# Patient Record
Sex: FEMALE | Race: WHITE | NOT HISPANIC OR LATINO | Employment: OTHER | URBAN - METROPOLITAN AREA
[De-identification: names, ages, dates, MRNs, and addresses within clinical notes are randomized per-mention and may not be internally consistent; named-entity substitution may affect disease eponyms.]

---

## 2022-11-12 ENCOUNTER — PATIENT MESSAGE (OUTPATIENT)
Dept: ORTHOPEDICS | Facility: CLINIC | Age: 62
End: 2022-11-12
Payer: OTHER GOVERNMENT

## 2022-11-17 ENCOUNTER — HOSPITAL ENCOUNTER (OUTPATIENT)
Dept: RADIOLOGY | Facility: HOSPITAL | Age: 62
Discharge: HOME | End: 2022-11-17
Attending: ORTHOPAEDIC SURGERY
Payer: OTHER GOVERNMENT

## 2022-11-17 ENCOUNTER — OFFICE VISIT (OUTPATIENT)
Dept: ORTHOPEDICS | Facility: CLINIC | Age: 62
End: 2022-11-17
Payer: OTHER GOVERNMENT

## 2022-11-17 VITALS — WEIGHT: 136.44 LBS

## 2022-11-17 DIAGNOSIS — M47.812 CERVICAL SPONDYLOSIS: ICD-10-CM

## 2022-11-17 DIAGNOSIS — Z98.1 S/P CERVICAL SPINAL FUSION: Primary | ICD-10-CM

## 2022-11-17 DIAGNOSIS — Z98.1 S/P CERVICAL SPINAL FUSION: ICD-10-CM

## 2022-11-17 DIAGNOSIS — M50.30 DDD (DEGENERATIVE DISC DISEASE), CERVICAL: ICD-10-CM

## 2022-11-17 PROCEDURE — 72125 CT NECK SPINE W/O DYE: CPT | Mod: 26,,, | Performed by: RADIOLOGY

## 2022-11-17 PROCEDURE — 72125 CT CERVICAL SPINE WITHOUT CONTRAST: ICD-10-PCS | Mod: 26,,, | Performed by: RADIOLOGY

## 2022-11-17 PROCEDURE — 99204 OFFICE O/P NEW MOD 45 MIN: CPT | Mod: S$PBB,,, | Performed by: ORTHOPAEDIC SURGERY

## 2022-11-17 PROCEDURE — 72125 CT NECK SPINE W/O DYE: CPT | Mod: TC

## 2022-11-17 PROCEDURE — 99999 PR PBB SHADOW E&M-EST. PATIENT-LVL III: CPT | Mod: PBBFAC,,, | Performed by: ORTHOPAEDIC SURGERY

## 2022-11-17 PROCEDURE — 72050 X-RAY EXAM NECK SPINE 4/5VWS: CPT | Mod: 26,,, | Performed by: RADIOLOGY

## 2022-11-17 PROCEDURE — 72050 XR CERVICAL SPINE AP LAT WITH FLEX EXTEN: ICD-10-PCS | Mod: 26,,, | Performed by: RADIOLOGY

## 2022-11-17 PROCEDURE — 99213 OFFICE O/P EST LOW 20 MIN: CPT | Mod: PBBFAC,25 | Performed by: ORTHOPAEDIC SURGERY

## 2022-11-17 PROCEDURE — 72050 X-RAY EXAM NECK SPINE 4/5VWS: CPT | Mod: TC

## 2022-11-17 PROCEDURE — 99204 PR OFFICE/OUTPT VISIT, NEW, LEVL IV, 45-59 MIN: ICD-10-PCS | Mod: S$PBB,,, | Performed by: ORTHOPAEDIC SURGERY

## 2022-11-17 PROCEDURE — 99999 PR PBB SHADOW E&M-EST. PATIENT-LVL III: ICD-10-PCS | Mod: PBBFAC,,, | Performed by: ORTHOPAEDIC SURGERY

## 2022-11-17 RX ORDER — ATORVASTATIN CALCIUM 20 MG/1
20 TABLET, FILM COATED ORAL DAILY
COMMUNITY

## 2022-11-17 RX ORDER — LORAZEPAM 1 MG/1
1 TABLET ORAL 3 TIMES DAILY
COMMUNITY

## 2022-11-17 RX ORDER — FLUTICASONE PROPIONATE 50 MCG
2 SPRAY, SUSPENSION (ML) NASAL DAILY
COMMUNITY

## 2022-11-17 RX ORDER — DULOXETIN HYDROCHLORIDE 20 MG/1
60 CAPSULE, DELAYED RELEASE ORAL 2 TIMES DAILY
COMMUNITY

## 2022-11-17 RX ORDER — HYDROGEN PEROXIDE 3 %
20 SOLUTION, NON-ORAL MISCELLANEOUS DAILY
COMMUNITY

## 2022-11-17 RX ORDER — METFORMIN HYDROCHLORIDE 1000 MG/1
1000 TABLET ORAL 2 TIMES DAILY WITH MEALS
COMMUNITY

## 2022-11-17 RX ORDER — OXCARBAZEPINE 150 MG/1
150 TABLET, FILM COATED ORAL 2 TIMES DAILY
COMMUNITY

## 2022-11-17 RX ORDER — TRAZODONE HYDROCHLORIDE 100 MG/1
100 TABLET ORAL NIGHTLY
COMMUNITY

## 2022-11-17 RX ORDER — TIZANIDINE 4 MG/1
4 TABLET ORAL 2 TIMES DAILY
Status: ON HOLD | COMMUNITY
End: 2023-02-10 | Stop reason: HOSPADM

## 2022-11-17 RX ORDER — FEXOFENADINE HCL AND PSEUDOEPHEDRINE HCI 60; 120 MG/1; MG/1
1 TABLET, EXTENDED RELEASE ORAL NIGHTLY
COMMUNITY
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
## 2022-11-23 ENCOUNTER — TELEPHONE (OUTPATIENT)
Dept: ORTHOPEDICS | Facility: CLINIC | Age: 62
End: 2022-11-23
Payer: OTHER GOVERNMENT

## 2022-11-23 NOTE — TELEPHONE ENCOUNTER
----- Message from Sharifa Delacruz PA-C sent at 11/21/2022  2:45 PM CST -----  She is booked for surgery with dr horn on 1/10/2023, can you ander her and schedule a pre op appt when you get a second

## 2022-11-29 ENCOUNTER — PATIENT MESSAGE (OUTPATIENT)
Dept: ORTHOPEDICS | Facility: CLINIC | Age: 62
End: 2022-11-29
Payer: OTHER GOVERNMENT

## 2022-11-30 ENCOUNTER — TELEPHONE (OUTPATIENT)
Dept: ORTHOPEDICS | Facility: CLINIC | Age: 62
End: 2022-11-30
Payer: OTHER GOVERNMENT

## 2022-11-30 NOTE — TELEPHONE ENCOUNTER
----- Message from Shanice Farnsworth sent at 11/30/2022 12:13 PM CST -----  Contact: pt  Pt returning call to office     Confirmed patient's contact info below:  Contact Name: Ana Lilia Matos  Phone Number: [phone]

## 2022-11-30 NOTE — TELEPHONE ENCOUNTER
Rescheduled appointment per HAILEY Delacruz for pre-op due to surgery date. Left message for patient informing her of this matter.

## 2023-01-03 ENCOUNTER — PATIENT MESSAGE (OUTPATIENT)
Dept: ADMINISTRATIVE | Facility: OTHER | Age: 63
End: 2023-01-03
Payer: OTHER GOVERNMENT

## 2023-01-09 ENCOUNTER — PATIENT MESSAGE (OUTPATIENT)
Dept: ORTHOPEDICS | Facility: CLINIC | Age: 63
End: 2023-01-09
Payer: OTHER GOVERNMENT

## 2023-01-18 ENCOUNTER — TELEPHONE (OUTPATIENT)
Dept: PREADMISSION TESTING | Facility: HOSPITAL | Age: 63
End: 2023-01-18
Payer: OTHER GOVERNMENT

## 2023-01-18 DIAGNOSIS — Z01.818 PREOPERATIVE TESTING: Primary | ICD-10-CM
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
## 2023-01-18 NOTE — PRE-PROCEDURE INSTRUCTIONS
Patient stated gets LALITO with every surgery. Will need medical clearance from your PCP, Dr. Sunday Lovett in [place], [place]. She will make an appt. Will need poc appt, labs, ua, and ekg.  Our  will call to set up these appts.    Preop instructions given. Hold aspirin, aspirin containing products, nsaids(aleve, advil, motrin, ibuprofen, naprosyn, naproxen, voltaren, diclofenac), vitamins and supplements one week prior to surgery.       ( Sent to [facility] portal)  Verbalizes understanding.

## 2023-01-26 ENCOUNTER — OFFICE VISIT (OUTPATIENT)
Dept: ORTHOPEDICS | Facility: CLINIC | Age: 63
End: 2023-01-26
Payer: OTHER GOVERNMENT

## 2023-01-26 VITALS — WEIGHT: 135.38 LBS | BODY MASS INDEX: 22.56 KG/M2 | HEIGHT: 65 IN

## 2023-01-26 DIAGNOSIS — M50.30 DDD (DEGENERATIVE DISC DISEASE), CERVICAL: ICD-10-CM

## 2023-01-26 DIAGNOSIS — M54.12 CERVICAL RADICULOPATHY: ICD-10-CM

## 2023-01-26 DIAGNOSIS — M47.812 CERVICAL SPONDYLOSIS: Primary | ICD-10-CM

## 2023-01-26 DIAGNOSIS — S12.9XXA PSEUDOARTHROSIS OF CERVICAL SPINE, INITIAL ENCOUNTER: ICD-10-CM

## 2023-01-26 PROCEDURE — 99214 PR OFFICE/OUTPT VISIT, EST, LEVL IV, 30-39 MIN: ICD-10-PCS | Mod: S$PBB,,, | Performed by: ORTHOPAEDIC SURGERY

## 2023-01-26 PROCEDURE — 99999 PR PBB SHADOW E&M-EST. PATIENT-LVL III: CPT | Mod: PBBFAC,,, | Performed by: ORTHOPAEDIC SURGERY

## 2023-01-26 PROCEDURE — 99214 OFFICE O/P EST MOD 30 MIN: CPT | Mod: S$PBB,,, | Performed by: ORTHOPAEDIC SURGERY

## 2023-01-26 PROCEDURE — 99213 OFFICE O/P EST LOW 20 MIN: CPT | Mod: PBBFAC | Performed by: ORTHOPAEDIC SURGERY

## 2023-01-26 PROCEDURE — 99999 PR PBB SHADOW E&M-EST. PATIENT-LVL III: ICD-10-PCS | Mod: PBBFAC,,, | Performed by: ORTHOPAEDIC SURGERY

## 2023-01-26 NOTE — PROGRESS NOTES
DATE: 1/26/2023  PATIENT: Ana Lilia Matos    Attending Physician: Marlon Cruz M.D.    CHIEF COMPLAINT: neck and BUE pain    HISTORY:  Ana Lilia Matos is a 62 y.o. female w/ a hx of C4-7 ACDF 13 years ago presents for initial evaluation of neck and bilateral arm pain (Neck - 7, Arm - 7). The pain has been present for 5 years. The patient describes the pain as dull and it radiates down BUE to fingers. The pain is worse with activity and improved by rest. There is BUE associated numbness and tingling. There is BUE subjective weakness. Prior treatments have included meds (zanaflex, ibuprofen), PT (did not help), injections (facet blocks, etc.), but no recent surgery.     The Patient endorses myelopathic symptoms such as handwriting changes or difficulty with buttons/coins/keys. Denies perineal paresthesias, bowel/bladder dysfunction.    The patient does not smoke or endorse IVDU. She has DM (HA1C of 5.8). The patient is not on any blood thinners and does not take chronic narcotics. She is disabled due to her neck. She was accompanied by her 2 daughters, and they drove 5 hours from [place].    PAST MEDICAL/SURGICAL HISTORY:  Past Medical History:   Diagnosis Date    Diabetes mellitus, type 2      Past Surgical History:   Procedure Laterality Date    APPENDECTOMY      CHOLECYSTECTOMY      HYSTERECTOMY      SPINAL FUSION         Current Medications:   Current Outpatient Medications:     atorvastatin (LIPITOR) 20 MG tablet, Take 20 mg by mouth once daily., Disp: , Rfl:     DULoxetine (CYMBALTA) 20 MG capsule, Take 60 mg by mouth 2 (two) times daily. SAID SHE TAKES 120 MG  IN THE AM, Disp: , Rfl:     esomeprazole (NEXIUM) 20 MG capsule, Take 20 mg by mouth once daily. TAKES 2  (20 MG) FOR A TOTAL OF 40 MG IN THE AM., Disp: , Rfl:     fexofenadine-pseudoephedrine  mg (ALLEGRA-D)  mg per tablet, Take 1 tablet by mouth nightly., Disp: , Rfl:     fluticasone propionate (FLONASE) 50 mcg/actuation nasal spray,  "2 sprays by Each Nostril route once daily., Disp: , Rfl:     LORazepam (ATIVAN) 1 MG tablet, Take 1 mg by mouth 3 (three) times daily., Disp: , Rfl:     metFORMIN (GLUCOPHAGE) 1000 MG tablet, Take 1,000 mg by mouth 2 (two) times daily with meals., Disp: , Rfl:     OXcarbazepine (TRILEPTAL) 150 MG Tab, Take 150 mg by mouth 2 (two) times daily., Disp: , Rfl:     tiZANidine (ZANAFLEX) 4 MG tablet, Take 4 mg by mouth 2 (two) times daily., Disp: , Rfl:     traZODone (DESYREL) 100 MG tablet, Take 100 mg by mouth nightly., Disp: , Rfl:     Social History:   Social History     Socioeconomic History    Marital status:    Tobacco Use    Smoking status: Every Day     Types: Vaping with nicotine    Smokeless tobacco: Never       REVIEW OF SYSTEMS:  Constitution: Negative. Negative for chills, fever and night sweats.   Cardiovascular: Negative for chest pain and syncope.   Respiratory: Negative for cough and shortness of breath.   Gastrointestinal: See HPI. Negative for nausea/vomiting. Negative for abdominal pain.  Genitourinary: See HPI. Negative for discoloration or dysuria.  Skin: Negative for dry skin, itching and rash.   Hematologic/Lymphatic: negative for bleeding/clotting disorders.   Musculoskeletal: Negative for falls and muscle weakness.   Neurological: See HPI. no history of seizures. no history of cranial surgery or shunts.  Endocrine: Negative for polydipsia, polyphagia and polyuria.   Allergic/Immunologic: Negative for hives and persistent infections.  Psychiatric/Behavioral: Negative for depression and insomnia.         EXAM:  Ht 5' 4.5" (1.638 m)   Wt 61.4 kg (135 lb 5.8 oz)   BMI 22.88 kg/m²     General: The patient is a 62 y.o. female in no apparent distress, the patient is orientatied to person, place and time.  Psych: Normal mood and affect  HEENT: Vision grossly intact, hearing intact to the spoken word.  Lungs: Respirations unlabored.  Gait: Normal station and gait, no difficulty with toe or heel " walk.   Skin: Cervical skin negative for rashes, lesions, hairy patches and surgical scars.  Range of motion: Cervical range of motion is acceptable. There is posterior cervical tenderness to palpation.  Spinal Balance: Global saggital and coronal spinal balance acceptable, no significant for scoliosis and kyphosis.  Musculoskeletal: No pain with the range of motion of the bilateral shoulders and elbows. Normal bulk and contour of the bilateral hands.  Vascular: Bilateral hands warm and well perfused, Radial pulses 2+ bilaterally.  Neurological: Normal strength and tone in all major motor groups in the bilateral upper and lower extremities. Normal sensation to light touch in the C5-T1 and L2-S1 dermatomes bilaterally.  Deep tendon reflexes symmetric 2+ in the bilateral upper and lower extremities.  + L Neal's    IMAGING:   Today I independently reviewed the following images and my interpretations are as follows:    AP, Lat and Flex/Ex  upright C-spine films demonstrate C4-7 ACDF with loose screws at C7. Cervical spondylosis and DDD.    Cervical CT showed C4-5 fusion but nonunion C5-7.      MRI cervical ([facility] on a disc) showed left C3-4 and C5-6 moderate foraminal stenosis. No significant central stenosis.    Body mass index is 22.88 kg/m².  No results found for: HGBA1C    ASSESSMENT/PLAN:  Cervical spondylosis    DDD (degenerative disc disease), cervical    Cervical radiculopathy    Pseudoarthrosis of cervical spine, initial encounter      No follow-ups on file.    Patient has cervical stenosis and pseudoarthrosis in the setting of previous C4-7 ACDF. I discussed the natural history of their diagnoses as well as surgical and nonsurgical treatment options. I educated the patient on the importance of core/back strengthening, correct posture, bending/lifting ergonomics, and low-impact aerobic exercises (walking, elliptical, and aquatherapy). Continue medications and exercises. Patient has failed conservative  management and is a candidate for C3-T1 PCDF (C4-6 laminectomy).     I had a sit down discussion with the patient and daughters regarding the above surgery. We specifically discussed the risks, benefits, and alternatives to surgery. We discussed the surgical procedure including the skin incision, nerve decompression, bone fusion, allograft and/or iliac crest bone graft, and surgical implants including lateral mass screws and pedicle screws as indicated: they understand the risks include but are not limited to death, paralysis, blindness, bleeding, infection, damage to arteries, veins and nerves, vertebral artery injury, dysphagia, spinal fluid leak, continued or worsening pain, no improvement in symptoms, non-union, and the possible need for more surgery in the future, as well as the possibility other unforseen and unknown complications. We talked about expected hospital stay and recovery period. All questions were answered; they understand and wish to proceed.    Marlon Cruz MD  Orthopaedic Spine Surgeon  Department of Orthopaedic Surgery  [phone]

## 2023-01-26 NOTE — H&P (VIEW-ONLY)
DATE: 1/26/2023  PATIENT: Ana Lilia Matos    Attending Physician: Marlon Cruz M.D.    CHIEF COMPLAINT: neck and BUE pain    HISTORY:  Ana Lilia Matos is a 62 y.o. female w/ a hx of C4-7 ACDF 13 years ago presents for initial evaluation of neck and bilateral arm pain (Neck - 7, Arm - 7). The pain has been present for 5 years. The patient describes the pain as dull and it radiates down BUE to fingers. The pain is worse with activity and improved by rest. There is BUE associated numbness and tingling. There is BUE subjective weakness. Prior treatments have included meds (zanaflex, ibuprofen), PT (did not help), injections (facet blocks, etc.), but no recent surgery.     The Patient endorses myelopathic symptoms such as handwriting changes or difficulty with buttons/coins/keys. Denies perineal paresthesias, bowel/bladder dysfunction.    The patient does not smoke or endorse IVDU. She has DM (HA1C of 5.8). The patient is not on any blood thinners and does not take chronic narcotics. She is disabled due to her neck. She was accompanied by her 2 daughters, and they drove 5 hours from [place].    PAST MEDICAL/SURGICAL HISTORY:  Past Medical History:   Diagnosis Date    Diabetes mellitus, type 2      Past Surgical History:   Procedure Laterality Date    APPENDECTOMY      CHOLECYSTECTOMY      HYSTERECTOMY      SPINAL FUSION         Current Medications:   Current Outpatient Medications:     atorvastatin (LIPITOR) 20 MG tablet, Take 20 mg by mouth once daily., Disp: , Rfl:     DULoxetine (CYMBALTA) 20 MG capsule, Take 60 mg by mouth 2 (two) times daily. SAID SHE TAKES 120 MG  IN THE AM, Disp: , Rfl:     esomeprazole (NEXIUM) 20 MG capsule, Take 20 mg by mouth once daily. TAKES 2  (20 MG) FOR A TOTAL OF 40 MG IN THE AM., Disp: , Rfl:     fexofenadine-pseudoephedrine  mg (ALLEGRA-D)  mg per tablet, Take 1 tablet by mouth nightly., Disp: , Rfl:     fluticasone propionate (FLONASE) 50 mcg/actuation nasal spray,  "2 sprays by Each Nostril route once daily., Disp: , Rfl:     LORazepam (ATIVAN) 1 MG tablet, Take 1 mg by mouth 3 (three) times daily., Disp: , Rfl:     metFORMIN (GLUCOPHAGE) 1000 MG tablet, Take 1,000 mg by mouth 2 (two) times daily with meals., Disp: , Rfl:     OXcarbazepine (TRILEPTAL) 150 MG Tab, Take 150 mg by mouth 2 (two) times daily., Disp: , Rfl:     tiZANidine (ZANAFLEX) 4 MG tablet, Take 4 mg by mouth 2 (two) times daily., Disp: , Rfl:     traZODone (DESYREL) 100 MG tablet, Take 100 mg by mouth nightly., Disp: , Rfl:     Social History:   Social History     Socioeconomic History    Marital status:    Tobacco Use    Smoking status: Every Day     Types: Vaping with nicotine    Smokeless tobacco: Never       REVIEW OF SYSTEMS:  Constitution: Negative. Negative for chills, fever and night sweats.   Cardiovascular: Negative for chest pain and syncope.   Respiratory: Negative for cough and shortness of breath.   Gastrointestinal: See HPI. Negative for nausea/vomiting. Negative for abdominal pain.  Genitourinary: See HPI. Negative for discoloration or dysuria.  Skin: Negative for dry skin, itching and rash.   Hematologic/Lymphatic: negative for bleeding/clotting disorders.   Musculoskeletal: Negative for falls and muscle weakness.   Neurological: See HPI. no history of seizures. no history of cranial surgery or shunts.  Endocrine: Negative for polydipsia, polyphagia and polyuria.   Allergic/Immunologic: Negative for hives and persistent infections.  Psychiatric/Behavioral: Negative for depression and insomnia.         EXAM:  Ht 5' 4.5" (1.638 m)   Wt 61.4 kg (135 lb 5.8 oz)   BMI 22.88 kg/m²     General: The patient is a 62 y.o. female in no apparent distress, the patient is orientatied to person, place and time.  Psych: Normal mood and affect  HEENT: Vision grossly intact, hearing intact to the spoken word.  Lungs: Respirations unlabored.  Gait: Normal station and gait, no difficulty with toe or heel " walk.   Skin: Cervical skin negative for rashes, lesions, hairy patches and surgical scars.  Range of motion: Cervical range of motion is acceptable. There is posterior cervical tenderness to palpation.  Spinal Balance: Global saggital and coronal spinal balance acceptable, no significant for scoliosis and kyphosis.  Musculoskeletal: No pain with the range of motion of the bilateral shoulders and elbows. Normal bulk and contour of the bilateral hands.  Vascular: Bilateral hands warm and well perfused, Radial pulses 2+ bilaterally.  Neurological: Normal strength and tone in all major motor groups in the bilateral upper and lower extremities. Normal sensation to light touch in the C5-T1 and L2-S1 dermatomes bilaterally.  Deep tendon reflexes symmetric 2+ in the bilateral upper and lower extremities.  + L Neal's    IMAGING:   Today I independently reviewed the following images and my interpretations are as follows:    AP, Lat and Flex/Ex  upright C-spine films demonstrate C4-7 ACDF with loose screws at C7. Cervical spondylosis and DDD.    Cervical CT showed C4-5 fusion but nonunion C5-7.      MRI cervical (OSH on a disc) showed left C3-4 and C5-6 moderate foraminal stenosis. No significant central stenosis.    Body mass index is 22.88 kg/m².  No results found for: HGBA1C    ASSESSMENT/PLAN:  Cervical spondylosis    DDD (degenerative disc disease), cervical    Cervical radiculopathy    Pseudoarthrosis of cervical spine, initial encounter      No follow-ups on file.    Patient has cervical stenosis and pseudoarthrosis in the setting of previous C4-7 ACDF. I discussed the natural history of their diagnoses as well as surgical and nonsurgical treatment options. I educated the patient on the importance of core/back strengthening, correct posture, bending/lifting ergonomics, and low-impact aerobic exercises (walking, elliptical, and aquatherapy). Continue medications and exercises. Patient has failed conservative  management and is a candidate for C3-T1 PCDF (C4-6 laminectomy).     I had a sit down discussion with the patient and daughters regarding the above surgery. We specifically discussed the risks, benefits, and alternatives to surgery. We discussed the surgical procedure including the skin incision, nerve decompression, bone fusion, allograft and/or iliac crest bone graft, and surgical implants including lateral mass screws and pedicle screws as indicated: they understand the risks include but are not limited to death, paralysis, blindness, bleeding, infection, damage to arteries, veins and nerves, vertebral artery injury, dysphagia, spinal fluid leak, continued or worsening pain, no improvement in symptoms, non-union, and the possible need for more surgery in the future, as well as the possibility other unforseen and unknown complications. We talked about expected hospital stay and recovery period. All questions were answered; they understand and wish to proceed.    Marlon Cruz MD  Orthopaedic Spine Surgeon  Department of Orthopaedic Surgery  [phone]

## 2023-01-27 ENCOUNTER — HOSPITAL ENCOUNTER (OUTPATIENT)
Dept: PREADMISSION TESTING | Facility: HOSPITAL | Age: 63
Discharge: HOME | End: 2023-01-27
Attending: ORTHOPAEDIC SURGERY | Admitting: ORTHOPAEDIC SURGERY
Payer: OTHER GOVERNMENT

## 2023-01-27 ENCOUNTER — HOSPITAL ENCOUNTER (OUTPATIENT)
Dept: CARDIOLOGY | Facility: CLINIC | Age: 63
Discharge: HOME | End: 2023-01-27
Payer: OTHER GOVERNMENT

## 2023-01-27 VITALS
BODY MASS INDEX: 22.71 KG/M2 | DIASTOLIC BLOOD PRESSURE: 80 MMHG | TEMPERATURE: 98 F | WEIGHT: 133 LBS | HEART RATE: 91 BPM | HEIGHT: 64 IN | OXYGEN SATURATION: 99 % | SYSTOLIC BLOOD PRESSURE: 123 MMHG

## 2023-01-27 DIAGNOSIS — Z01.818 PREOPERATIVE TESTING: ICD-10-CM

## 2023-01-27 PROCEDURE — 93010 EKG 12-LEAD: ICD-10-PCS | Mod: S$PBB,,, | Performed by: INTERNAL MEDICINE

## 2023-01-27 PROCEDURE — 93010 ELECTROCARDIOGRAM REPORT: CPT | Mod: S$PBB,,, | Performed by: INTERNAL MEDICINE

## 2023-01-27 PROCEDURE — 93005 ELECTROCARDIOGRAM TRACING: CPT | Mod: PBBFAC | Performed by: INTERNAL MEDICINE

## 2023-01-27 NOTE — DISCHARGE INSTRUCTIONS
Your surgery has been scheduled for:______2/10/23____________________________________    You should report to:  ____[facility], located on the [place] side of the first floor of the           [facility] ([phone])  __X__[facility], located on the [facility] side of the            Second floor of the [facility] ([phone])  ____3rd Floor [facility] located on the [facility] side of the [facility] [phone]  ____[facility]: located at [address] [facility] [place] [address]. [address]     Please Note   Tell your doctor if you take Aspirin, products containing Aspirin, herbal medications  or blood thinners, such as Coumadin, Ticlid, or Plavix.  (Consult your provider regarding holding or stopping before surgery).  Arrange for someone to drive you home following surgery.  You will not be allowed to leave the surgical facility alone or drive yourself home following sedation and anesthesia.    Before Surgery  Stop taking all herbal medications, vitamins, and supplements 7 days prior to surgery  No Motrin/Advil (Ibuprofen) 7 days before surgery  No Aleve (Naproxen) 7 days before surgery  Stop Taking Asprin, products containing Asprin __7___days before surgery  Stop taking blood thinners__NA_____days before surgery  No Goody's/BC  Powder 7 days before surgery  Refrain from drinking alcoholic beverages for 24hours before and after surgery  Stop or limit smoking ____7_____days before surgery  You may take Tylenol for pain    Night before Surgery  Do not eat or drink after midnight  Take a shower or bath (shower is recommended).  Bathe with Hibiclens soap or an antibacterial soap from the neck down.  If not supplied by your surgeon, hibiclens soap will need to be purchased over the counter in pharmacy.  Rinse soap off thoroughly.  Shampoo your hair with your regular shampoo    The Day of  Surgery  Take another bath or shower with hibiclens or any antibacterial soap, to reduce the chance of infection.  Take heart and blood pressure medications with a small sip of water, as advised by the perioperative team.  Do not take fluid pills  You may brush your teeth and rinse your mouth, but do not swall any additional water.   Do not apply perfumes, powder, body lotions or deodorant on the day of surgery.  Nail polish should be removed.  Do not wear makeup or moisturizer  Wear comfortable clothes, such as a button front shirt and loose fitting pants.  Leave all jewelry, including body piercings, and valuables at home.    Bring any devices you will neeed after surgery such as crutches or canes.  If you have sleep apnea, please bring your CPAP machine  In the event that your physical condition changes including the onset of a cold or respiratory illness, or if you have to delay or cancel your surgery, please notify your surgeon.       Anesthesia: General Anesthesia     You are watched continuously during your procedure by your anesthesia provider.     Youre due to have surgery. During surgery, youll be given medicine called anesthesia or anesthetic. This will keep you comfortable and pain-free. Your anesthesia provider will use general anesthesia.  What is general anesthesia?  General anesthesia puts you into a state like deep sleep. It goes into the bloodstream (IV anesthetics), into the lungs (gas anesthetics), or both. You feel nothing during the procedure. You will not remember it. During the procedure, the anesthesia provider monitors you continuously. He or she checks your heart rate and rhythm, blood pressure, breathing, and blood oxygen.  IV anesthetics. IV anesthetics are given through an IV line in your arm. Theyre often given first. This is so you are asleep before a gas anesthetic is started. Some kinds of IV anesthetics relieve pain. Others relax you. Your doctor will decide which kind is best  in your case.  Gas anesthetics. Gas anesthetics are breathed into the lungs. They are often used to keep you asleep. They can be given through a facemask or a tube placed in your larynx or trachea (breathing tube).  If you have a facemask, your anesthesia provider will most likely place it over your nose and mouth while youre still awake. Youll breathe oxygen through the mask as your IV anesthetic is started. Gas anesthetic may be added through the mask.  If you have a tube in the larynx or trachea, it will be inserted into your throat after youre asleep.  Anesthesia tools and medicines  You will likely have:  IV anesthetics. These are put into an IV line into your bloodstream.  Gas anesthetics. You breathe these anesthetics into your lungs, where they pass into your bloodstream.  Pulse oximeter. This is a small clip that is attached to the end of your finger. This measures your blood oxygen level.  Electrocardiography leads (electrodes). These are small sticky pads that are placed on your chest. They record your heart rate and rhythm.  Blood pressure cuff. This reads your blood pressure.  Risks and possible complications  General anesthesia has some risks. These include:  Breathing problems  Nausea and vomiting  Sore throat or hoarseness (usually temporary)  Allergic reaction to the anesthetic  Irregular heartbeat (rare)  Cardiac arrest (rare)   Anesthesia safety  Follow all instructions you are given for how long not to eat or drink before your procedure.  Be sure your doctor knows what medicines and drugs you take. This includes over-the-counter medicines, herbs, supplements, alcohol or other drugs. You will be asked when those were last taken.  Have an adult family member or friend drive you home after the procedure.  For the first 24 hours after your surgery:  Do not drive or use heavy equipment.  Do not make important decisions or sign legal documents. If important decisions or signing legal documents is  necessary during the first 24 hours after surgery, have a trusted family member or spouse act on your behalf.  Avoid alcohol.  Have a responsible adult stay with you. He or she can watch for problems and help keep you safe.  Date Last Reviewed: 12/1/2016 © 2000-2017 [employer]. [address] [place] [address], [place] [address]. All rights reserved. This information is not intended as a substitute for professional medical care. Always follow your healthcare professional's instructions.

## 2023-01-29 ENCOUNTER — PATIENT MESSAGE (OUTPATIENT)
Dept: SURGERY | Facility: HOSPITAL | Age: 63
End: 2023-01-29
Payer: OTHER GOVERNMENT

## 2023-01-30 RX ORDER — NITROFURANTOIN 25; 75 MG/1; MG/1
100 CAPSULE ORAL 2 TIMES DAILY
Qty: 10 CAPSULE | Refills: 0 | Status: SHIPPED | OUTPATIENT
Start: 2023-01-30 | End: 2023-02-04

## 2023-02-08 ENCOUNTER — ANESTHESIA EVENT (OUTPATIENT)
Dept: SURGERY | Facility: HOSPITAL | Age: 63
DRG: 454 | End: 2023-02-08
Payer: OTHER GOVERNMENT

## 2023-02-09 ENCOUNTER — TELEPHONE (OUTPATIENT)
Dept: ORTHOPEDICS | Facility: CLINIC | Age: 63
End: 2023-02-09
Payer: OTHER GOVERNMENT

## 2023-02-09 NOTE — TELEPHONE ENCOUNTER
Spoke with patient and informed her of surgery date and time and she agreed verbally. Informed patient of area to park and where to go that morning to sign in (Day of Surgery). No eating or drinking after 12 am. And wash with [employer]. Also wash hair night before surgery. Patient agreed verbally.

## 2023-02-10 ENCOUNTER — ANESTHESIA (OUTPATIENT)
Dept: SURGERY | Facility: HOSPITAL | Age: 63
DRG: 454 | End: 2023-02-10
Payer: OTHER GOVERNMENT

## 2023-02-10 ENCOUNTER — HOSPITAL ENCOUNTER (INPATIENT)
Facility: HOSPITAL | Age: 63
LOS: 2 days | Discharge: HOME HEALTH | DRG: 454 | End: 2023-02-12
Attending: ORTHOPAEDIC SURGERY | Admitting: ORTHOPAEDIC SURGERY
Payer: OTHER GOVERNMENT

## 2023-02-10 DIAGNOSIS — G95.9 CERVICAL MYELOPATHY: ICD-10-CM

## 2023-02-10 LAB
ABO + RH BLD: NORMAL
BLD GP AB SCN CELLS X3 SERPL QL: NORMAL
GLUCOSE SERPL-MCNC: 105 MG/DL (ref 70–110)
HCO3 UR-SCNC: 23.5 MMOL/L (ref 24–28)
HCT VFR BLD CALC: 30 %PCV (ref 36–54)
PCO2 BLDA: 37.7 MMHG (ref 35–45)
PH SMN: 7.4 [PH] (ref 7.35–7.45)
PO2 BLDA: 250 MMHG (ref 80–100)
POC BE: -1 MMOL/L
POC IONIZED CALCIUM: 1.12 MMOL/L (ref 1.06–1.42)
POC SATURATED O2: 100 % (ref 95–100)
POC TCO2: 25 MMOL/L (ref 23–27)
POCT GLUCOSE: 113 MG/DL (ref 70–110)
POCT GLUCOSE: 203 MG/DL (ref 70–110)
POCT GLUCOSE: 93 MG/DL (ref 70–110)
POTASSIUM BLD-SCNC: 3.6 MMOL/L (ref 3.5–5.1)
SAMPLE: ABNORMAL
SODIUM BLD-SCNC: 139 MMOL/L (ref 136–145)

## 2023-02-10 PROCEDURE — 63600175 PHARM REV CODE 636 W HCPCS: Performed by: NURSE ANESTHETIST, CERTIFIED REGISTERED

## 2023-02-10 PROCEDURE — 22614 PR ARTHRODESIS, POST/POSTLAT, SNGL INTERSPACE, EA ADDTL: ICD-10-PCS | Mod: ,,, | Performed by: ORTHOPAEDIC SURGERY

## 2023-02-10 PROCEDURE — 20936 PR AUTOGRAFT SPINE SURGERY LOCAL FROM SAME INCISION: ICD-10-PCS | Mod: ,,, | Performed by: ORTHOPAEDIC SURGERY

## 2023-02-10 PROCEDURE — 11000001 HC ACUTE MED/SURG PRIVATE ROOM

## 2023-02-10 PROCEDURE — 20930 PR ALLOGRAFT FOR SPINE SURGERY ONLY MORSELIZED: ICD-10-PCS | Mod: ,,, | Performed by: ORTHOPAEDIC SURGERY

## 2023-02-10 PROCEDURE — 25000003 PHARM REV CODE 250: Performed by: STUDENT IN AN ORGANIZED HEALTH CARE EDUCATION/TRAINING PROGRAM

## 2023-02-10 PROCEDURE — 27800903 OPTIME MED/SURG SUP & DEVICES OTHER IMPLANTS: Performed by: ORTHOPAEDIC SURGERY

## 2023-02-10 PROCEDURE — 22600 ARTHRD PST TQ 1NTRSPC CRV: CPT | Mod: ,,, | Performed by: ORTHOPAEDIC SURGERY

## 2023-02-10 PROCEDURE — C1776 JOINT DEVICE (IMPLANTABLE): HCPCS | Performed by: ORTHOPAEDIC SURGERY

## 2023-02-10 PROCEDURE — 27000221 HC OXYGEN, UP TO 24 HOURS

## 2023-02-10 PROCEDURE — 94799 UNLISTED PULMONARY SVC/PX: CPT

## 2023-02-10 PROCEDURE — 22600 PR ARTHRODESIS, POST/POSTLAT, SNGL INTERSPACE, CERVICAL BELOW C2: ICD-10-PCS | Mod: ,,, | Performed by: ORTHOPAEDIC SURGERY

## 2023-02-10 PROCEDURE — D9220A PRA ANESTHESIA: Mod: ANES,,, | Performed by: ANESTHESIOLOGY

## 2023-02-10 PROCEDURE — 94761 N-INVAS EAR/PLS OXIMETRY MLT: CPT

## 2023-02-10 PROCEDURE — C1713 ANCHOR/SCREW BN/BN,TIS/BN: HCPCS | Performed by: ORTHOPAEDIC SURGERY

## 2023-02-10 PROCEDURE — 36620 INSERTION CATHETER ARTERY: CPT | Mod: 59,,, | Performed by: ANESTHESIOLOGY

## 2023-02-10 PROCEDURE — 20930 SP BONE ALGRFT MORSEL ADD-ON: CPT | Mod: ,,, | Performed by: ORTHOPAEDIC SURGERY

## 2023-02-10 PROCEDURE — D9220A PRA ANESTHESIA: ICD-10-PCS | Mod: ANES,,, | Performed by: ANESTHESIOLOGY

## 2023-02-10 PROCEDURE — A6011 COLLAGEN GEL/PASTE WOUND FIL: HCPCS | Performed by: ORTHOPAEDIC SURGERY

## 2023-02-10 PROCEDURE — D9220A PRA ANESTHESIA: ICD-10-PCS | Mod: CRNA,,, | Performed by: REGISTERED NURSE

## 2023-02-10 PROCEDURE — 27201037 HC PRESSURE MONITORING SET UP

## 2023-02-10 PROCEDURE — 82962 GLUCOSE BLOOD TEST: CPT | Performed by: ORTHOPAEDIC SURGERY

## 2023-02-10 PROCEDURE — 36000710: Performed by: ORTHOPAEDIC SURGERY

## 2023-02-10 PROCEDURE — 25000003 PHARM REV CODE 250: Performed by: ANESTHESIOLOGY

## 2023-02-10 PROCEDURE — 22842 INSERT SPINE FIXATION DEVICE: CPT | Mod: ,,, | Performed by: ORTHOPAEDIC SURGERY

## 2023-02-10 PROCEDURE — 37000009 HC ANESTHESIA EA ADD 15 MINS: Performed by: ORTHOPAEDIC SURGERY

## 2023-02-10 PROCEDURE — D9220A PRA ANESTHESIA: Mod: CRNA,,, | Performed by: REGISTERED NURSE

## 2023-02-10 PROCEDURE — C1729 CATH, DRAINAGE: HCPCS | Performed by: ORTHOPAEDIC SURGERY

## 2023-02-10 PROCEDURE — 63600175 PHARM REV CODE 636 W HCPCS: Performed by: STUDENT IN AN ORGANIZED HEALTH CARE EDUCATION/TRAINING PROGRAM

## 2023-02-10 PROCEDURE — 71000015 HC POSTOP RECOV 1ST HR: Performed by: ORTHOPAEDIC SURGERY

## 2023-02-10 PROCEDURE — 71000016 HC POSTOP RECOV ADDL HR: Performed by: ORTHOPAEDIC SURGERY

## 2023-02-10 PROCEDURE — 63001 REMOVE SPINE LAMINA 1/2 CRVL: CPT | Mod: 51,,, | Performed by: ORTHOPAEDIC SURGERY

## 2023-02-10 PROCEDURE — 71000033 HC RECOVERY, INTIAL HOUR: Performed by: ORTHOPAEDIC SURGERY

## 2023-02-10 PROCEDURE — 20936 SP BONE AGRFT LOCAL ADD-ON: CPT | Mod: ,,, | Performed by: ORTHOPAEDIC SURGERY

## 2023-02-10 PROCEDURE — 36000711: Performed by: ORTHOPAEDIC SURGERY

## 2023-02-10 PROCEDURE — 99900035 HC TECH TIME PER 15 MIN (STAT)

## 2023-02-10 PROCEDURE — 27201423 OPTIME MED/SURG SUP & DEVICES STERILE SUPPLY: Performed by: ORTHOPAEDIC SURGERY

## 2023-02-10 PROCEDURE — 63600175 PHARM REV CODE 636 W HCPCS: Performed by: REGISTERED NURSE

## 2023-02-10 PROCEDURE — 22614 ARTHRD PST TQ 1NTRSPC EA ADD: CPT | Mod: ,,, | Performed by: ORTHOPAEDIC SURGERY

## 2023-02-10 PROCEDURE — 36620 PR INSERT CATH,ART,PERCUT,SHORTTERM: ICD-10-PCS | Mod: 59,,, | Performed by: ANESTHESIOLOGY

## 2023-02-10 PROCEDURE — 86900 BLOOD TYPING SEROLOGIC ABO: CPT | Performed by: ANESTHESIOLOGY

## 2023-02-10 PROCEDURE — 25000003 PHARM REV CODE 250: Performed by: REGISTERED NURSE

## 2023-02-10 PROCEDURE — 63001: ICD-10-PCS | Mod: 51,,, | Performed by: ORTHOPAEDIC SURGERY

## 2023-02-10 PROCEDURE — 22842 PR POSTERIOR SEGMENTAL INSTRUMENTATION 3-6 VRT SEG: ICD-10-PCS | Mod: ,,, | Performed by: ORTHOPAEDIC SURGERY

## 2023-02-10 PROCEDURE — 63600175 PHARM REV CODE 636 W HCPCS: Performed by: ORTHOPAEDIC SURGERY

## 2023-02-10 PROCEDURE — 25000003 PHARM REV CODE 250: Performed by: ORTHOPAEDIC SURGERY

## 2023-02-10 PROCEDURE — 63600175 PHARM REV CODE 636 W HCPCS: Performed by: ANESTHESIOLOGY

## 2023-02-10 PROCEDURE — 37000008 HC ANESTHESIA 1ST 15 MINUTES: Performed by: ORTHOPAEDIC SURGERY

## 2023-02-10 DEVICE — SET SYMPHONY SCREW NS: Type: IMPLANTABLE DEVICE | Site: NECK | Status: FUNCTIONAL

## 2023-02-10 DEVICE — GRAFT ALTAPORE LARGE CYL 8ML: Type: IMPLANTABLE DEVICE | Site: NECK | Status: FUNCTIONAL

## 2023-02-10 DEVICE — SCREW SYMPHONY PA 3.5X12MM: Type: IMPLANTABLE DEVICE | Site: NECK | Status: FUNCTIONAL

## 2023-02-10 RX ORDER — ONDANSETRON 2 MG/ML
4 INJECTION INTRAMUSCULAR; INTRAVENOUS EVERY 8 HOURS PRN
Status: DISCONTINUED | OUTPATIENT
Start: 2023-02-10 | End: 2023-02-12 | Stop reason: HOSPADM

## 2023-02-10 RX ORDER — GLUCAGON 1 MG
1 KIT INJECTION
Status: DISCONTINUED | OUTPATIENT
Start: 2023-02-10 | End: 2023-02-12 | Stop reason: HOSPADM

## 2023-02-10 RX ORDER — METHOCARBAMOL 750 MG/1
750 TABLET, FILM COATED ORAL 3 TIMES DAILY
Qty: 42 TABLET | Refills: 0 | Status: SHIPPED | OUTPATIENT
Start: 2023-02-10 | End: 2023-02-26

## 2023-02-10 RX ORDER — LIDOCAINE HYDROCHLORIDE 20 MG/ML
INJECTION INTRAVENOUS
Status: DISCONTINUED | OUTPATIENT
Start: 2023-02-10 | End: 2023-02-10

## 2023-02-10 RX ORDER — HALOPERIDOL 5 MG/ML
0.5 INJECTION INTRAMUSCULAR EVERY 10 MIN PRN
Status: DISCONTINUED | OUTPATIENT
Start: 2023-02-10 | End: 2023-02-12 | Stop reason: HOSPADM

## 2023-02-10 RX ORDER — CELECOXIB 200 MG/1
200 CAPSULE ORAL DAILY
Qty: 14 CAPSULE | Refills: 0 | Status: SHIPPED | OUTPATIENT
Start: 2023-02-10 | End: 2023-02-20 | Stop reason: SDUPTHER

## 2023-02-10 RX ORDER — TRAZODONE HYDROCHLORIDE 50 MG/1
100 TABLET ORAL NIGHTLY
Status: DISCONTINUED | OUTPATIENT
Start: 2023-02-10 | End: 2023-02-12 | Stop reason: HOSPADM

## 2023-02-10 RX ORDER — CEFAZOLIN SODIUM 1 G/3ML
INJECTION, POWDER, FOR SOLUTION INTRAMUSCULAR; INTRAVENOUS
Status: DISCONTINUED | OUTPATIENT
Start: 2023-02-10 | End: 2023-02-10

## 2023-02-10 RX ORDER — INSULIN ASPART 100 [IU]/ML
0-5 INJECTION, SOLUTION INTRAVENOUS; SUBCUTANEOUS
Status: DISCONTINUED | OUTPATIENT
Start: 2023-02-10 | End: 2023-02-12 | Stop reason: HOSPADM

## 2023-02-10 RX ORDER — METHOCARBAMOL 750 MG/1
750 TABLET, FILM COATED ORAL 3 TIMES DAILY
Status: DISCONTINUED | OUTPATIENT
Start: 2023-02-10 | End: 2023-02-11

## 2023-02-10 RX ORDER — ACETAMINOPHEN 500 MG
1000 TABLET ORAL EVERY 8 HOURS
Qty: 84 TABLET | Refills: 0 | Status: SHIPPED | OUTPATIENT
Start: 2023-02-10 | End: 2023-02-26

## 2023-02-10 RX ORDER — MIDAZOLAM HYDROCHLORIDE 1 MG/ML
INJECTION, SOLUTION INTRAMUSCULAR; INTRAVENOUS
Status: DISCONTINUED | OUTPATIENT
Start: 2023-02-10 | End: 2023-02-10

## 2023-02-10 RX ORDER — POLYETHYLENE GLYCOL 3350 17 G/17G
17 POWDER, FOR SOLUTION ORAL DAILY
Status: DISCONTINUED | OUTPATIENT
Start: 2023-02-10 | End: 2023-02-12 | Stop reason: HOSPADM

## 2023-02-10 RX ORDER — ATORVASTATIN CALCIUM 20 MG/1
20 TABLET, FILM COATED ORAL DAILY
Status: DISCONTINUED | OUTPATIENT
Start: 2023-02-10 | End: 2023-02-12 | Stop reason: HOSPADM

## 2023-02-10 RX ORDER — FENTANYL CITRATE 50 UG/ML
25 INJECTION, SOLUTION INTRAMUSCULAR; INTRAVENOUS EVERY 5 MIN PRN
Status: DISCONTINUED | OUTPATIENT
Start: 2023-02-10 | End: 2023-02-12 | Stop reason: HOSPADM

## 2023-02-10 RX ORDER — LIDOCAINE HYDROCHLORIDE AND EPINEPHRINE 10; 10 MG/ML; UG/ML
INJECTION, SOLUTION INFILTRATION; PERINEURAL
Status: DISCONTINUED | OUTPATIENT
Start: 2023-02-10 | End: 2023-02-10 | Stop reason: HOSPADM

## 2023-02-10 RX ORDER — ROCURONIUM BROMIDE 10 MG/ML
INJECTION, SOLUTION INTRAVENOUS
Status: DISCONTINUED | OUTPATIENT
Start: 2023-02-10 | End: 2023-02-10

## 2023-02-10 RX ORDER — PHENYLEPHRINE HYDROCHLORIDE 10 MG/ML
INJECTION INTRAVENOUS
Status: DISCONTINUED | OUTPATIENT
Start: 2023-02-10 | End: 2023-02-10

## 2023-02-10 RX ORDER — FENTANYL CITRATE 50 UG/ML
INJECTION, SOLUTION INTRAMUSCULAR; INTRAVENOUS
Status: DISCONTINUED | OUTPATIENT
Start: 2023-02-10 | End: 2023-02-10

## 2023-02-10 RX ORDER — HYDROMORPHONE HYDROCHLORIDE 1 MG/ML
0.2 INJECTION, SOLUTION INTRAMUSCULAR; INTRAVENOUS; SUBCUTANEOUS EVERY 5 MIN PRN
Status: DISCONTINUED | OUTPATIENT
Start: 2023-02-10 | End: 2023-02-12 | Stop reason: HOSPADM

## 2023-02-10 RX ORDER — DEXAMETHASONE SODIUM PHOSPHATE 4 MG/ML
INJECTION, SOLUTION INTRA-ARTICULAR; INTRALESIONAL; INTRAMUSCULAR; INTRAVENOUS; SOFT TISSUE
Status: DISCONTINUED | OUTPATIENT
Start: 2023-02-10 | End: 2023-02-10

## 2023-02-10 RX ORDER — LIDOCAINE HYDROCHLORIDE 10 MG/ML
1 INJECTION, SOLUTION EPIDURAL; INFILTRATION; INTRACAUDAL; PERINEURAL ONCE
Status: DISCONTINUED | OUTPATIENT
Start: 2023-02-10 | End: 2023-02-10

## 2023-02-10 RX ORDER — PROPOFOL 10 MG/ML
VIAL (ML) INTRAVENOUS
Status: DISCONTINUED | OUTPATIENT
Start: 2023-02-10 | End: 2023-02-10

## 2023-02-10 RX ORDER — PHENYLEPHRINE HYDROCHLORIDE 10 MG/ML
INJECTION INTRAVENOUS CONTINUOUS PRN
Status: DISCONTINUED | OUTPATIENT
Start: 2023-02-10 | End: 2023-02-10

## 2023-02-10 RX ORDER — KETAMINE HCL IN 0.9 % NACL 50 MG/5 ML
SYRINGE (ML) INTRAVENOUS
Status: DISCONTINUED | OUTPATIENT
Start: 2023-02-10 | End: 2023-02-10

## 2023-02-10 RX ORDER — HYDROMORPHONE HYDROCHLORIDE 1 MG/ML
0.5 INJECTION, SOLUTION INTRAMUSCULAR; INTRAVENOUS; SUBCUTANEOUS
Status: DISCONTINUED | OUTPATIENT
Start: 2023-02-10 | End: 2023-02-12 | Stop reason: HOSPADM

## 2023-02-10 RX ORDER — ACETAMINOPHEN 325 MG/1
650 TABLET ORAL EVERY 8 HOURS
Status: DISCONTINUED | OUTPATIENT
Start: 2023-02-10 | End: 2023-02-12 | Stop reason: HOSPADM

## 2023-02-10 RX ORDER — ONDANSETRON 2 MG/ML
4 INJECTION INTRAMUSCULAR; INTRAVENOUS DAILY PRN
Status: DISCONTINUED | OUTPATIENT
Start: 2023-02-10 | End: 2023-02-12 | Stop reason: HOSPADM

## 2023-02-10 RX ORDER — DULOXETIN HYDROCHLORIDE 60 MG/1
120 CAPSULE, DELAYED RELEASE ORAL DAILY
Status: DISCONTINUED | OUTPATIENT
Start: 2023-02-11 | End: 2023-02-12 | Stop reason: HOSPADM

## 2023-02-10 RX ORDER — OXCARBAZEPINE 150 MG/1
150 TABLET, FILM COATED ORAL 2 TIMES DAILY
Status: DISCONTINUED | OUTPATIENT
Start: 2023-02-10 | End: 2023-02-12 | Stop reason: HOSPADM

## 2023-02-10 RX ORDER — OXYCODONE HYDROCHLORIDE 10 MG/1
10 TABLET ORAL EVERY 4 HOURS PRN
Status: DISCONTINUED | OUTPATIENT
Start: 2023-02-10 | End: 2023-02-12 | Stop reason: HOSPADM

## 2023-02-10 RX ORDER — ACETAMINOPHEN 500 MG
1000 TABLET ORAL
Status: COMPLETED | OUTPATIENT
Start: 2023-02-10 | End: 2023-02-10

## 2023-02-10 RX ORDER — LORAZEPAM 1 MG/1
1 TABLET ORAL 3 TIMES DAILY
Status: DISCONTINUED | OUTPATIENT
Start: 2023-02-10 | End: 2023-02-12 | Stop reason: HOSPADM

## 2023-02-10 RX ORDER — ONDANSETRON 2 MG/ML
4 INJECTION INTRAMUSCULAR; INTRAVENOUS ONCE AS NEEDED
Status: COMPLETED | OUTPATIENT
Start: 2023-02-10 | End: 2023-02-10

## 2023-02-10 RX ORDER — PROPOFOL 10 MG/ML
VIAL (ML) INTRAVENOUS CONTINUOUS PRN
Status: DISCONTINUED | OUTPATIENT
Start: 2023-02-10 | End: 2023-02-10

## 2023-02-10 RX ORDER — IBUPROFEN 200 MG
16 TABLET ORAL
Status: DISCONTINUED | OUTPATIENT
Start: 2023-02-10 | End: 2023-02-12 | Stop reason: HOSPADM

## 2023-02-10 RX ORDER — MUPIROCIN 20 MG/G
OINTMENT TOPICAL
Status: DISCONTINUED | OUTPATIENT
Start: 2023-02-10 | End: 2023-02-10

## 2023-02-10 RX ORDER — ONDANSETRON 2 MG/ML
INJECTION INTRAMUSCULAR; INTRAVENOUS
Status: DISCONTINUED | OUTPATIENT
Start: 2023-02-10 | End: 2023-02-10

## 2023-02-10 RX ORDER — OXYCODONE HYDROCHLORIDE 5 MG/1
5 TABLET ORAL EVERY 4 HOURS PRN
Status: DISCONTINUED | OUTPATIENT
Start: 2023-02-10 | End: 2023-02-12 | Stop reason: HOSPADM

## 2023-02-10 RX ORDER — SUCCINYLCHOLINE CHLORIDE 20 MG/ML
INJECTION INTRAMUSCULAR; INTRAVENOUS
Status: DISCONTINUED | OUTPATIENT
Start: 2023-02-10 | End: 2023-02-10

## 2023-02-10 RX ORDER — CELECOXIB 200 MG/1
200 CAPSULE ORAL DAILY
Status: DISCONTINUED | OUTPATIENT
Start: 2023-02-10 | End: 2023-02-12 | Stop reason: HOSPADM

## 2023-02-10 RX ORDER — TIZANIDINE 4 MG/1
4 TABLET ORAL ONCE AS NEEDED
Status: DISCONTINUED | OUTPATIENT
Start: 2023-02-10 | End: 2023-02-12 | Stop reason: HOSPADM

## 2023-02-10 RX ORDER — IBUPROFEN 200 MG
24 TABLET ORAL
Status: DISCONTINUED | OUTPATIENT
Start: 2023-02-10 | End: 2023-02-12 | Stop reason: HOSPADM

## 2023-02-10 RX ORDER — SCOLOPAMINE TRANSDERMAL SYSTEM 1 MG/1
1 PATCH, EXTENDED RELEASE TRANSDERMAL
Status: DISCONTINUED | OUTPATIENT
Start: 2023-02-10 | End: 2023-02-10

## 2023-02-10 RX ORDER — OXYCODONE HYDROCHLORIDE 5 MG/1
5 TABLET ORAL EVERY 4 HOURS PRN
Qty: 30 TABLET | Refills: 0 | Status: SHIPPED | OUTPATIENT
Start: 2023-02-10

## 2023-02-10 RX ADMIN — ACETAMINOPHEN 1000 MG: 500 TABLET ORAL at 09:02

## 2023-02-10 RX ADMIN — PHENYLEPHRINE HYDROCHLORIDE 150 MCG: 10 INJECTION INTRAVENOUS at 12:02

## 2023-02-10 RX ADMIN — MIDAZOLAM HYDROCHLORIDE 2 MG: 1 INJECTION, SOLUTION INTRAMUSCULAR; INTRAVENOUS at 11:02

## 2023-02-10 RX ADMIN — INSULIN ASPART 1 UNITS: 100 INJECTION, SOLUTION INTRAVENOUS; SUBCUTANEOUS at 10:02

## 2023-02-10 RX ADMIN — HYDROMORPHONE HYDROCHLORIDE 0.2 MG: 1 INJECTION, SOLUTION INTRAMUSCULAR; INTRAVENOUS; SUBCUTANEOUS at 03:02

## 2023-02-10 RX ADMIN — SUCCINYLCHOLINE CHLORIDE 120 MG: 20 INJECTION, SOLUTION INTRAMUSCULAR; INTRAVENOUS at 11:02

## 2023-02-10 RX ADMIN — LIDOCAINE HYDROCHLORIDE 80 MG: 20 INJECTION INTRAVENOUS at 11:02

## 2023-02-10 RX ADMIN — ACETAMINOPHEN 650 MG: 325 TABLET ORAL at 08:02

## 2023-02-10 RX ADMIN — GLYCOPYRROLATE 0.2 MG: 0.2 INJECTION, SOLUTION INTRAMUSCULAR; INTRAVENOUS at 12:02

## 2023-02-10 RX ADMIN — PROPOFOL 40 MG: 10 INJECTION, EMULSION INTRAVENOUS at 01:02

## 2023-02-10 RX ADMIN — Medication 20 MG: at 11:02

## 2023-02-10 RX ADMIN — PROPOFOL 100 MG: 10 INJECTION, EMULSION INTRAVENOUS at 11:02

## 2023-02-10 RX ADMIN — MUPIROCIN: 20 OINTMENT TOPICAL at 09:02

## 2023-02-10 RX ADMIN — TRAZODONE HYDROCHLORIDE 100 MG: 50 TABLET ORAL at 08:02

## 2023-02-10 RX ADMIN — OXYCODONE HYDROCHLORIDE 10 MG: 10 TABLET ORAL at 03:02

## 2023-02-10 RX ADMIN — ONDANSETRON 4 MG: 2 INJECTION INTRAMUSCULAR; INTRAVENOUS at 02:02

## 2023-02-10 RX ADMIN — Medication 10 MG: at 01:02

## 2023-02-10 RX ADMIN — OXCARBAZEPINE 150 MG: 150 TABLET, FILM COATED ORAL at 08:02

## 2023-02-10 RX ADMIN — ACETAMINOPHEN 650 MG: 325 TABLET ORAL at 03:02

## 2023-02-10 RX ADMIN — SUGAMMADEX 200 MG: 100 INJECTION, SOLUTION INTRAVENOUS at 01:02

## 2023-02-10 RX ADMIN — POLYETHYLENE GLYCOL 3350 17 G: 17 POWDER, FOR SOLUTION ORAL at 03:02

## 2023-02-10 RX ADMIN — ROCURONIUM BROMIDE 50 MG: 10 INJECTION INTRAVENOUS at 12:02

## 2023-02-10 RX ADMIN — PHENYLEPHRINE HYDROCHLORIDE 100 MCG: 10 INJECTION INTRAVENOUS at 01:02

## 2023-02-10 RX ADMIN — FENTANYL CITRATE 100 MCG: 50 INJECTION, SOLUTION INTRAMUSCULAR; INTRAVENOUS at 11:02

## 2023-02-10 RX ADMIN — ATORVASTATIN CALCIUM 20 MG: 20 TABLET, FILM COATED ORAL at 03:02

## 2023-02-10 RX ADMIN — LORAZEPAM 1 MG: 1 TABLET ORAL at 08:02

## 2023-02-10 RX ADMIN — PROPOFOL 40 MG: 10 INJECTION, EMULSION INTRAVENOUS at 12:02

## 2023-02-10 RX ADMIN — CEFAZOLIN 2 G: 2 INJECTION, POWDER, FOR SOLUTION INTRAMUSCULAR; INTRAVENOUS at 08:02

## 2023-02-10 RX ADMIN — ONDANSETRON 4 MG: 2 INJECTION INTRAMUSCULAR; INTRAVENOUS at 09:02

## 2023-02-10 RX ADMIN — PHENYLEPHRINE HYDROCHLORIDE 200 MCG: 10 INJECTION INTRAVENOUS at 01:02

## 2023-02-10 RX ADMIN — CELECOXIB 200 MG: 200 CAPSULE ORAL at 03:02

## 2023-02-10 RX ADMIN — CEFAZOLIN 2 G: 330 INJECTION, POWDER, FOR SOLUTION INTRAMUSCULAR; INTRAVENOUS at 12:02

## 2023-02-10 RX ADMIN — PHENYLEPHRINE HYDROCHLORIDE 100 MCG: 10 INJECTION INTRAVENOUS at 02:02

## 2023-02-10 RX ADMIN — PHENYLEPHRINE HYDROCHLORIDE 50 MCG: 10 INJECTION INTRAVENOUS at 01:02

## 2023-02-10 RX ADMIN — PHENYLEPHRINE HYDROCHLORIDE 0.25 MCG/KG/MIN: 10 INJECTION INTRAVENOUS at 11:02

## 2023-02-10 RX ADMIN — SCOPALAMINE 1 PATCH: 1 PATCH, EXTENDED RELEASE TRANSDERMAL at 09:02

## 2023-02-10 RX ADMIN — Medication 200 MCG/KG/MIN: at 11:02

## 2023-02-10 RX ADMIN — METHOCARBAMOL 750 MG: 750 TABLET, FILM COATED ORAL at 03:02

## 2023-02-10 RX ADMIN — SODIUM CHLORIDE: 0.9 INJECTION, SOLUTION INTRAVENOUS at 11:02

## 2023-02-10 RX ADMIN — SUGAMMADEX 100 MG: 100 INJECTION, SOLUTION INTRAVENOUS at 01:02

## 2023-02-10 RX ADMIN — METHOCARBAMOL 750 MG: 750 TABLET, FILM COATED ORAL at 08:02

## 2023-02-10 RX ADMIN — REMIFENTANIL HYDROCHLORIDE 0.2 MCG/KG/MIN: 1 INJECTION, POWDER, LYOPHILIZED, FOR SOLUTION INTRAVENOUS at 11:02

## 2023-02-10 RX ADMIN — DEXAMETHASONE SODIUM PHOSPHATE 8 MG: 4 INJECTION, SOLUTION INTRAMUSCULAR; INTRAVENOUS at 12:02

## 2023-02-10 RX ADMIN — OXYCODONE HYDROCHLORIDE 10 MG: 10 TABLET ORAL at 08:02

## 2023-02-10 NOTE — ANESTHESIA PREPROCEDURE EVALUATION
02/10/2023  Ana Lilia Matos is a 62 y.o., female.  Pre-operative evaluation for Procedure(s) (LRB):  LAMINECTOMY, SPINE, CERVICAL, WITH POSTERIOR FUSION C3-T1 PLDF BARBARA SINHA SNS: MOTORS/SSEP/EMG (N/A)    Ana Lilia Matos is a 62 y.o. female       There is no problem list on file for this patient.      Past Surgical History:   Procedure Laterality Date    APPENDECTOMY      CHOLECYSTECTOMY      HYSTERECTOMY      SPINAL FUSION         Social History     Socioeconomic History    Marital status:    Tobacco Use    Smoking status: Every Day     Types: Vaping with nicotine    Smokeless tobacco: Never       No current facility-administered medications on file prior to encounter.     Current Outpatient Medications on File Prior to Encounter   Medication Sig Dispense Refill    atorvastatin (LIPITOR) 20 MG tablet Take 20 mg by mouth once daily.      DULoxetine (CYMBALTA) 20 MG capsule Take 60 mg by mouth 2 (two) times daily. SAID SHE TAKES 120 MG  IN THE AM      esomeprazole (NEXIUM) 20 MG capsule Take 20 mg by mouth once daily. TAKES 2  (20 MG) FOR A TOTAL OF 40 MG IN THE AM.      fexofenadine-pseudoephedrine  mg (ALLEGRA-D)  mg per tablet Take 1 tablet by mouth nightly.      fluticasone propionate (FLONASE) 50 mcg/actuation nasal spray 2 sprays by Each Nostril route once daily.      LORazepam (ATIVAN) 1 MG tablet Take 1 mg by mouth 3 (three) times daily.      metFORMIN (GLUCOPHAGE) 1000 MG tablet Take 1,000 mg by mouth 2 (two) times daily with meals.      OXcarbazepine (TRILEPTAL) 150 MG Tab Take 150 mg by mouth 2 (two) times daily.      tiZANidine (ZANAFLEX) 4 MG tablet Take 4 mg by mouth 2 (two) times daily.      traZODone (DESYREL) 100 MG tablet Take 100 mg by mouth nightly.         Review of patient's allergies indicates:   Allergen Reactions    Aspirin-acetaminophen  Palpitations         CBC: No results for input(s): WBC, RBC, HGB, HCT, PLT, MCV, MCH, MCHC in the last 72 hours.    CMP: No results for input(s): NA, K, CL, CO2, BUN, CREATININE, GLU, MG, PHOS, CALCIUM, ALBUMIN, PROT, ALKPHOS, ALT, AST, BILITOT in the last 72 hours.    INR  No results for input(s): PT, INR, PROTIME, APTT in the last 72 hours.      Diagnostic Studies:    EKG:   Results for orders placed or performed during the hospital encounter of 01/27/23   EKG 12-lead    Collection Time: 01/27/23 10:16 AM    Narrative    Test Reason : Z01.818,    Vent. Rate : 091 BPM     Atrial Rate : 091 BPM     P-R Int : 152 ms          QRS Dur : 068 ms      QT Int : 376 ms       P-R-T Axes : 074 026 071 degrees     QTc Int : 462 ms    Normal sinus rhythm  Normal ECG  No previous ECGs available  Confirmed by Michael Mueller MD ([id number]) on 1/27/2023 10:53:46 AM    Referred By: IKER MESSINA           Confirmed By:Michael Mueller MD       TTE:  No results found for this or any previous visit.  No results found for: EF   No results found for this or any previous visit.    ALYSSIA:  No results found for this or any previous visit.    Stress Test:  No results found for this or any previous visit.       LHC:  No results found for this or any previous visit.       PFT:  No results found for: FEV1, FVC, LPM9MPP, TLC, DLCO     ALLERGIES:     Review of patient's allergies indicates:   Allergen Reactions    Aspirin-acetaminophen Palpitations     LDA:      Lines/Drains/Airways     None                  Anesthesia Evaluation      Airway   Mallampati: II  TM distance: > 6 cm  Neck ROM: Extension Decreased  Dental    (+) Edentulous and Dentures    Pulmonary    Cardiovascular     Rate: Normal    Neuro/Psych      GI/Hepatic/Renal      Endo/Other    (+) diabetes mellitus,   Abdominal                     Pre-op Assessment    I have reviewed the Patient Summary Reports.    I have reviewed the Nursing Notes. I have reviewed the NPO Status.   I have reviewed  the Medications.     Review of Systems  Anesthesia Hx:  PONV Denies Family Hx of Anesthesia complications.  Personal Hx of Anesthesia complications, Post-Operative Nausea/Vomiting, with every anesthetic, treatment not known   Cardiovascular:  Cardiovascular Normal     Pulmonary:  Pulmonary Normal    Renal/:  Renal/ Normal     Endocrine:   Diabetes        Physical Exam  General: Well nourished    Airway:  Mallampati: II   Mouth Opening: Normal  TM Distance: > 6 cm  Tongue: Normal  Neck ROM: Extension Decreased  Prior ACDF C4-7; minimal neck extension  Dental:  Edentulous, Dentures    Chest/Lungs:  Normal Respiratory Rate    Heart:  Rate: Normal        Anesthesia Plan  Type of Anesthesia, risks & benefits discussed:    Anesthesia Type: Gen ETT  Intra-op Monitoring Plan: Standard ASA Monitors and Art Line  Post Op Pain Control Plan: multimodal analgesia and IV/PO Opioids PRN  Induction:  IV  Airway Plan: Direct, Post-Induction  Informed Consent: Informed consent signed with the Patient and all parties understand the risks and agree with anesthesia plan.  All questions answered. Patient consented to blood products? Yes  ASA Score: 3  Day of Surgery Review of History & Physical: H&P Update referred to the surgeon/provider.    Ready For Surgery From Anesthesia Perspective.     .

## 2023-02-10 NOTE — ANESTHESIA PROCEDURE NOTES
Intubation    Date/Time: 2/10/2023 11:40 AM  Performed by: Christopher Escudero CRNA  Authorized by: Melida Perez MD     Intubation:     Induction:  Rapid sequence induction    Intubated:  Postinduction    Mask Ventilation:  N/a    Attempts:  1    Attempted By:  CRNA    Method of Intubation:  Video laryngoscopy    Blade:  Paz 3    Laryngeal View Grade: Grade I - full view of cords      Difficult Airway Encountered?: No      Complications:  None    Airway Device:  Oral endotracheal tube    Airway Device Size:  7.0    Style/Cuff Inflation:  Cuffed (inflated to minimal occlusive pressure)    Inflation Amount (mL):  6    Secured at:  The lips    Placement Verified By:  Capnometry    Complicating Factors:  Poor neck/head extension    Findings Post-Intubation:  BS equal bilateral and atraumatic/condition of teeth unchanged

## 2023-02-10 NOTE — ANESTHESIA PROCEDURE NOTES
Arterial    Diagnosis: Cervical myleopathy    Patient location during procedure: done in OR    Staffing  Authorizing Provider: Melida Perez MD  Performing Provider: Melida Perez MD    Anesthesiologist was present at the time of the procedure.    Preanesthetic Checklist  Completed: patient identified, IV checked, site marked, risks and benefits discussed, surgical consent, monitors and equipment checked, pre-op evaluation, timeout performed and anesthesia consent givenArterial  Skin Prep: chlorhexidine gluconate  Local Infiltration: none  Orientation: left  Location: radial    Catheter Size: 20 G  Catheter placement by Anatomical landmarks. Heme positive aspiration all ports. Insertion Attempts: 1  Assessment  Dressing: secured with tape and tegaderm  Patient: Tolerated well

## 2023-02-10 NOTE — HPI
Ana Lilia Matos is a 62 y.o. female w/ a hx of C4-7 ACDF 13 years ago presents for initial evaluation of neck and bilateral arm pain (Neck - 7, Arm - 7). The pain has been present for 5 years. The patient describes the pain as dull and it radiates down BUE to fingers. The pain is worse with activity and improved by rest. There is BUE associated numbness and tingling. There is BUE subjective weakness. Prior treatments have included meds (zanaflex, ibuprofen), PT (did not help), injections (facet blocks, etc.), but no recent surgery.      The Patient endorses myelopathic symptoms such as handwriting changes or difficulty with buttons/coins/keys. Denies perineal paresthesias, bowel/bladder dysfunction.     The patient does not smoke or endorse IVDU. She has DM (HA1C of 5.8). The patient is not on any blood thinners and does not take chronic narcotics. She is disabled due to her neck. She was accompanied by her 2 daughters, and they drove 5 hours from [place].

## 2023-02-10 NOTE — HOSPITAL COURSE
the patient arrived to pre-op area for proper pre-operative management.  Upon completion of pre-operative preparation, the patient was taken back to the operative theatre.  A C3-T1 PCDF was performed without complication and the patient was transported to the post anesthesia care unit in stable condition. After appropriate recovery from the anaesthetic agents used during the surgery the patient was transferred to the floor where they received pain medication and physical therapy. When the patient was deemed medically safe for DC, they were discharged to home on a multimodal pain regimen and a 4 week post-op clinic appointment.

## 2023-02-10 NOTE — NURSING TRANSFER
Nursing Transfer Note      2/10/2023     Reason patient is being transferred: to room post recovery    Transfer to 545    Transfer via stretcher    Transfer with O2 @ 1LPM    Transported by [facility]    Medicines sent: None    Any special needs or follow-up needed: Cervical Collar on     Chart send with patient: Yes    Notified: Daughter, Spouse    Patient reassessed at: 02/10/23 @ 1630    Upon arrival to floor: Transfer of care to floor nurseEugenia RN

## 2023-02-10 NOTE — TRANSFER OF CARE
Anesthesia Transfer of Care Note    Patient: Ana Lilia Matos    Procedure(s) Performed: Procedure(s) (LRB):  LAMINECTOMY, SPINE, CERVICAL, WITH POSTERIOR FUSION C3-T1 PLDF BARBARA SINHA SNS: MOTORS/SSEP/EMG (N/A)    Patient location: PACU    Anesthesia Type: general    Transport from OR: Transported from OR on 6-10 L/min O2 by face mask with adequate spontaneous ventilation    Post pain: adequate analgesia    Post assessment: no apparent anesthetic complications    Post vital signs: stable    Level of consciousness: sedated    Nausea/Vomiting: no nausea/vomiting    Complications: none    Transfer of care protocol was followed      Last vitals:   Visit Vitals  BP (!) 154/95 (BP Location: Right arm, Patient Position: Lying)   Pulse 106   Temp 36.1 °C (97 °F) (Temporal)   Resp 13   Wt 59 kg (130 lb)   SpO2 100%   Breastfeeding No   BMI 22.31 kg/m²

## 2023-02-10 NOTE — OP NOTE
DATE OF PROCEDURE: 2/10/23     SURGEON: Marlon Cruz M.D.     ASSISTANT-SURGEON: Leslye Mcdowell, PGY-3 Orthopedics     PREOPERATIVE DIAGNOSIS:     1. 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 Cervical stenosis with radiculopathy     POSTOPERATIVE DIAGNOSIS:    Same      PROCEDURES PERFORMED:  1.  Posterior cervical spinal fusion C3-T1  2.  C4-6 laminectomy for decompression of central and foraminal stenosis  3.  Posterior segmental instrumentation C3-T1  4.  Cadaveric and local bone grafting.     ANESTHESIA: General endotracheal anesthesia.     ESTIMATED BLOOD LOSS:  100 mL.     IMPLANTS: Depuy  3.5x12mm lateral mass screws bilaterally C3-6 except for L C4 (4.0x12mm)  4.5x24 pedicle screws bilaterally T1  3.5mm Ti rods bilaterally  [place] shape     SPECIMENS: None.     FINDINGS:  None.     DRAINS: One deep and one superficial     COMPLICATIONS: None.     SPONGE AND NEEDLE COUNT: Correct x2.     NEUROLOGICAL MONITORING: Motor evoked potentials, somatosensory evoked potential, and free-running EMG.  There were no changes to stable and reliable bilateral upper and lower extremity motor and somatosensory potentials throughout the entire procedure.     DESCRIPTION INFORMED CONSENT: I had a sit down discussion with the patient regarding the planned procedure. We specifically discussed the risks, benefits, and alternatives to surgery. We discussed the surgical procedure including the skin incision, nerve decompression, bone fusion, allograft and/or iliac crest bone graft, and surgical implants including lateral mass screws and pedicle screws as indicated: they understand the risks include but are not limited to death, paralysis, blindness, bleeding, infection, damage to arteries, veins and nerves, vertebral artery injury, dysphagia, spinal fluid leak, continued or worsening pain, no improvement in symptoms, non-union, and the possible need for more surgery in the future, as well as the possibility other unforseen and unknown  complications. We talked about expected hospital stay and recovery period. All questions were answered; they understand and wish to proceed.     REASON FOR OPERATION AND BRIEF HISTORY AND PHYSICAL: The patient is a 62 y.o. female with a history of a C4-7 anterior cervical diskectomy and fusion. She presented to clinic recently with symptoms of consistent with cervical radiculopathy. Radiographic evaluation demonstrates C4-6 stenosis with nonunion.  She is here for surgery today.     DESCRIPTION OF PROCEDURE: The patient was met in the preoperative area where his posterior cervical spine was marked as the operative site. Subsequently, they were brought to the Operating Room where they was placed under general endotracheal anesthesia. Sequential compression devices were placed in the patient's bilateral calves and run throughout the case. A Silva catheter was then sterilely placed. Matthews-Genomed tongs were then placed in the standard sterile fashion and the patient was positioned prone on a Jean table with 4 post pads.  The head was secured to 15 lb of in-line traction. The arms were padded talked. The shoulders were gently taped down. The posterior cervical spine was then prepped and draped in a normal sterile fashion.     A full timeout was then called, identifying the patient, the procedural site and levels, the availability of all instruments and implants, and no specific nursing, surgical, anesthetic, or neurological monitoring concerns. Finally, it was safe to proceed with surgery and the patient was given weight-appropriate dose of Ancef by the Anesthesia Service.     We then made a midline posterior cervical incision and performed a preliminary subperiosteal exposure to the C5-C6 lamina transverse processes and relevant intervening facet joints. At the conclusion of my preliminary exposure we placed an elevator to the left C5-6 facet joint took a lateral radiograph as well as a kocher clamp on C5 spinous  process and I confirmed the levels radiographically. Next we prepared lateral mass screw tracts in the standard fashion from C3-C6 bilaterally but did not place screws to facilitate laminectomy. We skipped C7. Next we inserted T1 pedicle screws in a standard fashion.     We then began the neurological decompression portion of the procedure. A high-speed drill was used to perform a trough style laminectomy from C4-6 bilaterally.  These laminar fragments were then removed on block. The spinal cord and bilateral nerves were adequately decompressed from C3-7. Epidural hemostasis was achieved with a combination of bipolar electrocautery as well as FloSeal and patties. Wound was thoroughly irrigated. Screws were placed in the previously prepared tracts from C3-T1. AP and lateral radiographs then taken demonstrating correct level as well as adequate position of all implants. All bony surfaces were then decorticated from C3-T1. At this point, the weights were switched to the rope with more superior vector force in order to increase cervical lordosis. Rods were then measured, cut, contoured and locked into place with provided set plugs and torque wrench. The patient's local bone graft was milled in a bone mill. It was combined with 10cc of allograft and was laid dorsally along the decorticated surfaces from C3-T1 bilaterally. 1g of vancomycin powder was spread in the wound. One 10-German hemovac drain was placed deep and was left to gravity.     At this point the wound was closed in layers using 0 Ethibond for the fascia. One superficial 10-German HV drain was placed to suction. Would was closed in layers with 2-0 Vicryl for the dermis and 3-0 Monocryl for the skin along with Cellerate.     Cervical collar was placed. The patient was then flipped supine. The Matthews-Wells tongs were removed. She was extubated and transferred to the recovery room in stable condition.    Marlon Cruz MD  Orthopaedic Spine Surgeon  Department of  Orthopaedic Surgery  [phone]

## 2023-02-10 NOTE — PLAN OF CARE
Chart reviewed. Preop nursing care complete per orders at 0932. Safe surgery checklist complete.  at bedside and to take belongings. Waiting for anesthesia consent prior to surgery.

## 2023-02-10 NOTE — DISCHARGE INSTRUCTIONS
DR. DOREEN HORNER'S POSTOPERATIVE INSTRUCTIONS -   POSTERIOR CERVICAL LAMINECTOMY AND FUSION       Antibiotics: You do not need additional antibiotics at home.    NSAIDs: Please refrain from taking ibuprofen (Advil), naproxen (Aleve), and other non steroidal anti-inflammatory medications other than the Celebrex that will be prescribed to you after surgery.    Wound Care: You may remove your dressing and shower 7 days after surgery. Until then please keep your wound clean and dry. Sponge baths are acceptable. Do not go in a pool or hot tub until seen in clinic. Please leave the small steri-strips covering your wound in place until they fall off naturally (2 weeks). You may notice clear suture ends hanging from the sides of your incision after the steri-strips are removed, it is ok to clip these with scissors.    Cervical Collar: If given a collar after surgery you should wear it at all times. The only times it may be removed are for sleeping, eating and showering.    Pain: We will use a multimodal approach for pain management after your surgery.  You will be given a prescription for pain medicine when you are discharged from the hospital.  You will also be given prescriptions for Robaxin (a muscle relaxer), Gabapentin, Celebrex and Tylenol.  Please note: you will only be given ONE prescription for narcotics when you are discharged from the hospital.  This medication is for breakthrough pain only. This medication will not be refilled.  The other medications given to you may be refilled if needed.      Difficulty Breathing: This is uncommon after surgery and may represent dangerous swelling in your neck. If you experience difficulty breathing please call 911 immediately.    Infection: Signs of infection include increasing wound drainage and redness around the wound, as well as a temperature over 101.5 degrees. It is unnecessary to take your temperature on a routine basis. Please call the above number if you are concerned  about an infection.    Driving and Work: It is ok to return to driving and work as long as you are not taking narcotic pain medications or wearing your cervical collar. Please do not lift over 5 pounds or participate in exercise or sports until cleared by Dr. Cruz.    Deep Venous Thrombosis (Blood Clots): Symptoms include swelling in the legs and shortness of breath. Please call the office or proceed to the nearest emergency room if you have any of these symptoms.    Physical Therapy: The best physical therapy immediately after surgery is walking. Please try to walk as much as possible.    Follow-up: You will be scheduled for a follow-up appointment in 4 weeks with either Dr. Cruz or his physician assistant, Sharifa Delacruz PA-C.    Questions: During business hours please call [phone] for routine questions. For after hours questions please call [phone] and ask to speak with the Orthopaedic resident on call.    Disability: If you submitted short term disability paperwork for us to complete and would like to check the status, please call the Disability Department at [phone].  You may fax any necessary paperwork to [phone].

## 2023-02-11 PROBLEM — G95.9 CERVICAL MYELOPATHY: Status: ACTIVE | Noted: 2023-02-11

## 2023-02-11 LAB
ANION GAP SERPL CALC-SCNC: 11 MMOL/L (ref 8–16)
BASOPHILS # BLD AUTO: 0.03 K/UL (ref 0–0.2)
BASOPHILS NFR BLD: 0.3 % (ref 0–1.9)
BUN SERPL-MCNC: 5 MG/DL (ref 8–23)
CALCIUM SERPL-MCNC: 9.6 MG/DL (ref 8.7–10.5)
CHLORIDE SERPL-SCNC: 105 MMOL/L (ref 95–110)
CO2 SERPL-SCNC: 24 MMOL/L (ref 23–29)
CREAT SERPL-MCNC: 0.7 MG/DL (ref 0.5–1.4)
DIFFERENTIAL METHOD: ABNORMAL
EOSINOPHIL # BLD AUTO: 0 K/UL (ref 0–0.5)
EOSINOPHIL NFR BLD: 0 % (ref 0–8)
ERYTHROCYTE [DISTWIDTH] IN BLOOD BY AUTOMATED COUNT: 12.9 % (ref 11.5–14.5)
EST. GFR  (NO RACE VARIABLE): >60 ML/MIN/1.73 M^2
GLUCOSE SERPL-MCNC: 124 MG/DL (ref 70–110)
HCT VFR BLD AUTO: 38.4 % (ref 37–48.5)
HGB BLD-MCNC: 12.7 G/DL (ref 12–16)
IMM GRANULOCYTES # BLD AUTO: 0.03 K/UL (ref 0–0.04)
IMM GRANULOCYTES NFR BLD AUTO: 0.3 % (ref 0–0.5)
LYMPHOCYTES # BLD AUTO: 1.2 K/UL (ref 1–4.8)
LYMPHOCYTES NFR BLD: 12.8 % (ref 18–48)
MCH RBC QN AUTO: 29.7 PG (ref 27–31)
MCHC RBC AUTO-ENTMCNC: 33.1 G/DL (ref 32–36)
MCV RBC AUTO: 90 FL (ref 82–98)
MONOCYTES # BLD AUTO: 0.6 K/UL (ref 0.3–1)
MONOCYTES NFR BLD: 5.8 % (ref 4–15)
NEUTROPHILS # BLD AUTO: 7.7 K/UL (ref 1.8–7.7)
NEUTROPHILS NFR BLD: 80.8 % (ref 38–73)
NRBC BLD-RTO: 0 /100 WBC
PLATELET # BLD AUTO: 222 K/UL (ref 150–450)
PMV BLD AUTO: 9.3 FL (ref 9.2–12.9)
POCT GLUCOSE: 118 MG/DL (ref 70–110)
POCT GLUCOSE: 130 MG/DL (ref 70–110)
POTASSIUM SERPL-SCNC: 4.7 MMOL/L (ref 3.5–5.1)
RBC # BLD AUTO: 4.27 M/UL (ref 4–5.4)
SODIUM SERPL-SCNC: 140 MMOL/L (ref 136–145)
WBC # BLD AUTO: 9.54 K/UL (ref 3.9–12.7)

## 2023-02-11 PROCEDURE — 97116 GAIT TRAINING THERAPY: CPT

## 2023-02-11 PROCEDURE — 85025 COMPLETE CBC W/AUTO DIFF WBC: CPT | Performed by: STUDENT IN AN ORGANIZED HEALTH CARE EDUCATION/TRAINING PROGRAM

## 2023-02-11 PROCEDURE — 97161 PT EVAL LOW COMPLEX 20 MIN: CPT

## 2023-02-11 PROCEDURE — 99900035 HC TECH TIME PER 15 MIN (STAT)

## 2023-02-11 PROCEDURE — 63600175 PHARM REV CODE 636 W HCPCS: Performed by: STUDENT IN AN ORGANIZED HEALTH CARE EDUCATION/TRAINING PROGRAM

## 2023-02-11 PROCEDURE — 94761 N-INVAS EAR/PLS OXIMETRY MLT: CPT

## 2023-02-11 PROCEDURE — 25000003 PHARM REV CODE 250: Performed by: STUDENT IN AN ORGANIZED HEALTH CARE EDUCATION/TRAINING PROGRAM

## 2023-02-11 PROCEDURE — 11000001 HC ACUTE MED/SURG PRIVATE ROOM

## 2023-02-11 PROCEDURE — 36415 COLL VENOUS BLD VENIPUNCTURE: CPT | Performed by: STUDENT IN AN ORGANIZED HEALTH CARE EDUCATION/TRAINING PROGRAM

## 2023-02-11 PROCEDURE — 27000221 HC OXYGEN, UP TO 24 HOURS

## 2023-02-11 PROCEDURE — 97165 OT EVAL LOW COMPLEX 30 MIN: CPT

## 2023-02-11 PROCEDURE — 80048 BASIC METABOLIC PNL TOTAL CA: CPT | Performed by: STUDENT IN AN ORGANIZED HEALTH CARE EDUCATION/TRAINING PROGRAM

## 2023-02-11 PROCEDURE — 94799 UNLISTED PULMONARY SVC/PX: CPT

## 2023-02-11 PROCEDURE — 97530 THERAPEUTIC ACTIVITIES: CPT

## 2023-02-11 RX ORDER — METHOCARBAMOL 500 MG/1
1000 TABLET, FILM COATED ORAL 3 TIMES DAILY
Status: DISCONTINUED | OUTPATIENT
Start: 2023-02-11 | End: 2023-02-12 | Stop reason: HOSPADM

## 2023-02-11 RX ADMIN — CELECOXIB 200 MG: 200 CAPSULE ORAL at 08:02

## 2023-02-11 RX ADMIN — OXCARBAZEPINE 150 MG: 150 TABLET, FILM COATED ORAL at 10:02

## 2023-02-11 RX ADMIN — OXYCODONE HYDROCHLORIDE 10 MG: 10 TABLET ORAL at 03:02

## 2023-02-11 RX ADMIN — DULOXETINE 120 MG: 60 CAPSULE, DELAYED RELEASE ORAL at 08:02

## 2023-02-11 RX ADMIN — ACETAMINOPHEN 650 MG: 325 TABLET ORAL at 02:02

## 2023-02-11 RX ADMIN — CEFAZOLIN 2 G: 2 INJECTION, POWDER, FOR SOLUTION INTRAMUSCULAR; INTRAVENOUS at 04:02

## 2023-02-11 RX ADMIN — TRAZODONE HYDROCHLORIDE 100 MG: 50 TABLET ORAL at 10:02

## 2023-02-11 RX ADMIN — METHOCARBAMOL 1000 MG: 500 TABLET ORAL at 08:02

## 2023-02-11 RX ADMIN — HYDROMORPHONE HYDROCHLORIDE 0.5 MG: 1 INJECTION, SOLUTION INTRAMUSCULAR; INTRAVENOUS; SUBCUTANEOUS at 09:02

## 2023-02-11 RX ADMIN — OXCARBAZEPINE 150 MG: 150 TABLET, FILM COATED ORAL at 08:02

## 2023-02-11 RX ADMIN — ATORVASTATIN CALCIUM 20 MG: 20 TABLET, FILM COATED ORAL at 08:02

## 2023-02-11 RX ADMIN — LORAZEPAM 1 MG: 1 TABLET ORAL at 10:02

## 2023-02-11 RX ADMIN — HYDROMORPHONE HYDROCHLORIDE 0.5 MG: 1 INJECTION, SOLUTION INTRAMUSCULAR; INTRAVENOUS; SUBCUTANEOUS at 03:02

## 2023-02-11 RX ADMIN — OXYCODONE HYDROCHLORIDE 10 MG: 10 TABLET ORAL at 07:02

## 2023-02-11 RX ADMIN — METHOCARBAMOL 1000 MG: 500 TABLET ORAL at 10:02

## 2023-02-11 RX ADMIN — ACETAMINOPHEN 650 MG: 325 TABLET ORAL at 10:02

## 2023-02-11 RX ADMIN — POLYETHYLENE GLYCOL 3350 17 G: 17 POWDER, FOR SOLUTION ORAL at 08:02

## 2023-02-11 RX ADMIN — ACETAMINOPHEN 650 MG: 325 TABLET ORAL at 04:02

## 2023-02-11 RX ADMIN — LORAZEPAM 1 MG: 1 TABLET ORAL at 03:02

## 2023-02-11 RX ADMIN — OXYCODONE HYDROCHLORIDE 10 MG: 10 TABLET ORAL at 08:02

## 2023-02-11 RX ADMIN — METHOCARBAMOL 1000 MG: 500 TABLET ORAL at 03:02

## 2023-02-11 RX ADMIN — LORAZEPAM 1 MG: 1 TABLET ORAL at 08:02

## 2023-02-11 NOTE — ASSESSMENT & PLAN NOTE
Ana Lilia Matos is a 62 y.o. female s/p C3-T1 PCDF on 2/10/23    VS:  Stable   Labs:  Post op Hb 12.7, no further labs   Pain control: multimodal regimen  PT/OT: WBAT with spine precautions   DVT PPx:  Ambulation and mechanical SCDs  Abx:  24h post op ancef   Silva:  Dc after therapy POD1  Drain:  40, 0; continue drain care   F/u: 4 wks spine clinic     Dispo: PT/OT eval today. Continue drain care

## 2023-02-11 NOTE — PROGRESS NOTES
[facility] - [facility]  Orthopedics  Progress Note    Patient Name: Ana Lilia Matos  MRN: [id number]  Admission Date: 2/10/2023  Hospital Length of Stay: 1 days  Attending Provider: Marlon Cruz MD  Primary Care Provider: Primary Doctor No  Follow-up For: Procedure(s) (LRB):  LAMINECTOMY, SPINE, CERVICAL, WITH POSTERIOR FUSION C3-T1 PLDF DEPUY GW TONGS SNS: MOTORS/SSEP/EMG (N/A)    Post-Operative Day: 1 Day Post-Op  Subjective:     Principal Problem:Cervical myelopathy    Principal Orthopedic Problem: s/p C3-T1 PCDF 2/10/23    Interval History: POD1. Reports posterior cervical soreness overnight. Aspen collar in place. Vitals stable. Drain output: 40, 0 cc. PT/OT eval today. Silva out after up with therapy.     Review of patient's allergies indicates:   Allergen Reactions    Aspirin Palpitations       Current Facility-Administered Medications   Medication    acetaminophen tablet 650 mg    atorvastatin tablet 20 mg    celecoxib capsule 200 mg    dextrose 10% bolus 125 mL 125 mL    dextrose 10% bolus 250 mL 250 mL    DULoxetine DR capsule 120 mg    fentaNYL 50 mcg/mL injection 25 mcg    glucagon (human recombinant) injection 1 mg    glucose chewable tablet 16 g    glucose chewable tablet 24 g    haloperidol lactate injection 0.5 mg    HYDROmorphone injection 0.2 mg    HYDROmorphone injection 0.5 mg    insulin aspart U-100 pen 0-5 Units    LORazepam tablet 1 mg    methocarbamoL tablet 1,000 mg    ondansetron injection 4 mg    ondansetron injection 4 mg    OXcarbazepine tablet 150 mg    oxyCODONE immediate release tablet 5 mg    oxyCODONE immediate release tablet Tab 10 mg    polyethylene glycol packet 17 g    tiZANidine tablet 4 mg    traZODone tablet 100 mg     Objective:     Vital Signs (Most Recent):  Temp: 97.2 °F (36.2 °C) (02/11/23 0522)  Pulse: 65 (02/11/23 0522)  Resp: 18 (02/11/23 0522)  BP: 127/71 (02/11/23 0522)  SpO2: 98 % (02/11/23 0522) Vital Signs (24h Range):  Temp:  [97 °F (36.1  °C)-99 °F (37.2 °C)] 97.2 °F (36.2 °C)  Pulse:  [] 65  Resp:  [13-20] 18  SpO2:  [94 %-100 %] 98 %  BP: (119-170)/() 127/71     Weight: 59 kg (130 lb)     Body mass index is 22.31 kg/m².      Intake/Output Summary (Last 24 hours) at 2/11/2023 0704  Last data filed at 2/11/2023 0519  Gross per 24 hour   Intake 1634.93 ml   Output 2890 ml   Net -1255.07 ml       Ortho/SPM Exam  Vitals: Afebrile. Vital signs stable.  General: No acute distress.  Cardio: Regular rate.  Chest: No increased work of breathing.  Incision: c/d/I  Aspen collar in place     Significant Labs: CBC:   Recent Labs   Lab 02/10/23  1326 02/11/23  0536   WBC  --  9.54   HGB  --  12.7   HCT 30* 38.4   PLT  --  222     CMP:   Recent Labs   Lab 02/11/23  0536      K 4.7      CO2 24   *   BUN 5*   CREATININE 0.7   CALCIUM 9.6   ANIONGAP 11     All pertinent labs within the past 24 hours have been reviewed.    Significant Imaging: I have reviewed and interpreted all pertinent imaging results/findings.    Assessment/Plan:     * Cervical myelopathy  Ana Lilia Matos is a 62 y.o. female s/p C3-T1 PCDF on 2/10/23    VS:  Stable   Labs:  Post op Hb 12.7, no further labs   Pain control: multimodal regimen  PT/OT: WBAT with spine precautions   DVT PPx:  Ambulation and mechanical SCDs  Abx:  24h post op ancef   Silva:  Dc after therapy POD1  Drain:  40, 0; continue drain care   F/u: 4 wks spine clinic     Dispo: PT/OT eval today. Continue drain care               Leslye Mcdowell MD  Orthopedics  [facility] - Surgery

## 2023-02-11 NOTE — PT/OT/SLP EVAL
"Occupational Therapy   Co - Evaluation with PT    Co-evaluation/treatment performed due to patient's multiple deficits requiring two skilled therapists to appropriately and safely assess patient's strength and endurance while facilitating functional tasks in addition to accommodating for patient's activity tolerance.         Name: Ana Lilia Matos  MRN: [id number]  Admitting Diagnosis: Cervical myelopathy  Recent Surgery: Procedure(s) (LRB):  LAMINECTOMY, SPINE, CERVICAL, WITH POSTERIOR FUSION C3-T1 PLDF DEPUY OREN SINHA SNS: MOTORS/SSEP/EMG (N/A) 1 Day Post-Op    Recommendations:     Discharge Recommendations: home health OT  Discharge Equipment Recommendations:   TBD as pt progresses  Barriers to discharge:   increased assistance needed    Assessment:     Ana Lilia Matos is a 62 y.o. female with a medical diagnosis of Cervical myelopathy.  She presents with performance deficits affecting function: weakness, impaired balance, impaired functional mobility, gait instability, decreased lower extremity function.  Pt is unsafe with functional mobility at this time due to requiring moderate assistance for bed mobility, transfers, and mobility due to dizziness, pain, and instability. Patient is motivated to progress, and pt's significant other is motivated to assist. Pt encountered with accordion drain detached from line and PT reattached, RN and tech summoned to look at drain site and change bed linens.     Rehab Prognosis: Good; patient would benefit from acute skilled OT services to address these deficits and reach maximum level of function.       Plan:     Patient to be seen 4 x/week to address the above listed problems via self-care/home management, therapeutic activities, therapeutic exercises  Plan of Care Expires: 02/25/23  Plan of Care Reviewed with: patient    Subjective     Chief Complaint: "I feel unsteady" "The right side of my neck hurts"   Patient/Family Comments/goals: Get better, return home "     Occupational Profile:  Living Environment: Pt lives with significant other in [facility] with 4 steps to entrance and BHR, WIS with built in seat, low toilet but with sink next to toilet   Previous level of function: independent, though used rollator for functional mobility occasionally.   Roles and Routines: Pt is looking forward to returning to watching her grandchildren at their ball games, and being with their pet dogs  Equipment Used at Home: grab bar, shower chair, rollator, wheelchair  Assistance upon Discharge: family     Pain/Comfort:  Pain Rating 1: 8/10  Location - Orientation 1: posterior  Location 1: neck  Pain Addressed 1: Reposition, Cessation of Activity, Nurse notified  Pain Rating Post-Intervention 1: 9/10 (after mobility, nurse present at end of treatment to give pt pain medicine)    Patients cultural, spiritual, L[employer] day conflicts given the current situation: no    Objective:     Communicated with: YOLETTE Bello prior to session.  Patient found HOB elevated with peripheral IV, telemetry, cervical collar upon OT entry to room.    General Precautions: Standard, fall  Orthopedic Precautions: spinal precautions  Braces: Aspen collar (when OOB)  Respiratory Status: Room air    Occupational Performance:    Bed Mobility:    Patient completed Rolling/Turning to Left with  minimum assistance  Patient completed Rolling/Turning to Right with minimum assistance  Patient completed Scooting/Bridging with moderate assistance  Patient completed Supine to Sit with moderate assistance      Functional Mobility/Transfers:  Patient completed Sit <> Stand Transfer with moderate assistance  with  hand-held assist   Patient completed Bed <> Chair Transfer using stand pivot technique with moderate assistance with hand-held assist  Functional Mobility: Pt engaging in functional mobility to simulate household/community distances with mod A and utilizing HHA due to c/o dizziness and instability in order to maximize functional  activity tolerance and standing balance required for engagement in occupations of choice.     Activities of Daily Living:  Upper Body Dressing: total assistance affixing gown ties at neck and back to maximize coverage    Cognitive/Visual Perceptual:  Cognitive/Psychosocial Skills:     -       Oriented to: AOx4   -       Follows Commands/attention:Follows multistep  commands  -       Communication: clear/fluent  -       Memory: No Deficits noted  -       Safety awareness/insight to disability: impaired   -       Mood/Affect/Coping skills/emotional control: Appropriate to situation, Labile, and Pleasant  Visual/Perceptual:      -Intact      Physical Exam:  Balance:    -       Impaired, narrow MIRZA  Postural examination/scapula alignment:    -       Rounded shoulders  -       Forward head  Skin integrity: Visible skin intact  Edema:  None noted  Sensation:    -       Impaired  c/o tingling in BUE  Motor Planning:    -       impaired  Dominant hand:    -       right  Upper Extremity Range of Motion:     -       Right Upper Extremity: Deficits: unable to ascertain due to 9/10 pain this date   -       Left Upper Extremity: Deficits: unable to ascertain due to 9/10 pain this date  Upper Extremity Strength:    -       Right Upper Extremity: Deficits: unable to ascertain due to 9/10 pain this date  -       Left Upper Extremity: Deficits: unable to ascertain due to 9/10 pain this date   Strength:    -       Right Upper Extremity: 3/5  -       Left Upper Extremity: 3/5  Fine Motor Coordination:    -       Intact  Gross motor coordination:   WFL  Neurological:    -       intact    AMPAC 6 Click ADL:  AMPAC Total Score: 16    Treatment & Education:  Pt educated on role of OT, POC, and goals for therapy.    POC was dicussed with patient/caregiver, who was included in its development and is in agreement with the identified goals and treatment plan.   Patient and family aware of patient's deficits and therapy progression.   Time  provided for therapeutic counseling and discussion of health disposition.   Educated on importance of EOB/OOB mobility, maintaining routine, sitting up in chair, and maximizing independence with ADLs during admission   Pt completed ADLs and functional mobility for treatment session as noted above   Pt/caregiver verbalized understanding and expressed no further concerns/questions.  Updated communication board with level of assist required       Patient left up in chair with all lines intact, call button in reach, RN notified, and significant other and RN present    GOALS:   Multidisciplinary Problems       Occupational Therapy Goals          Problem: Occupational Therapy    Goal Priority Disciplines Outcome Interventions   Occupational Therapy Goal     OT, PT/OT Ongoing, Progressing    Description: Goals to be met by: 2/25/23     Patient will increase functional independence with ADLs by performing:    UE Dressing with Contact Guard Assistance.  LE Dressing with Minimal Assistance.  Grooming while standing with Minimal Assistance.  Toileting from toilet with Minimal Assistance for hygiene and clothing management.   Step transfer with Minimal Assistance  Toilet transfer to toilet with Minimal Assistance.                         History:     Past Medical History:   Diagnosis Date    Diabetes mellitus, type 2          Past Surgical History:   Procedure Laterality Date    APPENDECTOMY      CHOLECYSTECTOMY      HYSTERECTOMY      SPINAL FUSION         Time Tracking:     OT Date of Treatment: 02/11/23  OT Start Time: 0914  OT Stop Time: 0937  OT Total Time (min): 23 min    Billable Minutes:Evaluation 8  Therapeutic Activity 15    2/11/2023

## 2023-02-11 NOTE — SUBJECTIVE & OBJECTIVE
Principal Problem:Cervical myelopathy    Principal Orthopedic Problem: s/p C3-T1 PCDF 2/10/23    Interval History: POD1. Reports posterior cervical soreness overnight. Aspen collar in place. Vitals stable. Drain output: 40, 0 cc. PT/OT eval today. Silva out after up with therapy.     Review of patient's allergies indicates:   Allergen Reactions    Aspirin Palpitations       Current Facility-Administered Medications   Medication    acetaminophen tablet 650 mg    atorvastatin tablet 20 mg    celecoxib capsule 200 mg    dextrose 10% bolus 125 mL 125 mL    dextrose 10% bolus 250 mL 250 mL    DULoxetine DR capsule 120 mg    fentaNYL 50 mcg/mL injection 25 mcg    glucagon (human recombinant) injection 1 mg    glucose chewable tablet 16 g    glucose chewable tablet 24 g    haloperidol lactate injection 0.5 mg    HYDROmorphone injection 0.2 mg    HYDROmorphone injection 0.5 mg    insulin aspart U-100 pen 0-5 Units    LORazepam tablet 1 mg    methocarbamoL tablet 1,000 mg    ondansetron injection 4 mg    ondansetron injection 4 mg    OXcarbazepine tablet 150 mg    oxyCODONE immediate release tablet 5 mg    oxyCODONE immediate release tablet Tab 10 mg    polyethylene glycol packet 17 g    tiZANidine tablet 4 mg    traZODone tablet 100 mg     Objective:     Vital Signs (Most Recent):  Temp: 97.2 °F (36.2 °C) (02/11/23 0522)  Pulse: 65 (02/11/23 0522)  Resp: 18 (02/11/23 0522)  BP: 127/71 (02/11/23 0522)  SpO2: 98 % (02/11/23 0522) Vital Signs (24h Range):  Temp:  [97 °F (36.1 °C)-99 °F (37.2 °C)] 97.2 °F (36.2 °C)  Pulse:  [] 65  Resp:  [13-20] 18  SpO2:  [94 %-100 %] 98 %  BP: (119-170)/() 127/71     Weight: 59 kg (130 lb)     Body mass index is 22.31 kg/m².      Intake/Output Summary (Last 24 hours) at 2/11/2023 0704  Last data filed at 2/11/2023 0519  Gross per 24 hour   Intake 1634.93 ml   Output 2890 ml   Net -1255.07 ml       Ortho/SPM Exam  Vitals: Afebrile. Vital signs stable.  General: No acute  distress.  Cardio: Regular rate.  Chest: No increased work of breathing.  Incision: c/d/I  Aspen collar in place     Significant Labs: CBC:   Recent Labs   Lab 02/10/23  1326 02/11/23  0536   WBC  --  9.54   HGB  --  12.7   HCT 30* 38.4   PLT  --  222     CMP:   Recent Labs   Lab 02/11/23  0536      K 4.7      CO2 24   *   BUN 5*   CREATININE 0.7   CALCIUM 9.6   ANIONGAP 11     All pertinent labs within the past 24 hours have been reviewed.    Significant Imaging: I have reviewed and interpreted all pertinent imaging results/findings.

## 2023-02-11 NOTE — PT/OT/SLP EVAL
"Physical Therapy Evaluation/co eval with OT    Patient Name:  Ana Lilia Matos   MRN:  [id number]    Recommendations:     Discharge Recommendations: home health PT   Discharge Equipment Recommendations:  (TBD as pt progresses)   Barriers to discharge: Inaccessible home 4 SIMRAN    Assessment:     Ana Lilia Matos is a 62 y.o. female admitted with a medical diagnosis of Cervical myelopathy.  She presents with the following impairments/functional limitations: weakness, impaired balance, impaired functional mobility, gait instability, decreased lower extremity function . Pt is unsafe with functional mobility at this time due to pt requires moderate assist for bed mobility, moderate assist for transfers, and moderate assist for gait due to dizziness, pain, and instability. Pt is motivated to progress with functional mobility.     Rehab Prognosis: Good; patient would benefit from acute skilled PT services to address these deficits and reach maximum level of function.    Recent Surgery: Procedure(s) (LRB):  LAMINECTOMY, SPINE, CERVICAL, WITH POSTERIOR FUSION C3-T1 PLDF DEPUY OREN SINHA SNS: MOTORS/SSEP/EMG (N/A) 1 Day Post-Op    Plan:     During this hospitalization, patient to be seen 4 x/week to address the identified rehab impairments via gait training, therapeutic activities, therapeutic exercises, neuromuscular re-education and progress toward the following goals:    Plan of Care Expires:  03/13/23    Subjective   "I feel unsteady"    Pain/Comfort:  Pain Rating 1: 8/10  Location - Orientation 1: posterior  Location 1: neck  Pain Addressed 1: Reposition, Cessation of Activity, Nurse notified  Pain Rating Post-Intervention 1: 9/10 (after mobility, nnurse present at end of treatment to give pt pain medicine)    Patients cultural, spiritual, [employer]ec[employer]alism conflicts given the current situation: no    Living Environment:  Pt lives with her  in a 1 story h ome with 4 SIMRAN with B UE rails  Prior to admission, patients level " of function was independent; used a rollator for gait at times and at times no AD.  Equipment used at home: grab bar, shower chair, rollator, wheelchair.  Upon discharge, patient will have assistance from .    Objective:     Communicated with nurse prior to session.  Patient found HOB elevated with peripheral IV, telemetry, cervical collar  upon PT entry to room.    General Precautions: Standard, fall  Orthopedic Precautions:spinal precautions   Braces: Aspen collar (when OOB)  Respiratory Status: Nasal cannula, flow 2 L/min    Exams:  Cognitive Exam:  Patient is oriented to Person, Place, and Time  Sensation:    -       Intact  light/touch B LE  RLE ROM: WFL  RLE Strength: WFL except hip flex 4-/5  LLE ROM: WFL  LLE Strength: WFL except hip flex 4-/5    Functional Mobility:  Bed Mobility:     Rolling Left:  minimum assistance  Supine to Sit: moderate assistance  Transfers:     Sit to Stand:  moderate assistance with hand-held assist  Gait: 5 steps then 25 ft with HHA with moderate assist due to pt c/o dizziness and instability. Pt performed gait with narrow MIRZA at times and lateral instability.Gait limited due to instability and dizziness    AM-PAC 6 CLICK MOBILITY  Total Score:13     Treatment & Education:  Pt educated in spinal precautions and safety with mobility, she expressed understanding.   Co-treat with OT due to pt in pain limited multiple sessions.   Patient left up in chair with all lines intact, call button in reach, nurse notified, and  present.    GOALS:   Multidisciplinary Problems       Physical Therapy Goals          Problem: Physical Therapy    Goal Priority Disciplines Outcome Goal Variances Interventions   Physical Therapy Goal     PT, PT/OT Ongoing, Progressing     Description: PT goals until 2/18/23    1. Pt supine to sit with SBA through sidelying-not met  2. Pt sit to supine with SBA through sidelying-not met  3. Pt sit to stand with or without RW as needed for safety with  SBA-not met  4. Pt to perform gait 100ft with or without RW as needed for safety with SBA.-not met  5. Pt to up/down 4 steps with B UE rail with CGA.-not met  6. Pt to perform B LE exs in sitting or supine x 10 reps to strengthen B LE to improve functional mobility.-not met                        History:     Past Medical History:   Diagnosis Date    Diabetes mellitus, type 2        Past Surgical History:   Procedure Laterality Date    APPENDECTOMY      CHOLECYSTECTOMY      HYSTERECTOMY      SPINAL FUSION         Time Tracking:     PT Received On: 02/11/23  PT Start Time: 0911     PT Stop Time: 0938  PT Total Time (min): 27 min     Billable Minutes: Evaluation 17 and Gait Training 10      02/11/2023

## 2023-02-11 NOTE — PLAN OF CARE
Problem: Physical Therapy  Goal: Physical Therapy Goal  Description: PT goals until 2/18/23    1. Pt supine to sit with SBA through sidelying-not met  2. Pt sit to supine with SBA through sidelying-not met  3. Pt sit to stand with or without RW as needed for safety with SBA-not met  4. Pt to perform gait 100ft with or without RW as needed for safety with SBA.-not met  5. Pt to up/down 4 steps with B UE rail with CGA.-not met  6. Pt to perform B LE exs in sitting or supine x 10 reps to strengthen B LE to improve functional mobility.-not met   Outcome: Ongoing, Progressing   Pt's goals set and pt will benefit from skilled PT services to work towards improved functional mobility including: bed mobility, transfers, up/down steps, and gait.   2/11/2023

## 2023-02-11 NOTE — PLAN OF CARE
Pt completed therapy session with good engagement, though was emotional due to pain.     Problem: Occupational Therapy  Goal: Occupational Therapy Goal  Description: Goals to be met by: 2/25/23     Patient will increase functional independence with ADLs by performing:    UE Dressing with Contact Guard Assistance.  LE Dressing with Minimal Assistance.  Grooming while standing with Minimal Assistance.  Toileting from toilet with Minimal Assistance for hygiene and clothing management.   Step transfer with Minimal Assistance  Toilet transfer to toilet with Minimal Assistance.    Outcome: Ongoing, Progressing

## 2023-02-12 VITALS
BODY MASS INDEX: 22.31 KG/M2 | HEART RATE: 92 BPM | WEIGHT: 130 LBS | SYSTOLIC BLOOD PRESSURE: 120 MMHG | OXYGEN SATURATION: 98 % | TEMPERATURE: 98 F | RESPIRATION RATE: 14 BRPM | DIASTOLIC BLOOD PRESSURE: 74 MMHG

## 2023-02-12 LAB — POCT GLUCOSE: 109 MG/DL (ref 70–110)

## 2023-02-12 PROCEDURE — 94761 N-INVAS EAR/PLS OXIMETRY MLT: CPT

## 2023-02-12 PROCEDURE — 99900035 HC TECH TIME PER 15 MIN (STAT)

## 2023-02-12 PROCEDURE — 25000003 PHARM REV CODE 250: Performed by: STUDENT IN AN ORGANIZED HEALTH CARE EDUCATION/TRAINING PROGRAM

## 2023-02-12 PROCEDURE — 63600175 PHARM REV CODE 636 W HCPCS: Performed by: STUDENT IN AN ORGANIZED HEALTH CARE EDUCATION/TRAINING PROGRAM

## 2023-02-12 RX ADMIN — LORAZEPAM 1 MG: 1 TABLET ORAL at 09:02

## 2023-02-12 RX ADMIN — DULOXETINE 120 MG: 60 CAPSULE, DELAYED RELEASE ORAL at 09:02

## 2023-02-12 RX ADMIN — ACETAMINOPHEN 650 MG: 325 TABLET ORAL at 07:02

## 2023-02-12 RX ADMIN — ATORVASTATIN CALCIUM 20 MG: 20 TABLET, FILM COATED ORAL at 09:02

## 2023-02-12 RX ADMIN — HYDROMORPHONE HYDROCHLORIDE 0.5 MG: 1 INJECTION, SOLUTION INTRAMUSCULAR; INTRAVENOUS; SUBCUTANEOUS at 09:02

## 2023-02-12 RX ADMIN — OXCARBAZEPINE 150 MG: 150 TABLET, FILM COATED ORAL at 09:02

## 2023-02-12 RX ADMIN — CELECOXIB 200 MG: 200 CAPSULE ORAL at 09:02

## 2023-02-12 RX ADMIN — METHOCARBAMOL 1000 MG: 500 TABLET ORAL at 09:02

## 2023-02-12 RX ADMIN — OXYCODONE HYDROCHLORIDE 10 MG: 10 TABLET ORAL at 07:02

## 2023-02-12 NOTE — DISCHARGE SUMMARY
[facility]  Orthopedics  Discharge Summary      Patient Name: Ana Lilia Matos  MRN: [id number]  Admission Date: 2/10/2023  Hospital Length of Stay: 2 days  Discharge Date and Time:  02/12/2023 12:03 PM  Attending Physician: Marlon Cruz MD   Discharging Provider: Leslye Mcdowell MD  Primary Care Provider: Primary Doctor No    HPI:   Ana Lilia Matos is a 62 y.o. female w/ a hx of C4-7 ACDF 13 years ago presents for initial evaluation of neck and bilateral arm pain (Neck - 7, Arm - 7). The pain has been present for 5 years. The patient describes the pain as dull and it radiates down BUE to fingers. The pain is worse with activity and improved by rest. There is BUE associated numbness and tingling. There is BUE subjective weakness. Prior treatments have included meds (zanaflex, ibuprofen), PT (did not help), injections (facet blocks, etc.), but no recent surgery.      The Patient endorses myelopathic symptoms such as handwriting changes or difficulty with buttons/coins/keys. Denies perineal paresthesias, bowel/bladder dysfunction.     The patient does not smoke or endorse IVDU. She has DM (HA1C of 5.8). The patient is not on any blood thinners and does not take chronic narcotics. She is disabled due to her neck. She was accompanied by her 2 daughters, and they drove 5 hours from [place].      Procedure(s) (LRB):  LAMINECTOMY, SPINE, CERVICAL, WITH POSTERIOR FUSION C3-T1 PLDF BARBARA SINHA SNS: MOTORS/SSEP/EMG (N/A)      Hospital Course:  the patient arrived to pre-op area for proper pre-operative management.  Upon completion of pre-operative preparation, the patient was taken back to the operative theatre.  A C3-T1 PCDF was performed without complication and the patient was transported to the post anesthesia care unit in stable condition. After appropriate recovery from the anaesthetic agents used during the surgery the patient was transferred to the floor where they received pain medication and physical  therapy. When the patient was deemed medically safe for DC, they were discharged to home on a multimodal pain regimen and a 4 week post-op clinic appointment.        Goals of Care Treatment Preferences:  Code Status: Full Code          Significant Diagnostic Studies: Labs: All labs within the past 24 hours have been reviewed    Pending Diagnostic Studies:     None        Final Active Diagnoses:    Diagnosis Date Noted POA    PRINCIPAL PROBLEM:  Cervical myelopathy [G95.9] 02/11/2023 Yes      Problems Resolved During this Admission:      Discharged Condition: stable    Disposition: [place] [facility]    Medications:  Reconciled Home Medications:      Medication List      START taking these medications    acetaminophen 500 MG tablet  Commonly known as: TYLENOL  Take 2 tablets (1,000 mg total) by mouth every 8 (eight) hours. for 14 days     celecoxib 200 MG capsule  Commonly known as: CeleBREX  Take 1 capsule (200 mg total) by mouth once daily. for 14 days     methocarbamoL 750 MG Tab  Commonly known as: ROBAXIN  Take 1 tablet (750 mg total) by mouth 3 (three) times daily. for 14 days     oxyCODONE 5 MG immediate release tablet  Commonly known as: ROXICODONE  Take 1 tablet (5 mg total) by mouth every 4 (four) hours as needed for Pain.        STOP taking these medications    tiZANidine 4 MG tablet  Commonly known as: ZANAFLEX        ASK your doctor about these medications    atorvastatin 20 MG tablet  Commonly known as: LIPITOR  Take 20 mg by mouth once daily.     DULoxetine 20 MG capsule  Commonly known as: CYMBALTA  Take 60 mg by mouth 2 (two) times daily. SAID SHE TAKES 120 MG  IN THE AM     esomeprazole 20 MG capsule  Commonly known as: NEXIUM  Take 20 mg by mouth once daily. TAKES 2  (20 MG) FOR A TOTAL OF 40 MG IN THE AM.     fexofenadine-pseudoephedrine  mg  mg per tablet  Commonly known as: ALLEGRA-D  Take 1 tablet by mouth nightly.     fluticasone propionate 50 mcg/actuation nasal spray  Commonly  known as: FLONASE  2 sprays by Each Nostril route once daily.     LORazepam 1 MG tablet  Commonly known as: ATIVAN  Take 1 mg by mouth 3 (three) times daily.     metFORMIN 1000 MG tablet  Commonly known as: GLUCOPHAGE  Take 1,000 mg by mouth 2 (two) times daily with meals.     OXcarbazepine 150 MG Tab  Commonly known as: TRILEPTAL  Take 150 mg by mouth 2 (two) times daily.     traZODone 100 MG tablet  Commonly known as: DESYREL  Take 100 mg by mouth nightly.            Leslye Mcdowell MD  Orthopedics  [facility] - Surgery

## 2023-02-12 NOTE — PLAN OF CARE
[facility]  Initial Discharge Assessment       Primary Care Provider: Primary Doctor No    Admission Diagnosis: Cervical spondylosis [M47.812]  Cervical myelopathy [G95.9]    Admission Date: 2/10/2023  Expected Discharge Date: 2/12/2023    Discharge Barriers Identified: None    Payor:  / Plan:  RETIRED [employer]ERVE [employer] / Product Type: Government /     Extended Emergency Contact Information  Primary Emergency Contact: QuirinoGaudencio  Address: [address] [place] [address] [facility]  Home Phone: [phone]  Mobile Phone: [phone]  Relation: Spouse  Preferred language: English   needed? No  Secondary Emergency Contact: Mari León  Address: [address] [place] [address] [place]  Mobile Phone: [phone]  Relation: Daughter    Discharge Plan A: Home Health  Discharge Plan B: Home with family      [facility] [place], [place] - [address]  [address]  [facility] [address]  Phone: [phone] Fax: [phone]      SW spoke with patient and patient's family to complete discharge planning assessment.  Patient alert and oriented xs 4.  Patient verified all demographic information on facesheet is correct.  Patient verified PCP is Dr. Sunday Lovett.  Patient verified primary health insurance is .  Patient with NO home health but has listed DME.  Patient with NO POA or Living Will.  Patient not on dialysis or medication coumadin.  Patient with no 30 day admission.  Patient with no financial issues at this time.  Patient family will provide transportation upon discharge from facility.  Patient independent with ADLs, live with spouse, family drives.      Initial Assessment (most recent)       Adult Discharge Assessment - 02/12/23 1227          Discharge Assessment    Assessment Type Discharge Planning Assessment     Confirmed/corrected address, phone number and insurance Yes      Confirmed Demographics Correct on Facesheet     Source of Information patient;family     Communicated KWABENA with patient/caregiver Yes     People in Home spouse     Facility Arrived From: home     Do you expect to return to your current living situation? Yes     Do you have help at home or someone to help you manage your care at home? Yes     Who are your caregiver(s) and their phone number(s)? spouse     Prior to hospitilization cognitive status: Alert/Oriented     Current cognitive status: Alert/Oriented     Walking or Climbing Stairs ambulation difficulty, requires equipment;stair climbing difficulty, requires equipment     Equipment Currently Used at Home walker, rolling;wheelchair     Readmission within 30 days? No     Patient currently being followed by outpatient case management? No     Do you currently have service(s) that help you manage your care at home? No     Do you take prescription medications? Yes     Do you have prescription coverage? Yes     Do you have any problems affording any of your prescribed medications? No     Is the patient taking medications as prescribed? yes     Who is going to help you get home at discharge? spouse     How do you get to doctors appointments? family or friend will provide     Are you on dialysis? No     Do you take coumadin? No     Discharge Plan A Home Health     Discharge Plan B Home with family     DME Needed Upon Discharge  none     Discharge Plan discussed with: Patient;Spouse/sig other;Adult children     Discharge Barriers Identified None

## 2023-02-12 NOTE — PROGRESS NOTES
[facility]  Orthopedics  Progress Note    Patient Name: Ana Lilia Matos  MRN: [id number]  Admission Date: 2/10/2023  Hospital Length of Stay: 2 days  Attending Provider: Marlon Cruz MD  Primary Care Provider: Primary Doctor No  Follow-up For: Procedure(s) (LRB):  LAMINECTOMY, SPINE, CERVICAL, WITH POSTERIOR FUSION C3-T1 PLDF DEPUY OREN SINHA SNS: MOTORS/SSEP/EMG (N/A)    Post-Operative Day: 2 Days Post-Op  Subjective:     Principal Problem:Cervical myelopathy    Principal Orthopedic Problem: s/p C3-T1 PCDF 2/10/23    Interval History: POD2. Continues to endorse poor pain control. Aspen collar in place. Vitals stable. Drain output: 15 cc. PT/OT 25 steps yesterday. Now voiding independently s/p Silva dc. Recs for HH. Drain out pending C spine XR.      Review of patient's allergies indicates:   Allergen Reactions    Aspirin Palpitations       Current Facility-Administered Medications   Medication    acetaminophen tablet 650 mg    atorvastatin tablet 20 mg    celecoxib capsule 200 mg    dextrose 10% bolus 125 mL 125 mL    dextrose 10% bolus 250 mL 250 mL    DULoxetine DR capsule 120 mg    fentaNYL 50 mcg/mL injection 25 mcg    glucagon (human recombinant) injection 1 mg    glucose chewable tablet 16 g    glucose chewable tablet 24 g    haloperidol lactate injection 0.5 mg    HYDROmorphone injection 0.2 mg    HYDROmorphone injection 0.5 mg    insulin aspart U-100 pen 0-5 Units    LORazepam tablet 1 mg    methocarbamoL tablet 1,000 mg    ondansetron injection 4 mg    ondansetron injection 4 mg    OXcarbazepine tablet 150 mg    oxyCODONE immediate release tablet 5 mg    oxyCODONE immediate release tablet Tab 10 mg    polyethylene glycol packet 17 g    tiZANidine tablet 4 mg    traZODone tablet 100 mg     Objective:     Vital Signs (Most Recent):  Temp: 97.5 °F (36.4 °C) (02/12/23 0438)  Pulse: 84 (02/12/23 0438)  Resp: 16 (02/12/23 0726)  BP: 110/70 (02/12/23 0438)  SpO2: (!) 92 %  (02/12/23 0438) Vital Signs (24h Range):  Temp:  [97.1 °F (36.2 °C)-98.3 °F (36.8 °C)] 97.5 °F (36.4 °C)  Pulse:  [] 84  Resp:  [16-17] 16  SpO2:  [92 %-100 %] 92 %  BP: (110-138)/(66-79) 110/70     Weight: 59 kg (130 lb)     Body mass index is 22.31 kg/m².      Intake/Output Summary (Last 24 hours) at 2/12/2023 0749  Last data filed at 2/11/2023 1859  Gross per 24 hour   Intake --   Output 45 ml   Net -45 ml       Ortho/SPM Exam  Vitals: Afebrile. Vital signs stable.  General: No acute distress.  Cardio: Regular rate.  Chest: No increased work of breathing.  Incision: c/d/I  Aspen collar in place     Significant Labs: CBC:   Recent Labs   Lab 02/10/23  1326 02/11/23  0536   WBC  --  9.54   HGB  --  12.7   HCT 30* 38.4   PLT  --  222     CMP:   Recent Labs   Lab 02/11/23  0536      K 4.7      CO2 24   *   BUN 5*   CREATININE 0.7   CALCIUM 9.6   ANIONGAP 11     All pertinent labs within the past 24 hours have been reviewed.    Significant Imaging: I have reviewed and interpreted all pertinent imaging results/findings.    Assessment/Plan:     * Cervical myelopathy  Ana Lilia Matos is a 62 y.o. female s/p C3-T1 PCDF on 2/10/23    VS:  Stable   Labs:  Post op Hb 12.7, no further labs   Pain control: multimodal regimen  PT/OT: WBAT with spine precautions   DVT PPx:  Ambulation and mechanical SCDs  Abx:  24h post op ancef completed  Silva: Voiding independently   Drain:  Pulled 2/12; post op XR today    F/u: 4 wks spine clinic     Dispo: Dc home today with HH after C spine XR              Leslye Mcdowell MD  Orthopedics  [facility] - Surgery

## 2023-02-12 NOTE — ANESTHESIA POSTPROCEDURE EVALUATION
Anesthesia Post Evaluation    Patient: Ana Lilia Matos    Procedure(s) Performed: Procedure(s) (LRB):  LAMINECTOMY, SPINE, CERVICAL, WITH POSTERIOR FUSION C3-T1 PLDF BARBARA SINHA SNS: MOTORS/SSEP/EMG (N/A)    Final Anesthesia Type: general      Patient location during evaluation: PACU  Patient participation: Yes- Able to Participate  Level of consciousness: awake and alert  Post-procedure vital signs: reviewed and stable  Pain management: adequate  Airway patency: patent    PONV status at discharge: No PONV  Anesthetic complications: no      Cardiovascular status: blood pressure returned to baseline  Respiratory status: unassisted  Hydration status: euvolemic  Follow-up not needed.              Event Time   Out of Recovery 15:40:00

## 2023-02-12 NOTE — PLAN OF CARE
Patient cleared for discharge from case management standpoint.    CM will follow up with patient tomorrow with home health agency acceptance.       02/12/23 1303   Final Note   Assessment Type Final Discharge Note   Anticipated Discharge Disposition Home-Health   Hospital Resources/Appts/Education Provided Post-Acute resouces added to AVS;Provided patient/caregiver with written discharge plan information

## 2023-02-12 NOTE — SUBJECTIVE & OBJECTIVE
Principal Problem:Cervical myelopathy    Principal Orthopedic Problem: s/p C3-T1 PCDF 2/10/23    Interval History: POD2. Continues to endorse poor pain control. Aspen collar in place. Vitals stable. Drain output: 15 cc. PT/OT 25 steps yesterday. Now voiding independently s/p Silva dc. Recs for HH. Drain out pending C spine XR.      Review of patient's allergies indicates:   Allergen Reactions    Aspirin Palpitations       Current Facility-Administered Medications   Medication    acetaminophen tablet 650 mg    atorvastatin tablet 20 mg    celecoxib capsule 200 mg    dextrose 10% bolus 125 mL 125 mL    dextrose 10% bolus 250 mL 250 mL    DULoxetine DR capsule 120 mg    fentaNYL 50 mcg/mL injection 25 mcg    glucagon (human recombinant) injection 1 mg    glucose chewable tablet 16 g    glucose chewable tablet 24 g    haloperidol lactate injection 0.5 mg    HYDROmorphone injection 0.2 mg    HYDROmorphone injection 0.5 mg    insulin aspart U-100 pen 0-5 Units    LORazepam tablet 1 mg    methocarbamoL tablet 1,000 mg    ondansetron injection 4 mg    ondansetron injection 4 mg    OXcarbazepine tablet 150 mg    oxyCODONE immediate release tablet 5 mg    oxyCODONE immediate release tablet Tab 10 mg    polyethylene glycol packet 17 g    tiZANidine tablet 4 mg    traZODone tablet 100 mg     Objective:     Vital Signs (Most Recent):  Temp: 97.5 °F (36.4 °C) (02/12/23 0438)  Pulse: 84 (02/12/23 0438)  Resp: 16 (02/12/23 0726)  BP: 110/70 (02/12/23 0438)  SpO2: (!) 92 % (02/12/23 0438) Vital Signs (24h Range):  Temp:  [97.1 °F (36.2 °C)-98.3 °F (36.8 °C)] 97.5 °F (36.4 °C)  Pulse:  [] 84  Resp:  [16-17] 16  SpO2:  [92 %-100 %] 92 %  BP: (110-138)/(66-79) 110/70     Weight: 59 kg (130 lb)     Body mass index is 22.31 kg/m².      Intake/Output Summary (Last 24 hours) at 2/12/2023 1903  Last data filed at 2/11/2023 1859  Gross per 24 hour   Intake --   Output 45 ml   Net -45 ml       Ortho/SPM Exam  Vitals: Afebrile. Vital  signs stable.  General: No acute distress.  Cardio: Regular rate.  Chest: No increased work of breathing.  Incision: c/d/I  Aspen collar in place     Significant Labs: CBC:   Recent Labs   Lab 02/10/23  1326 02/11/23  0536   WBC  --  9.54   HGB  --  12.7   HCT 30* 38.4   PLT  --  222     CMP:   Recent Labs   Lab 02/11/23  0536      K 4.7      CO2 24   *   BUN 5*   CREATININE 0.7   CALCIUM 9.6   ANIONGAP 11     All pertinent labs within the past 24 hours have been reviewed.    Significant Imaging: I have reviewed and interpreted all pertinent imaging results/findings.

## 2023-02-12 NOTE — PLAN OF CARE
[facility]      HOME HEALTH ORDERS  FACE TO FACE ENCOUNTER    Patient Name: Ana Lilia Matos  YOB: 1960    PCP: Primary Doctor No   PCP Address: None  PCP Phone Number: None  PCP Fax: None    Encounter Date: 11/21/22    Admit to Home Health    Diagnoses:  Active Hospital Problems    Diagnosis  POA    *Cervical myelopathy [G95.9]  Yes      Resolved Hospital Problems   No resolved problems to display.       Follow Up Appointments:  Future Appointments   Date Time Provider Department Center   3/10/2023 10:30 AM Sharifa Delacruz PA-C [facility]       Allergies:  Review of patient's allergies indicates:   Allergen Reactions    Aspirin Palpitations       Medications: Review discharge medications with patient and family and provide education.      I have seen and examined this patient within the last 30 days. My clinical findings that support the need for the home health skilled services and home bound status are the following:no   Weakness/numbness causing balance and gait disturbance due to Surgery making it taxing to leave home.     Diet:   regular diet    Labs:  none    Referrals/ Consults  Physical Therapy to evaluate and treat. Evaluate for home safety and equipment needs; Establish/upgrade home exercise program. Perform / instruct on therapeutic exercises, gait training, transfer training, and Range of Motion.  Occupational Therapy to evaluate and treat. Evaluate home environment for safety and equipment needs. Perform/Instruct on transfers, ADL training, ROM, and therapeutic exercises.    Activities:   other weight bear as tolerated with spine precautions; Aspen collar on at all times except when eating or bathing     Nursing:   Agency to admit patient within 24 hours of hospital discharge unless specified on physician order or at patient request    SN to complete comprehensive assessment including routine vital signs. Instruct on disease process and s/s of complications to  report to MD. Review/verify medication list sent home with the patient at time of discharge  and instruct patient/caregiver as needed. Frequency may be adjusted depending on start of care date.     Skilled nurse to perform up to 3 visits PRN for symptoms related to diagnosis    Notify MD if SBP > 160 or < 90; DBP > 90 or < 50; HR > 120 or < 50; Temp > 101; O2 < 88%;     Ok to schedule additional visits based on staff availability and patient request on consecutive days within the home health episode.    Miscellaneous   none    Home Health Aide:  none    Wound Care Orders  Leave surgical dressing in place for 7 days post op then ok to remove and shower.     I certify that this patient is confined to her home and needs physical therapy and occupational therapy.

## 2023-02-12 NOTE — DISCHARGE SUMMARY
[facility]  Orthopedics  Discharge Summary      Patient Name: Ana Lilia Matos  MRN: [id number]  Admission Date: 2/10/2023  Hospital Length of Stay: 2 days  Discharge Date and Time:  02/12/2023 11:36 AM  Attending Physician: Marlon Cruz MD   Discharging Provider: Leslye Mcdowell MD  Primary Care Provider: Primary Doctor No    HPI:   Ana Lilia Matos is a 62 y.o. female w/ a hx of C4-7 ACDF 13 years ago presents for initial evaluation of neck and bilateral arm pain (Neck - 7, Arm - 7). The pain has been present for 5 years. The patient describes the pain as dull and it radiates down BUE to fingers. The pain is worse with activity and improved by rest. There is BUE associated numbness and tingling. There is BUE subjective weakness. Prior treatments have included meds (zanaflex, ibuprofen), PT (did not help), injections (facet blocks, etc.), but no recent surgery.      The Patient endorses myelopathic symptoms such as handwriting changes or difficulty with buttons/coins/keys. Denies perineal paresthesias, bowel/bladder dysfunction.     The patient does not smoke or endorse IVDU. She has DM (HA1C of 5.8). The patient is not on any blood thinners and does not take chronic narcotics. She is disabled due to her neck. She was accompanied by her 2 daughters, and they drove 5 hours from [place].      Procedure(s) (LRB):  LAMINECTOMY, SPINE, CERVICAL, WITH POSTERIOR FUSION C3-T1 PLDF BARBARA SINHA SNS: MOTORS/SSEP/EMG (N/A)      Hospital Course:  the patient arrived to pre-op area for proper pre-operative management.  Upon completion of pre-operative preparation, the patient was taken back to the operative theatre.  A C3-T1 PCDF was performed without complication and the patient was transported to the post anesthesia care unit in stable condition. After appropriate recovery from the anaesthetic agents used during the surgery the patient was transferred to the floor where they received pain medication and physical  therapy. When the patient was deemed medically safe for DC, they were discharged to home on a multimodal pain regimen and a 4 week post-op clinic appointment.        Goals of Care Treatment Preferences:  Code Status: Full Code          Significant Diagnostic Studies: Labs: All labs within the past 24 hours have been reviewed    Pending Diagnostic Studies:     None        Final Active Diagnoses:    Diagnosis Date Noted POA    PRINCIPAL PROBLEM:  Cervical myelopathy [G95.9] 02/11/2023 Yes      Problems Resolved During this Admission:      Discharged Condition: stable    Disposition[place] [facility]    Medications:  Reconciled Home Medications:      Medication List      START taking these medications    acetaminophen 500 MG tablet  Commonly known as: TYLENOL  Take 2 tablets (1,000 mg total) by mouth every 8 (eight) hours. for 14 days     celecoxib 200 MG capsule  Commonly known as: CeleBREX  Take 1 capsule (200 mg total) by mouth once daily. for 14 days     methocarbamoL 750 MG Tab  Commonly known as: ROBAXIN  Take 1 tablet (750 mg total) by mouth 3 (three) times daily. for 14 days     oxyCODONE 5 MG immediate release tablet  Commonly known as: ROXICODONE  Take 1 tablet (5 mg total) by mouth every 4 (four) hours as needed for Pain.        STOP taking these medications    tiZANidine 4 MG tablet  Commonly known as: ZANAFLEX        ASK your doctor about these medications    atorvastatin 20 MG tablet  Commonly known as: LIPITOR  Take 20 mg by mouth once daily.     DULoxetine 20 MG capsule  Commonly known as: CYMBALTA  Take 60 mg by mouth 2 (two) times daily. SAID SHE TAKES 120 MG  IN THE AM     esomeprazole 20 MG capsule  Commonly known as: NEXIUM  Take 20 mg by mouth once daily. TAKES 2  (20 MG) FOR A TOTAL OF 40 MG IN THE AM.     fexofenadine-pseudoephedrine  mg  mg per tablet  Commonly known as: ALLEGRA-D  Take 1 tablet by mouth nightly.     fluticasone propionate 50 mcg/actuation nasal spray  Commonly  known as: FLONASE  2 sprays by Each Nostril route once daily.     LORazepam 1 MG tablet  Commonly known as: ATIVAN  Take 1 mg by mouth 3 (three) times daily.     metFORMIN 1000 MG tablet  Commonly known as: GLUCOPHAGE  Take 1,000 mg by mouth 2 (two) times daily with meals.     OXcarbazepine 150 MG Tab  Commonly known as: TRILEPTAL  Take 150 mg by mouth 2 (two) times daily.     traZODone 100 MG tablet  Commonly known as: DESYREL  Take 100 mg by mouth nightly.            Leslye Mcdowell MD  Orthopedics  [facility] - Surgery

## 2023-02-12 NOTE — PLAN OF CARE
SW spoke with patient regarding PT/OT recommendations of home health.  Patient in agreement with choice of [facility] home health.  SW sent patient information to [employer] via [employer] system.       02/12/23 1230   Post-Acute Status   Post-Acute Authorization Home Health   Home Health Status Referrals Sent   Patient choice form signed by patient/caregiver List with quality metrics by geographic area provided

## 2023-02-12 NOTE — ASSESSMENT & PLAN NOTE
Ana Lilia Matos is a 62 y.o. female s/p C3-T1 PCDF on 2/10/23    VS:  Stable   Labs:  Post op Hb 12.7, no further labs   Pain control: multimodal regimen  PT/OT: WBAT with spine precautions   DVT PPx:  Ambulation and mechanical SCDs  Abx:  24h post op ancef completed  Silva: Voiding independently   Drain:  Pulled 2/12; post op XR today    F/u: 4 wks spine clinic     Dispo: Dc home today with HH after C spine XR

## 2023-02-14 ENCOUNTER — PATIENT MESSAGE (OUTPATIENT)
Dept: ORTHOPEDICS | Facility: CLINIC | Age: 63
End: 2023-02-14
Payer: OTHER GOVERNMENT

## 2023-02-16 DIAGNOSIS — Z98.1 S/P CERVICAL SPINAL FUSION: Primary | ICD-10-CM

## 2023-02-17 ENCOUNTER — NURSE TRIAGE (OUTPATIENT)
Dept: ADMINISTRATIVE | Facility: CLINIC | Age: 63
End: 2023-02-17
Payer: OTHER GOVERNMENT

## 2023-02-17 NOTE — TELEPHONE ENCOUNTER
Reason for Disposition   Caller has URGENT question and triager unable to answer question    Additional Information   Negative: Sounds like a life-threatening emergency to the triager   Negative: Chest pain   Negative: Difficulty breathing   Negative: Acting confused (e.g., disoriented, slurred speech) or excessively sleepy   Negative: Surgical incision symptoms and questions   Negative: Pain or burning with passing urine (urination) and male   Negative: Pain or burning with passing urine (urination) and female   Negative: Constipation   Negative: New or worsening leg (calf, thigh) pain   Negative: New or worsening leg swelling   Negative: Dizziness is severe, or persists > 24 hours after surgery   Negative: Symptoms arising from use of a urinary catheter (Silva or Coude)   Negative: Cast problems or questions   Negative: Medication question   Negative: Bright red, wide-spread, sunburn-like rash   Negative: SEVERE headache and after spinal (epidural) anesthesia   Negative: Vomiting and persists > 4 hours   Negative: Vomiting and abdomen looks much more swollen than usual   Negative: Drinking very little and dehydration suspected (e.g., no urine > 12 hours, very dry mouth, very lightheaded)   Negative: Patient sounds very sick or weak to the triager   Negative: Sounds like a serious complication to the triager   Negative: Fever > 100.4 F (38.0 C)    Protocols used: Post-Op Symptoms and [id number]  Surg 2/10  Pt states dc papers say pt can shower and change dressing. Pt states wants know something more specific. Can she use her own shampoo and shower wash. Or does she need to use anti bacterial soap. Pt notified she may use regular soap but defer to surgeon re antibacterial soap. Pt reports she cant remember anything - can barely remember coming home. Pt notified this likely related to anesthesia. Pt on methocarbamol, Tylenol and only taking oxy for severe pain. Pt never received Celebrex. Pt asking to have med  sent to [facility] pharmacy. Pt warm transferred to speak with agent in registration with clinic. Urgent office message sent to surgeon to call pt.

## 2023-02-20 ENCOUNTER — TELEPHONE (OUTPATIENT)
Dept: ORTHOPEDICS | Facility: CLINIC | Age: 63
End: 2023-02-20
Payer: OTHER GOVERNMENT

## 2023-02-20 RX ORDER — CELECOXIB 200 MG/1
200 CAPSULE ORAL DAILY
Qty: 14 CAPSULE | Refills: 0 | Status: SHIPPED | OUTPATIENT
Start: 2023-02-20 | End: 2023-03-10 | Stop reason: SDUPTHER

## 2023-02-20 NOTE — TELEPHONE ENCOUNTER
----- Message from Dahlia Skinner sent at 2/18/2023 11:54 AM CST -----  Regarding: FW: POST OP ISSUES  Contact: Self    ----- Message -----  From: Ewa Lacey  Sent: 2/17/2023   4:04 PM CST  To: Nancy Mason Staff  Subject: POST OP ISSUES                                   Pt stated she need to speak to someone as soon as possible regarding her bandages and questions regarding showering as well as other questions, ask for a call back today as soon as possible please      Contact info   [phone] Phone

## 2023-02-20 NOTE — TELEPHONE ENCOUNTER
Spoke with patient and informed her of HAILEY Delacruz message and she stated an verbal understanding.

## 2023-02-22 ENCOUNTER — TELEPHONE (OUTPATIENT)
Dept: ORTHOPEDICS | Facility: CLINIC | Age: 63
End: 2023-02-22
Payer: OTHER GOVERNMENT

## 2023-02-22 ENCOUNTER — PATIENT MESSAGE (OUTPATIENT)
Dept: ORTHOPEDICS | Facility: CLINIC | Age: 63
End: 2023-02-22
Payer: OTHER GOVERNMENT

## 2023-02-22 NOTE — TELEPHONE ENCOUNTER
Reached out to pt to let her know that she has to wear her collar brace until she follows up with her first Post Op appointment. Pt stated she just wanted to be sure because her  read to her that it said she could take it o ff while sleeping so she had slept with it off. She stated that her tech from PT told her that her records said that she has to wear it 24/7, so that's why she called to confirm. I told pt that its ok to not wear it when eating but otherwise she has to wear it.  Pt verbally agreed to understanding.                 ----- Message from Aaliyah Pena sent at 2/22/2023  3:48 PM CST -----  Regarding: Hard Collar Brace  Contact: Pt @ [phone]  Pt is calling to verify is she need to wear the hard collar brace to bed. Asking for a call back

## 2023-03-02 ENCOUNTER — TELEPHONE (OUTPATIENT)
Dept: ORTHOPEDICS | Facility: CLINIC | Age: 63
End: 2023-03-02
Payer: OTHER GOVERNMENT

## 2023-03-02 NOTE — TELEPHONE ENCOUNTER
Spoke with pt letting her know that  was scheduled in to the OR on the date of her original scheduled appointment (3/10/23 @10:30 AM) but , one of 's colleagues will be in clinic. I told pt she had slots of 930 and 11 AM for a post op visit. Pt stated that the 11 AM time for 3/10/23 with  would be okay.           ----- Message from Sharifa Delacruz PA-C sent at 3/2/2023 12:02 PM CST -----  I have to skip clinic next Friday and go to the OR, can you see if she is ok with seeing melanie at 930 or 11? She was originally scheduled with me at 1030

## 2023-03-03 RX ORDER — TIZANIDINE 4 MG/1
4 TABLET ORAL EVERY 8 HOURS PRN
Qty: 60 TABLET | Refills: 2 | Status: SHIPPED | OUTPATIENT
Start: 2023-03-03 | End: 2023-10-12

## 2023-03-07 NOTE — PROGRESS NOTES
Date: 03/07/2023    Supervising Physician: Marlon Cruz M.D.    Date of Surgery: 2/10/23    Procedure: C3-T1 posterior fusion    History: Ana Lilia Matos is seen today for follow-up following the above listed procedure. Overall the patient is doing well but today notes her left arm pain has significantly improved since surgery. She is feeling good with mild neck soreness. She reports some numbness and tingling in her arms with PT/OT but it has improved since surgery. She is very pleased with her pain relief.  Pain is well controlled with current pain medication.  she denies fever, chills, and sweats since the time of the surgery.     Exam: Post op dressing taken down.  Incision is healing well, clean, dry and intact.   There is no sign of infection. Neuro exam is stable. No signs of DVT.    Radiographs: hardware in place no failure. Slight worsening of anterolisthesis of C2 on C3.    Assessment/Plan: 4 weeks post op.    Doing well postoperatively.  reviewed. Will wean out of collar in 4 weeks.    I will plan to see the patient back for the next postop visit in 2 months.     Thank you for the opportunity to participate in this patient's care. Please give me a call if there are any concerns or questions.

## 2023-03-10 ENCOUNTER — PATIENT MESSAGE (OUTPATIENT)
Dept: ORTHOPEDICS | Facility: CLINIC | Age: 63
End: 2023-03-10

## 2023-03-10 ENCOUNTER — HOSPITAL ENCOUNTER (OUTPATIENT)
Dept: RADIOLOGY | Facility: HOSPITAL | Age: 63
Discharge: HOME | End: 2023-03-10
Attending: REGISTERED NURSE
Payer: OTHER GOVERNMENT

## 2023-03-10 ENCOUNTER — OFFICE VISIT (OUTPATIENT)
Dept: ORTHOPEDICS | Facility: CLINIC | Age: 63
End: 2023-03-10
Payer: OTHER GOVERNMENT

## 2023-03-10 VITALS — WEIGHT: 140.63 LBS | HEIGHT: 64 IN | BODY MASS INDEX: 24.01 KG/M2

## 2023-03-10 DIAGNOSIS — Z98.1 S/P CERVICAL SPINAL FUSION: ICD-10-CM

## 2023-03-10 DIAGNOSIS — Z98.1 S/P CERVICAL SPINAL FUSION: Primary | ICD-10-CM

## 2023-03-10 PROCEDURE — 72040 X-RAY EXAM NECK SPINE 2-3 VW: CPT | Mod: 26,,, | Performed by: RADIOLOGY

## 2023-03-10 PROCEDURE — 99213 OFFICE O/P EST LOW 20 MIN: CPT | Mod: PBBFAC | Performed by: ORTHOPAEDIC SURGERY

## 2023-03-10 PROCEDURE — 99999 PR PBB SHADOW E&M-EST. PATIENT-LVL III: ICD-10-PCS | Mod: PBBFAC,,, | Performed by: ORTHOPAEDIC SURGERY

## 2023-03-10 PROCEDURE — 72040 X-RAY EXAM NECK SPINE 2-3 VW: CPT | Mod: TC

## 2023-03-10 PROCEDURE — 99024 POSTOP FOLLOW-UP VISIT: CPT | Mod: ,,, | Performed by: ORTHOPAEDIC SURGERY

## 2023-03-10 PROCEDURE — 72040 XR CERVICAL SPINE AP LATERAL: ICD-10-PCS | Mod: 26,,, | Performed by: RADIOLOGY

## 2023-03-10 PROCEDURE — 99999 PR PBB SHADOW E&M-EST. PATIENT-LVL III: CPT | Mod: PBBFAC,,, | Performed by: ORTHOPAEDIC SURGERY

## 2023-03-10 PROCEDURE — 99024 PR POST-OP FOLLOW-UP VISIT: ICD-10-PCS | Mod: ,,, | Performed by: ORTHOPAEDIC SURGERY

## 2023-03-10 RX ORDER — CELECOXIB 200 MG/1
200 CAPSULE ORAL DAILY
Qty: 30 CAPSULE | Refills: 0 | Status: SHIPPED | OUTPATIENT
Start: 2023-03-10 | End: 2023-04-09

## 2023-03-13 ENCOUNTER — PATIENT MESSAGE (OUTPATIENT)
Dept: ADMINISTRATIVE | Facility: OTHER | Age: 63
End: 2023-03-13
Payer: OTHER GOVERNMENT

## 2023-04-04 ENCOUNTER — PATIENT MESSAGE (OUTPATIENT)
Dept: ORTHOPEDICS | Facility: CLINIC | Age: 63
End: 2023-04-04
Payer: OTHER GOVERNMENT

## 2023-04-04 ENCOUNTER — PATIENT MESSAGE (OUTPATIENT)
Dept: RESEARCH | Facility: HOSPITAL | Age: 63
End: 2023-04-04
Payer: OTHER GOVERNMENT

## 2023-04-10 ENCOUNTER — PATIENT MESSAGE (OUTPATIENT)
Dept: ORTHOPEDICS | Facility: CLINIC | Age: 63
End: 2023-04-10
Payer: OTHER GOVERNMENT

## 2023-05-15 ENCOUNTER — HOSPITAL ENCOUNTER (OUTPATIENT)
Dept: RADIOLOGY | Facility: HOSPITAL | Age: 63
Discharge: HOME | End: 2023-05-15
Attending: ORTHOPAEDIC SURGERY
Payer: OTHER GOVERNMENT

## 2023-05-15 ENCOUNTER — OFFICE VISIT (OUTPATIENT)
Dept: ORTHOPEDICS | Facility: CLINIC | Age: 63
End: 2023-05-15
Payer: OTHER GOVERNMENT

## 2023-05-15 VITALS — HEIGHT: 66 IN | WEIGHT: 143.31 LBS | BODY MASS INDEX: 23.03 KG/M2

## 2023-05-15 DIAGNOSIS — Z98.1 S/P CERVICAL SPINAL FUSION: ICD-10-CM

## 2023-05-15 DIAGNOSIS — Z98.1 S/P CERVICAL SPINAL FUSION: Primary | ICD-10-CM

## 2023-05-15 PROCEDURE — 72040 XR CERVICAL SPINE AP LATERAL: ICD-10-PCS | Mod: 26,,, | Performed by: INTERNAL MEDICINE

## 2023-05-15 PROCEDURE — 99024 PR POST-OP FOLLOW-UP VISIT: ICD-10-PCS | Mod: ,,, | Performed by: ORTHOPAEDIC SURGERY

## 2023-05-15 PROCEDURE — 99999 PR PBB SHADOW E&M-EST. PATIENT-LVL III: CPT | Mod: PBBFAC,,, | Performed by: ORTHOPAEDIC SURGERY

## 2023-05-15 PROCEDURE — 72040 X-RAY EXAM NECK SPINE 2-3 VW: CPT | Mod: 26,,, | Performed by: INTERNAL MEDICINE

## 2023-05-15 PROCEDURE — 99213 OFFICE O/P EST LOW 20 MIN: CPT | Mod: PBBFAC | Performed by: ORTHOPAEDIC SURGERY

## 2023-05-15 PROCEDURE — 99999 PR PBB SHADOW E&M-EST. PATIENT-LVL III: ICD-10-PCS | Mod: PBBFAC,,, | Performed by: ORTHOPAEDIC SURGERY

## 2023-05-15 PROCEDURE — 72040 X-RAY EXAM NECK SPINE 2-3 VW: CPT | Mod: TC

## 2023-05-15 PROCEDURE — 99024 POSTOP FOLLOW-UP VISIT: CPT | Mod: ,,, | Performed by: ORTHOPAEDIC SURGERY

## 2023-05-15 NOTE — PROGRESS NOTES
Date: 05/15/2023    Supervising Physician: Marlon Cruz M.D.    Date of Surgery:  2/10/23     Procedure: C3-T1 posterior fusion       History: Ana Lilia Matos is seen today for follow-up following the above listed procedure. Overall the patient is doing well but today notes she has no pain and is feeling great.   Pain is well controlled with current pain medication.  she denies fever, chills, and sweats since the time of the surgery.       Exam: Post op dressing taken down.  Incision is healing well, clean, dry and intact.   There is no sign of infection. Neuro exam is stable. No signs of DVT.    Radiographs: hardware in place no failure    Assessment/Plan: 3 months post op.    Doing well postoperatively.  reviewed. Will wean out of collar. Pt has no lifting bending twisting restrictions as of today.    I will plan to see the patient back for the next postop visit in 9 months.     Thank you for the opportunity to participate in this patient's care. Please give me a call if there are any concerns or questions.

## 2023-05-16 ENCOUNTER — PATIENT MESSAGE (OUTPATIENT)
Dept: ORTHOPEDICS | Facility: CLINIC | Age: 63
End: 2023-05-16
Payer: OTHER GOVERNMENT

## 2023-06-10 ENCOUNTER — PATIENT MESSAGE (OUTPATIENT)
Dept: ORTHOPEDICS | Facility: CLINIC | Age: 63
End: 2023-06-10
Payer: OTHER GOVERNMENT

## 2023-06-10 ENCOUNTER — PATIENT MESSAGE (OUTPATIENT)
Dept: ADMINISTRATIVE | Facility: OTHER | Age: 63
End: 2023-06-10
Payer: OTHER GOVERNMENT

## 2023-06-13 ENCOUNTER — PATIENT MESSAGE (OUTPATIENT)
Dept: ADMINISTRATIVE | Facility: OTHER | Age: 63
End: 2023-06-13
Payer: OTHER GOVERNMENT

## 2023-06-13 RX ORDER — METHYLPREDNISOLONE 4 MG/1
TABLET ORAL
Qty: 1 EACH | Refills: 0 | Status: SHIPPED | OUTPATIENT
Start: 2023-06-13 | End: 2023-07-04

## 2023-06-14 ENCOUNTER — PATIENT MESSAGE (OUTPATIENT)
Dept: ADMINISTRATIVE | Facility: OTHER | Age: 63
End: 2023-06-14
Payer: OTHER GOVERNMENT

## 2023-08-01 ENCOUNTER — PATIENT MESSAGE (OUTPATIENT)
Dept: ORTHOPEDICS | Facility: CLINIC | Age: 63
End: 2023-08-01
Payer: OTHER GOVERNMENT

## 2023-08-30 ENCOUNTER — PATIENT MESSAGE (OUTPATIENT)
Dept: ORTHOPEDICS | Facility: CLINIC | Age: 63
End: 2023-08-30
Payer: OTHER GOVERNMENT

## 2023-09-01 RX ORDER — CYCLOBENZAPRINE HCL 5 MG
5 TABLET ORAL 3 TIMES DAILY PRN
Qty: 90 TABLET | Refills: 0 | Status: SHIPPED | OUTPATIENT
Start: 2023-09-01 | End: 2023-10-01

## 2023-09-19 ENCOUNTER — PATIENT MESSAGE (OUTPATIENT)
Dept: ORTHOPEDICS | Facility: CLINIC | Age: 63
End: 2023-09-19
Payer: OTHER GOVERNMENT

## 2023-09-26 ENCOUNTER — PATIENT MESSAGE (OUTPATIENT)
Dept: ORTHOPEDICS | Facility: CLINIC | Age: 63
End: 2023-09-26
Payer: OTHER GOVERNMENT

## 2023-10-12 RX ORDER — TIZANIDINE 4 MG/1
4 TABLET ORAL EVERY 8 HOURS PRN
Qty: 60 TABLET | Refills: 2 | Status: SHIPPED | OUTPATIENT
Start: 2023-10-12

## 2023-10-18 ENCOUNTER — PATIENT MESSAGE (OUTPATIENT)
Dept: ORTHOPEDICS | Facility: CLINIC | Age: 63
End: 2023-10-18
Payer: OTHER GOVERNMENT

## 2023-10-19 ENCOUNTER — TELEPHONE (OUTPATIENT)
Dept: ORTHOPEDICS | Facility: CLINIC | Age: 63
End: 2023-10-19
Payer: OTHER GOVERNMENT

## 2023-10-19 DIAGNOSIS — Z98.1 S/P CERVICAL SPINAL FUSION: Primary | ICD-10-CM

## 2023-11-06 ENCOUNTER — OFFICE VISIT (OUTPATIENT)
Dept: ORTHOPEDICS | Facility: CLINIC | Age: 63
End: 2023-11-06
Payer: OTHER GOVERNMENT

## 2023-11-06 ENCOUNTER — HOSPITAL ENCOUNTER (OUTPATIENT)
Dept: RADIOLOGY | Facility: HOSPITAL | Age: 63
Discharge: HOME | End: 2023-11-06
Attending: ORTHOPAEDIC SURGERY
Payer: OTHER GOVERNMENT

## 2023-11-06 VITALS — HEIGHT: 66 IN | BODY MASS INDEX: 22.66 KG/M2 | WEIGHT: 141 LBS

## 2023-11-06 DIAGNOSIS — Z98.1 S/P CERVICAL SPINAL FUSION: ICD-10-CM

## 2023-11-06 DIAGNOSIS — Z98.1 S/P CERVICAL SPINAL FUSION: Primary | ICD-10-CM

## 2023-11-06 PROCEDURE — 99024 POSTOP FOLLOW-UP VISIT: CPT | Mod: ,,, | Performed by: ORTHOPAEDIC SURGERY

## 2023-11-06 PROCEDURE — 99213 OFFICE O/P EST LOW 20 MIN: CPT | Mod: PBBFAC | Performed by: ORTHOPAEDIC SURGERY

## 2023-11-06 PROCEDURE — 72040 XR CERVICAL SPINE AP LATERAL: ICD-10-PCS | Mod: 26,,, | Performed by: RADIOLOGY

## 2023-11-06 PROCEDURE — 72040 X-RAY EXAM NECK SPINE 2-3 VW: CPT | Mod: 26,,, | Performed by: RADIOLOGY

## 2023-11-06 PROCEDURE — 99024 PR POST-OP FOLLOW-UP VISIT: ICD-10-PCS | Mod: ,,, | Performed by: ORTHOPAEDIC SURGERY

## 2023-11-06 PROCEDURE — 99999 PR PBB SHADOW E&M-EST. PATIENT-LVL III: ICD-10-PCS | Mod: PBBFAC,,, | Performed by: ORTHOPAEDIC SURGERY

## 2023-11-06 PROCEDURE — 99999 PR PBB SHADOW E&M-EST. PATIENT-LVL III: CPT | Mod: PBBFAC,,, | Performed by: ORTHOPAEDIC SURGERY

## 2023-11-06 PROCEDURE — 72040 X-RAY EXAM NECK SPINE 2-3 VW: CPT | Mod: TC

## 2023-11-06 NOTE — PROGRESS NOTES
Date: 11/06/2023    Supervising Physician: Marlon Cruz M.D.    Date of Surgery: 2/10/23     Procedure: C3-T1 posterior fusion    History: Ana Lilia Matos is seen today for follow-up following the above listed procedure. Overall the patient is doing well but today notes she continues to have neck and shoulder pain.   Pain is well controlled with current pain medication.  she denies fever, chills, and sweats since the time of the surgery.       Exam: Post op dressing taken down.  Incision is healing well, clean, dry and intact.   There is no sign of infection. Neuro exam is stable. No signs of DVT.    Radiographs: hardware in place, anterolisthesis of C2 on C3.    Assessment/Plan: 9 months post op.    Will obtain MRI to evaluate for adjacent level pathology and call with results.    Thank you for the opportunity to participate in this patient's care. Please give me a call if there are any concerns or questions.

## 2023-11-06 NOTE — LETTER
November 6, 2023      [id number]  [address]  [facility] [address]  Phone: [phone]       Patient: Ana Lilia Matos   YOB: 1960  Date of Visit: 11/06/2023    To Whom It May Concern:    Patient: Ana Lilia Matos   YOB: 1960  Date of Visit: 10/19/2023     To Whom It May Concern:     Mariam Matos  had posterior cervical spine surgery at [facility] on 2/10/2023. Due to surgery, patient has lost some mobility in my neck and still has chronic pain/muscle spasms in her neck and shoulders with ADLs, which may be chronic. We will be obtaining an MRI to further evaluate continued pain. If you have any questions or concerns, or if I can be of further assistance, please do not hesitate to contact me.     Sincerely,     Sharifa Delacruz PA-C       Sincerely,    Sharifa Delacruz PA-C

## 2023-11-16 ENCOUNTER — PATIENT MESSAGE (OUTPATIENT)
Dept: ORTHOPEDICS | Facility: CLINIC | Age: 63
End: 2023-11-16
Payer: OTHER GOVERNMENT

## 2024-02-18 ENCOUNTER — PATIENT MESSAGE (OUTPATIENT)
Dept: ORTHOPEDICS | Facility: CLINIC | Age: 64
End: 2024-02-18
Payer: OTHER GOVERNMENT

## 2024-02-20 DIAGNOSIS — Z98.1 S/P CERVICAL SPINAL FUSION: Primary | ICD-10-CM

## 2024-02-20 NOTE — PROGRESS NOTES
Spoke with pt virtually. Pt was last seen 11/6/23 and continues to have neck pain. She is sp C3-T1 posterior fusion on 2/10/23. Pt has tried home exercises and medication for 8 weeks with worsening of pain. Pain is 8/10. I have provided the patient with a home exercise program. It is the [employer] cervical exercise program. Exercises include walking erectly with neutral head position, supine neutral head position, supine retraction, sitting or standing neck retraction, posture training, forward/backward/sideward isometric strengthening, prone head lifts, supine head lifts, scapular retraction, and neck rotation. Pt completed each exercise daily for one hour with worsening of pain. Will obtain MRI to further evaluate and call with results.

## 2024-04-08 ENCOUNTER — OFFICE VISIT (OUTPATIENT)
Dept: ORTHOPEDICS | Facility: CLINIC | Age: 64
End: 2024-04-08
Payer: MEDICARE

## 2024-04-08 ENCOUNTER — HOSPITAL ENCOUNTER (OUTPATIENT)
Dept: RADIOLOGY | Facility: HOSPITAL | Age: 64
Discharge: HOME | End: 2024-04-08
Attending: ORTHOPAEDIC SURGERY
Payer: MEDICARE

## 2024-04-08 VITALS — HEIGHT: 66 IN | WEIGHT: 154.13 LBS | BODY MASS INDEX: 24.77 KG/M2

## 2024-04-08 DIAGNOSIS — M50.30 DDD (DEGENERATIVE DISC DISEASE), CERVICAL: ICD-10-CM

## 2024-04-08 DIAGNOSIS — M47.812 CERVICAL SPONDYLOSIS: ICD-10-CM

## 2024-04-08 DIAGNOSIS — M48.02 SPINAL STENOSIS, CERVICAL REGION: ICD-10-CM

## 2024-04-08 DIAGNOSIS — Z98.1 S/P CERVICAL SPINAL FUSION: Primary | ICD-10-CM

## 2024-04-08 DIAGNOSIS — Z98.1 S/P CERVICAL SPINAL FUSION: ICD-10-CM

## 2024-04-08 LAB
CREAT SERPL-MCNC: 0.7 MG/DL (ref 0.5–1.4)
SAMPLE: NORMAL

## 2024-04-08 PROCEDURE — 99214 OFFICE O/P EST MOD 30 MIN: CPT | Mod: S$PBB,GC,, | Performed by: ORTHOPAEDIC SURGERY

## 2024-04-08 PROCEDURE — 99999 PR PBB SHADOW E&M-EST. PATIENT-LVL III: CPT | Mod: PBBFAC,,, | Performed by: ORTHOPAEDIC SURGERY

## 2024-04-08 PROCEDURE — 99213 OFFICE O/P EST LOW 20 MIN: CPT | Mod: PBBFAC,25 | Performed by: ORTHOPAEDIC SURGERY

## 2024-04-08 PROCEDURE — 72156 MRI NECK SPINE W/O & W/DYE: CPT | Mod: TC

## 2024-04-08 PROCEDURE — A9585 GADOBUTROL INJECTION: HCPCS | Performed by: ORTHOPAEDIC SURGERY

## 2024-04-08 PROCEDURE — 25500020 PHARM REV CODE 255: Performed by: ORTHOPAEDIC SURGERY

## 2024-04-08 PROCEDURE — 72156 MRI NECK SPINE W/O & W/DYE: CPT | Mod: 26,,, | Performed by: RADIOLOGY

## 2024-04-08 RX ORDER — GADOBUTROL 604.72 MG/ML
6 INJECTION INTRAVENOUS
Status: COMPLETED | OUTPATIENT
Start: 2024-04-08 | End: 2024-04-08

## 2024-04-08 RX ADMIN — GADOBUTROL 6 ML: 604.72 INJECTION INTRAVENOUS at 10:04

## 2024-04-08 NOTE — PROGRESS NOTES
"DATE: 4/9/2024  PATIENT: Ana Lilia Matos    Attending Physician: Marlon Cruz M.D.    2/10/23: C3-T1 PCDF    HISTORY:  Ana Lilia Matos is a 63 y.o. female who returns to me today for MRI review. She reports that anytime she performs ADLs/use her arms she has a tightness in her neck and shoulder. She reports that she has been having "electrical impulses" going down from her neck to her left shoulder and anterior chest wall and that her hands will periodically go numb. She reports that the numbness in her hands alternates and is diffuse in nature. She reports episodes of dizziness also. She reports she has been evaluated by a neurologist with no relief of her symptoms related to her dizziness.     The patient does not smoke or endorse IVDU. She has DM (HA1C of 5.8). The patient is not on any blood thinners and does not take chronic narcotics. She is disabled due to her neck. She was accompanied by her 2 daughters, and they drove 5 hours from [place].    PMH/PSH/FamHx/SocHx:  Unchanged from prior visit    ROS:  Positive for neck pain  Denies myelopathic symptoms, perineal paresthesias, bowel or bladder incontinence    EXAM:  Ht 5' 6" (1.676 m)   Wt 69.9 kg (154 lb 1.6 oz)   BMI 24.87 kg/m²     My physical examination was notable for the following findings: motor intact BUE; SILT    IMAGING:  Today I independently reviewed the following images and my interpretations are as follows:    Previous AP and Lat upright C-spine films showed C3-T1 PCDF without hardware complications. C2-3 anterolisthesis and kyphosis when upright.    MRI cervical showed no significant stenosis. Well decompressed spine. C2-3 listhesis reduces when supine.    Recent DEXA scan from OSH showed lumbar T-score of -2.7.    ASSESSMENT/PLAN:  Patient has cervical spondylosis, in the setting of prior C3-T1 PCDF. I educated the patient on the importance of core/back strengthening, correct posture, bending/lifting ergonomics, and low-impact " aerobic exercises (walking, elliptical, and aquatherapy). Continue medications. I ordered CT cervical to assess fusion. I referred her to [facility] for bone health optimization. RTC in 2 weeks for CT review. Patient is a candidate for extension to C2 if she fails conservative management.    I have personally examined the patient and agree with the above plan.    Marlon Cruz MD  Orthopaedic Spine Surgeon  Department of Orthopaedic Surgery  [phone]

## 2024-04-22 ENCOUNTER — HOSPITAL ENCOUNTER (OUTPATIENT)
Dept: RADIOLOGY | Facility: HOSPITAL | Age: 64
Discharge: HOME | End: 2024-04-22
Attending: ORTHOPAEDIC SURGERY
Payer: MEDICARE

## 2024-04-22 ENCOUNTER — TELEPHONE (OUTPATIENT)
Dept: ORTHOPEDICS | Facility: CLINIC | Age: 64
End: 2024-04-22
Payer: MEDICARE

## 2024-04-22 ENCOUNTER — OFFICE VISIT (OUTPATIENT)
Dept: ORTHOPEDICS | Facility: CLINIC | Age: 64
End: 2024-04-22
Attending: ORTHOPAEDIC SURGERY
Payer: MEDICARE

## 2024-04-22 VITALS — HEIGHT: 66 IN | WEIGHT: 154.31 LBS | BODY MASS INDEX: 24.8 KG/M2

## 2024-04-22 DIAGNOSIS — Z98.1 S/P CERVICAL SPINAL FUSION: Primary | ICD-10-CM

## 2024-04-22 DIAGNOSIS — M48.02 SPINAL STENOSIS, CERVICAL REGION: ICD-10-CM

## 2024-04-22 DIAGNOSIS — M50.30 DDD (DEGENERATIVE DISC DISEASE), CERVICAL: ICD-10-CM

## 2024-04-22 DIAGNOSIS — M47.812 CERVICAL SPONDYLOSIS: ICD-10-CM

## 2024-04-22 PROCEDURE — 99213 OFFICE O/P EST LOW 20 MIN: CPT | Mod: PBBFAC,25 | Performed by: ORTHOPAEDIC SURGERY

## 2024-04-22 PROCEDURE — 72125 CT NECK SPINE W/O DYE: CPT | Mod: 26,,, | Performed by: RADIOLOGY

## 2024-04-22 PROCEDURE — 99999 PR PBB SHADOW E&M-EST. PATIENT-LVL III: CPT | Mod: PBBFAC,,, | Performed by: ORTHOPAEDIC SURGERY

## 2024-04-22 PROCEDURE — 72125 CT NECK SPINE W/O DYE: CPT | Mod: TC

## 2024-04-22 PROCEDURE — 99214 OFFICE O/P EST MOD 30 MIN: CPT | Mod: S$PBB,,, | Performed by: ORTHOPAEDIC SURGERY

## 2024-04-22 NOTE — TELEPHONE ENCOUNTER
DANNY with name and call back number    ----- Message from Scarlet Javier sent at 4/22/2024  9:59 AM CDT -----  Regarding: appointment  Pt. Came in and stated she would like her appointment to be at a letter time in the afternoon. If possible she can be reach at [phone].

## 2024-04-23 ENCOUNTER — TELEPHONE (OUTPATIENT)
Dept: ORTHOPEDICS | Facility: CLINIC | Age: 64
End: 2024-04-23
Payer: MEDICARE

## 2024-04-23 NOTE — TELEPHONE ENCOUNTER
Spoke with patient regarding her orthopedics fracture clinic appointment date and time 5-24-24 for 1:00 p.m.

## 2024-04-23 NOTE — TELEPHONE ENCOUNTER
Tried contacting patient no answer , left message on voice mail regarding her orthopedics fracture clinic appointment on 5-24-24 arrival   time is rescheduled from 9:00 a.m. to 1:00 p.m.

## 2024-04-24 NOTE — PROGRESS NOTES
"DATE: 4/24/2024  PATIENT: Ana Lilia Matos    Attending Physician: Marlon Cruz M.D.    2/10/23: C3-T1 PCDF    HISTORY:  Ana Lilia Matos is a 63 y.o. female who returns to me today for CT review. She reports that anytime she performs ADLs/use her arms she has a tightness in her neck and shoulder. She reports that she has been having "electrical impulses" going down from her neck to her left shoulder and anterior chest wall and that her hands will periodically go numb. She reports that the numbness in her hands alternates and is diffuse in nature. She reports episodes of dizziness also. She reports she has been evaluated by a neurologist with no relief of her symptoms related to her dizziness.     The patient does not smoke or endorse IVDU. She has DM (HA1C of 5.8). The patient is not on any blood thinners and does not take chronic narcotics. She is disabled due to her neck. She was accompanied by her 2 daughters, and they drove 5 hours from [place].    PMH/PSH/FamHx/SocHx:  Unchanged from prior visit    ROS:  Positive for neck pain  Denies myelopathic symptoms, perineal paresthesias, bowel or bladder incontinence    EXAM:  Ht 5' 6" (1.676 m)   Wt 70 kg (154 lb 4.8 oz)   BMI 24.90 kg/m²     My physical examination was notable for the following findings: motor intact BUE; SILT    IMAGING:  Today I independently reviewed the following images and my interpretations are as follows:    Previous AP and Lat upright C-spine films showed C3-T1 PCDF without hardware complications. C2-3 anterolisthesis and kyphosis when upright.    MRI cervical showed no significant stenosis. Well decompressed spine. C2-3 listhesis reduces when supine.    Recent DEXA scan from OSH showed lumbar T-score of -2.7.    CT cervical showed C3 screws loosening.    ASSESSMENT/PLAN:  Patient has cervical pseudoarthrosis and hardware failure, in the setting of prior C3-T1 PCDF. I educated the patient on the importance of core/back strengthening, " correct posture, bending/lifting ergonomics, and low-impact aerobic exercises (walking, elliptical, and aquatherapy). Continue medications. Patient has failed conservative management and is a candidate for extension to C2. She will think about it and let us know. RTC in PRN.    Marlon Cruz MD  Orthopaedic Spine Surgeon  Department of Orthopaedic Surgery  [phone]

## 2024-05-15 ENCOUNTER — PATIENT MESSAGE (OUTPATIENT)
Dept: ORTHOPEDICS | Facility: CLINIC | Age: 64
End: 2024-05-15
Payer: MEDICARE

## 2024-05-20 DIAGNOSIS — M50.30 DDD (DEGENERATIVE DISC DISEASE), CERVICAL: ICD-10-CM

## 2024-05-20 DIAGNOSIS — Z98.1 S/P CERVICAL SPINAL FUSION: Primary | ICD-10-CM

## 2024-05-20 NOTE — PROGRESS NOTES
Date of Surgery - 8/23/24  Patient class - Inpatient  Provider - Marlon Cruz  Procedure requested - Posterior Cervical Fusion   Levels: C2-T1 revision  Plan of Care: 3-4 midnights  Instrumentation - Depuy Symphony (posterior cervical)  Medical Necessity - not urgent  CPT codes:  25789, 46437, 14889, and 14087 and 72547  post procedural disposition - med/surg  estimated length of stay - 3-4 midnights    Patient first seen:  11/17/22  Patient's most recent visit: 4/22/24  Patient imaging: xrays, MRI.  Results: Previous AP and Lat upright C-spine films showed C3-T1 PCDF without hardware complications. C2-3 anterolisthesis and kyphosis when upright.   MRI cervical showed no significant stenosis. Well decompressed spine. C2-3 listhesis reduces when supine.   Treatment failed: the patient has tried and failed conservative treatment including medications, physical therapy and ESIs.    Medications tried: mobic and zanaflex  Activity Modification: stopped working out, changed pillows, and changed mattress  Functional impairment of ADLs: The patient's severe pain is affecting their quality of life and limiting their daily life, including working and ability to provide self care.       Pertinent physical exam findings: upper extremity weakness and paresthesias or numbness in the upper extremities    *please upload patient clinicals including all notes from Kacy Kang PA-C, Sharifa Delacruz PA-C, M. Allyson Loo NP, Dalton Burt MD, and/or Marlon Cruz MD.  Please also include any and all physical therapy and pain management notes.

## 2024-05-24 ENCOUNTER — OFFICE VISIT (OUTPATIENT)
Dept: ORTHOPEDICS | Facility: CLINIC | Age: 64
End: 2024-05-24
Payer: MEDICARE

## 2024-05-24 ENCOUNTER — LAB VISIT (OUTPATIENT)
Dept: LAB | Facility: HOSPITAL | Age: 64
End: 2024-05-24
Payer: MEDICARE

## 2024-05-24 DIAGNOSIS — M81.0 OSTEOPOROSIS, UNSPECIFIED OSTEOPOROSIS TYPE, UNSPECIFIED PATHOLOGICAL FRACTURE PRESENCE: ICD-10-CM

## 2024-05-24 DIAGNOSIS — M80.80XA PATHOLOGICAL FRACTURE DUE TO OSTEOPOROSIS, UNSPECIFIED FRACTURE SITE, UNSPECIFIED OSTEOPOROSIS TYPE, INITIAL ENCOUNTER: Primary | ICD-10-CM

## 2024-05-24 DIAGNOSIS — M80.80XA PATHOLOGICAL FRACTURE DUE TO OSTEOPOROSIS, UNSPECIFIED FRACTURE SITE, UNSPECIFIED OSTEOPOROSIS TYPE, INITIAL ENCOUNTER: ICD-10-CM

## 2024-05-24 DIAGNOSIS — M81.6 LOCALIZED OSTEOPOROSIS OF LEQUESNE: ICD-10-CM

## 2024-05-24 LAB
25(OH)D3+25(OH)D2 SERPL-MCNC: 35 NG/ML (ref 30–96)
ALBUMIN SERPL BCP-MCNC: 3.7 G/DL (ref 3.5–5.2)
ALP SERPL-CCNC: 58 U/L (ref 55–135)
ALT SERPL W/O P-5'-P-CCNC: 14 U/L (ref 10–44)
ANION GAP SERPL CALC-SCNC: 9 MMOL/L (ref 8–16)
AST SERPL-CCNC: 13 U/L (ref 10–40)
BILIRUB SERPL-MCNC: 0.3 MG/DL (ref 0.1–1)
BUN SERPL-MCNC: 5 MG/DL (ref 8–23)
CALCIUM SERPL-MCNC: 9.5 MG/DL (ref 8.7–10.5)
CHLORIDE SERPL-SCNC: 104 MMOL/L (ref 95–110)
CO2 SERPL-SCNC: 26 MMOL/L (ref 23–29)
CREAT SERPL-MCNC: 0.9 MG/DL (ref 0.5–1.4)
EST. GFR  (NO RACE VARIABLE): >60 ML/MIN/1.73 M^2
GLUCOSE SERPL-MCNC: 143 MG/DL (ref 70–110)
POTASSIUM SERPL-SCNC: 4.3 MMOL/L (ref 3.5–5.1)
PROT SERPL-MCNC: 6.8 G/DL (ref 6–8.4)
PTH-INTACT SERPL-MCNC: 58.9 PG/ML (ref 9–77)
SODIUM SERPL-SCNC: 139 MMOL/L (ref 136–145)
T4 FREE SERPL-MCNC: 0.8 NG/DL (ref 0.71–1.51)
TSH SERPL DL<=0.005 MIU/L-ACNC: 0.71 UIU/ML (ref 0.4–4)

## 2024-05-24 PROCEDURE — 84075 ASSAY ALKALINE PHOSPHATASE: CPT | Mod: 91 | Performed by: PHYSICIAN ASSISTANT

## 2024-05-24 PROCEDURE — 99213 OFFICE O/P EST LOW 20 MIN: CPT | Mod: PBBFAC

## 2024-05-24 PROCEDURE — 82306 VITAMIN D 25 HYDROXY: CPT | Performed by: PHYSICIAN ASSISTANT

## 2024-05-24 PROCEDURE — 36415 COLL VENOUS BLD VENIPUNCTURE: CPT | Performed by: PHYSICIAN ASSISTANT

## 2024-05-24 PROCEDURE — 99999 PR PBB SHADOW E&M-EST. PATIENT-LVL III: CPT | Mod: PBBFAC,,, | Performed by: PHYSICIAN ASSISTANT

## 2024-05-24 PROCEDURE — 99214 OFFICE O/P EST MOD 30 MIN: CPT | Mod: S$PBB,,, | Performed by: PHYSICIAN ASSISTANT

## 2024-05-24 PROCEDURE — 84443 ASSAY THYROID STIM HORMONE: CPT | Performed by: PHYSICIAN ASSISTANT

## 2024-05-24 PROCEDURE — 83970 ASSAY OF PARATHORMONE: CPT | Performed by: PHYSICIAN ASSISTANT

## 2024-05-24 PROCEDURE — 80053 COMPREHEN METABOLIC PANEL: CPT | Performed by: PHYSICIAN ASSISTANT

## 2024-05-24 PROCEDURE — 84439 ASSAY OF FREE THYROXINE: CPT | Performed by: PHYSICIAN ASSISTANT

## 2024-05-24 NOTE — PROGRESS NOTES
SUBJECTIVE:     Chief Complaint & History of Present Illness:  Ana Lilia Matos is a new patient 63 y.o. female who is seen here today for a bone health evaluation for osteoporosis, fracture prevention, and risk evaluation for future fractures.   she is accompanied by her   today who is helpful with her history.     she was appropriately identified and referred by Marlon Cruz MD due to concerns for compromised bone quality, and risk of future fractures.  This visit is medically necessary to identify risk, investigate and initiative treatment as appropriate to improve bone quality and strength for the reduction of secondary fractures.     her medical history, medications and fracture history will be reviewed and summarized      Review of patient's allergies indicates:   Allergen Reactions    Aspirin Palpitations         Current Outpatient Medications   Medication Sig Dispense Refill    atorvastatin (LIPITOR) 20 MG tablet Take 20 mg by mouth once daily.      DULoxetine (CYMBALTA) 20 MG capsule Take 60 mg by mouth 2 (two) times daily. SAID SHE TAKES 120 MG  IN THE AM      esomeprazole (NEXIUM) 20 MG capsule Take 20 mg by mouth once daily. TAKES 2  (20 MG) FOR A TOTAL OF 40 MG IN THE AM.      fexofenadine-pseudoephedrine  mg (ALLEGRA-D)  mg per tablet Take 1 tablet by mouth nightly.      fluticasone propionate (FLONASE) 50 mcg/actuation nasal spray 2 sprays by Each Nostril route once daily.      LORazepam (ATIVAN) 1 MG tablet Take 1 mg by mouth 3 (three) times daily.      metFORMIN (GLUCOPHAGE) 1000 MG tablet Take 1,000 mg by mouth 2 (two) times daily with meals.      OXcarbazepine (TRILEPTAL) 150 MG Tab Take 150 mg by mouth 2 (two) times daily.      tiZANidine (ZANAFLEX) 4 MG tablet TAKE 1 TABLET (4 MG TOTAL) BY MOUTH EVERY 8 (EIGHT) HOURS AS NEEDED. 60 tablet 2    traZODone (DESYREL) 100 MG tablet Take 100 mg by mouth nightly.      oxyCODONE (ROXICODONE) 5 MG immediate release tablet Take 1  "tablet (5 mg total) by mouth every 4 (four) hours as needed for Pain. 30 tablet 0     No current facility-administered medications for this visit.       Past Medical History:   Diagnosis Date    Diabetes mellitus, type 2        Past Surgical History:   Procedure Laterality Date    APPENDECTOMY      CHOLECYSTECTOMY      HYSTERECTOMY      POSTERIOR FUSION OF CERVICAL SPINE WITH LAMINECTOMY N/A 2/10/2023    Procedure: LAMINECTOMY, SPINE, CERVICAL, WITH POSTERIOR FUSION C3-T1 PLDF DEPUY  TONGS SNS: MOTORS/SSEP/EMG;  Surgeon: Marlon Cruz MD;  Location: [facility] OR [address] [place] [address];  Service: Orthopedics;  Laterality: N/A;  C4-6 Laminectomy    SPINAL FUSION         Lab Results   Component Value Date     05/24/2024    K 4.3 05/24/2024     05/24/2024    CO2 26 05/24/2024     (H) 05/24/2024    BUN 5 (L) 05/24/2024    CREATININE 0.9 05/24/2024    CALCIUM 9.5 05/24/2024    PROT 6.8 05/24/2024    ALBUMIN 3.7 05/24/2024    BILITOT 0.3 05/24/2024    ALKPHOS 58 05/24/2024    AST 13 05/24/2024    ALT 14 05/24/2024    ANIONGAP 9 05/24/2024      Lab Results   Component Value Date    WBC 9.54 02/11/2023    RBC 4.27 02/11/2023    HGB 12.7 02/11/2023    HCT 38.4 02/11/2023    MCV 90 02/11/2023    MCH 29.7 02/11/2023    MCHC 33.1 02/11/2023    RDW 12.9 02/11/2023     02/11/2023    MPV 9.3 02/11/2023    GRAN 7.7 02/11/2023    GRAN 80.8 (H) 02/11/2023    LYMPH 1.2 02/11/2023    LYMPH 12.8 (L) 02/11/2023    MONO 0.6 02/11/2023    MONO 5.8 02/11/2023    EOS 0.0 02/11/2023    BASO 0.03 02/11/2023    EOSINOPHIL 0.0 02/11/2023    BASOPHIL 0.3 02/11/2023    DIFFMETHOD Automated 02/11/2023      No results found for: "MG"   No results found for: "PHOS"   Lab Results   Component Value Date    WWIDCQIA91EL 35 05/24/2024      Lab Results   Component Value Date    PTH 58.9 05/24/2024      Lab Results   Component Value Date    TSH 0.709 05/24/2024      Lab Results   Component Value Date    FREET4 0.80 05/24/2024      Lab Results " "  Component Value Date    HGBA1C 5.3 01/27/2023    ESTIMATEDAVG 105 01/27/2023      No results found for: "FREETESTOSTE"         Vital Signs (Most Recent)  There were no vitals filed for this visit.      Today's Visit and Bone Health History   Have you had any loss of height or gotten shorter since your 20's?0Are you postmenopausal?YesPlease indicate how menopause occured.Surgical hysterectomy with ovaries removedPlease indicate at what age you became postmenopausal.35Have you ever taken any type of hormone replacement therapy?NoDo you currently smoke?NoHave you ever smoked in the past?YesIf yes, how many years?40How many alcoholic beverages do you drink per day?0How many caffeinated beverages do you drink per day?1 to 3Have you had 2 or more falls in the past 12 months?NoHow active have you been in the past 12 months?Not active ( in the house only )Did either of your parents have a hip fracture after the age of 50?NoHave you ever been diagnosed with any of the following?Diabetes MellitusGERDDo you currently have a fracture?NoHave you broken any other bones since you turned 50 or older?NoHave you ever had a bone density test or DXA scan?YesAre you currently taking or have you ever taken any of the following medications?PPI's (Nexium, Prilosec)Steroids (Prednisone, Cortisone)SSRI's (Antidepressants (Lexapro, Zoloft)Do you take Calcium?NoDo you take Vitamin D?YesIf you take Vitamin D what dose?1000 IUHave you ever been diagnosed with cancer?YesPlease indicate what type of cancer and when you were diagnosed.Invasive Mucinous CarcinomaHave you ever been treated for cancer with high beam radiation or had radioactive implants?YesIf you have been treated for cancer with high beam radiation or had radioactive implants please indicate what type.16 radiation treatments         she has medical history and medication use known to be associated with bone loss and osteoporosis to include vertebral fracture of the cervical spine " to failed fusion surgeries of the cervical spine report of for bone quality by palpation by orthopedic spine surgeon diabetes type 2 steroid use, history of cancer with chemo and radiation therapy GERD, PPIs opioid pain medication early-onset surgical induced menopause at 35.     The last DXA was performed in 12/18/2023.          T-Score Hip: -0.7     T-Score Spine: -2.7         Review of Systems:  ROS:  Constitutional: no fever or chills, positive history of nicotine abuse  Eyes: no visual changes  ENT: no nasal congestion or sore throat  Respiratory: no respiratory symptoms  Cardiovascular: no chest pain or palpitations  Gastrointestinal: no nausea or vomiting, tolerating diet  Genitourinary: no hematuria or dysuria  Integument/Breast: no rash or pruritis, positive history of breast cancer with radiation and chemotherapy  Hematologic/Lymphatic: no easy bruising or lymphadenopathy  Musculoskeletal: no arthralgias or myalgias  Neurological: no seizures or tremors  Behavioral/Psych: no auditory or visual hallucinations  Endocrine: no heat or cold intolerance, positive diabetes type 2      OBJECTIVE:     PHYSICAL EXAM:     ,                  General: no acute distress and well developed/well nourished  Behavioral/Psych: normal judgment and insight and normal mood/affect  Skin: no rash  Head/Neck: atraumatic, normocephalic, without obvious abnormality  Respiratory: normal respiratory effort  Cardiac: regular rate and rhythm  Vascular: pulses present  Abdomen: soft, non-tender  Musculoskeletal: no joint tenderness, deformity or swelling               ASSESSMENT/PLAN:     Assessment:    Osteoporosis, at high risk for future fractures, history of pathological cervical spine fracture while on bisphosphonate medications patient will go for lab work today she will have her DEXA scan repeated in [place] fax the results we will discuss with Endocrinology and discuss treatment plans fracture.    Plan:   30-45 minutes spent in  face-to-face consultation with patient and her family members today, discussing the disease management of osteoporosis, for the reduction of future fractures.  I have explained that bone strength is equal to bone quality. A bone density / DEXA scan is important to complement her care since her fracture was by definition a fragility fracture/traumatic fracture.  Over half of the encounter was spent (50%) counseling the patient on the disease of osteoporosis evidence-based and best practice treatment options available as well as recommendations for improvement of bone quality, the importance of nutritional supplements to include:  Calcium 6722-4128 mg daily in divided doses   Vitamin D3  5277-7360 units daily in divided doses.   Fall prevention and personal safety for the reduction of future fractures.    Clinicians Guidelines for the treatment of Osteoporosis 2023 as part of the National Osteoporosis Foundation were utilized as the evidence-based information provided.    Discussed pharmaceutical treatment options to include Biphosphatases, Denosumab, Abaloparatide and Teriparatide. Information to include indications for therapy, risks and benefits to treatment, and important safety information related to these treatments was provided and discussed.  Handouts were given to the patient for her review on osteoporosis and other pharmacological treatment information related to other available treatment options.    The importance of diet, impact exercise, and core strengthening with gait and balance exercise, through  both formal physical therapy programs and home exercise programs was discussed.     Bone density test recommended and ordered  Bone health labs recommended and ordered    Patient will undergo bone health lab work today we will schedule a DEXA scan in [place] at her home.  She will fax the results to [facility] we will discuss with Endocrinology in determine treatment plan.  Patient is scheduled to undergo  revision cervical fusion in July.  Surgeon has significant concerns for poor bone quality from direct palpation

## 2024-05-28 LAB — ALP BONE SERPL-MCNC: 10.4 UG/L (ref 5.6–29)

## 2024-06-13 RX ORDER — TIZANIDINE 4 MG/1
4 TABLET ORAL EVERY 8 HOURS PRN
Qty: 60 TABLET | Refills: 2 | Status: SHIPPED | OUTPATIENT
Start: 2024-06-13

## 2024-07-05 ENCOUNTER — PATIENT MESSAGE (OUTPATIENT)
Dept: ORTHOPEDICS | Facility: CLINIC | Age: 64
End: 2024-07-05
Payer: MEDICARE

## 2024-07-08 ENCOUNTER — PATIENT MESSAGE (OUTPATIENT)
Dept: ORTHOPEDICS | Facility: CLINIC | Age: 64
End: 2024-07-08
Payer: MEDICARE

## 2024-07-11 ENCOUNTER — DOCUMENTATION ONLY (OUTPATIENT)
Dept: ORTHOPEDICS | Facility: CLINIC | Age: 64
End: 2024-07-11
Payer: MEDICARE

## 2024-07-11 DIAGNOSIS — M80.80XA PATHOLOGICAL FRACTURE DUE TO OSTEOPOROSIS, UNSPECIFIED FRACTURE SITE, UNSPECIFIED OSTEOPOROSIS TYPE, INITIAL ENCOUNTER: ICD-10-CM

## 2024-07-11 DIAGNOSIS — M81.0 OSTEOPOROSIS, UNSPECIFIED OSTEOPOROSIS TYPE, UNSPECIFIED PATHOLOGICAL FRACTURE PRESENCE: Primary | ICD-10-CM

## 2024-07-11 DIAGNOSIS — M81.6 LOCALIZED OSTEOPOROSIS OF LEQUESNE: ICD-10-CM

## 2024-07-11 RX ORDER — TERIPARATIDE 250 UG/ML
20 INJECTION, SOLUTION SUBCUTANEOUS DAILY
Qty: 2.4 ML | Refills: 11 | Status: ACTIVE | OUTPATIENT
Start: 2024-07-11 | End: 2025-07-11

## 2024-07-11 NOTE — PROGRESS NOTES
DEXA scan results  06/12/2024 received from out of state facility demonstrate   T-score of - 1.7 lumbar spine  T-score of --1.1 femoral neck    FRAX score Major FX 8.9%                                Hip 0.8%

## 2024-07-15 PROBLEM — M80.80XA PATHOLOGICAL FRACTURE DUE TO OSTEOPOROSIS: Status: ACTIVE | Noted: 2024-07-15

## 2024-07-24 ENCOUNTER — PATIENT MESSAGE (OUTPATIENT)
Dept: ORTHOPEDICS | Facility: CLINIC | Age: 64
End: 2024-07-24
Payer: MEDICARE

## 2024-07-29 ENCOUNTER — PATIENT MESSAGE (OUTPATIENT)
Dept: ORTHOPEDICS | Facility: CLINIC | Age: 64
End: 2024-07-29
Payer: MEDICARE

## 2024-09-26 ENCOUNTER — PATIENT MESSAGE (OUTPATIENT)
Dept: ORTHOPEDICS | Facility: CLINIC | Age: 64
End: 2024-09-26
Payer: MEDICARE

## 2024-10-24 ENCOUNTER — TELEPHONE (OUTPATIENT)
Dept: ORTHOPEDICS | Facility: CLINIC | Age: 64
End: 2024-10-24
Payer: MEDICARE

## 2024-10-24 NOTE — TELEPHONE ENCOUNTER
Called patient and informed her we never got disability form and for her to contact [employer] and have them re-fax to [phone], pt verbalized understanding.    ----- Message from Nurse Sanchez sent at 10/23/2024  4:28 PM CDT -----  Regarding: FW: STAFF FROM [employer] REGARDING THE PT'S MEDICAL RECORDS REQUEST  Contact: ANILA    ----- Message -----  From: Niall Jarrell  Sent: 10/23/2024   4:11 PM CDT  To: Nancy Mason Staff  Subject: STAFF FROM [employer] REGARDING THE PT'S #    Staff is wanting to know if it was received??    [phone]   [employer]    [phone]

## 2024-10-28 RX ORDER — TIZANIDINE 4 MG/1
4 TABLET ORAL EVERY 8 HOURS PRN
Qty: 60 TABLET | Refills: 2 | OUTPATIENT
Start: 2024-10-28

## 2024-11-11 ENCOUNTER — TELEPHONE (OUTPATIENT)
Dept: ORTHOPEDICS | Facility: CLINIC | Age: 64
End: 2024-11-11
Payer: MEDICARE

## 2024-11-11 NOTE — TELEPHONE ENCOUNTER
Called patient to discuss surgery clearance and no answer. Left detailed msg on voicemail instructing patient to have surgical clearance faxed to [phone] and to notify our clinic with any questions or concerns.

## 2024-11-12 ENCOUNTER — PATIENT MESSAGE (OUTPATIENT)
Dept: ORTHOPEDICS | Facility: CLINIC | Age: 64
End: 2024-11-12
Payer: MEDICARE

## 2024-11-18 ENCOUNTER — PATIENT MESSAGE (OUTPATIENT)
Dept: ORTHOPEDICS | Facility: CLINIC | Age: 64
End: 2024-11-18
Payer: MEDICARE

## 2024-11-18 ENCOUNTER — ANESTHESIA EVENT (OUTPATIENT)
Dept: SURGERY | Facility: HOSPITAL | Age: 64
End: 2024-11-18
Payer: MEDICARE

## 2024-11-18 RX ORDER — MIDODRINE HYDROCHLORIDE 5 MG/1
5 TABLET ORAL 2 TIMES DAILY
COMMUNITY

## 2024-11-18 RX ORDER — BUSPIRONE HYDROCHLORIDE 10 MG/1
10 TABLET ORAL 2 TIMES DAILY
COMMUNITY

## 2024-11-18 RX ORDER — METOPROLOL TARTRATE 25 MG/1
12.5 TABLET, FILM COATED ORAL 2 TIMES DAILY
COMMUNITY
Start: 2024-02-12

## 2024-11-18 RX ORDER — ALBUTEROL SULFATE 90 UG/1
2 INHALANT RESPIRATORY (INHALATION) EVERY 6 HOURS PRN
COMMUNITY

## 2024-11-18 RX ORDER — DIAPER,BRIEF,ADULT, DISPOSABLE
1 EACH MISCELLANEOUS DAILY
COMMUNITY
Start: 2024-09-23

## 2024-11-18 RX ORDER — NITROFURANTOIN 25; 75 MG/1; MG/1
100 CAPSULE ORAL EVERY 12 HOURS
COMMUNITY

## 2024-11-18 RX ORDER — ONDANSETRON 4 MG/1
4 TABLET, ORALLY DISINTEGRATING ORAL EVERY 8 HOURS PRN
Status: ON HOLD | COMMUNITY
Start: 2024-08-21 | End: 2024-11-22

## 2024-11-18 RX ORDER — ANASTROZOLE 1 MG/1
1 TABLET ORAL DAILY
COMMUNITY
Start: 2024-02-27

## 2024-11-18 RX ORDER — ACETAMINOPHEN 500 MG
500 TABLET ORAL EVERY 6 HOURS PRN
Status: ON HOLD | COMMUNITY
Start: 2023-12-06 | End: 2024-11-22 | Stop reason: HOSPADM

## 2024-11-18 RX ORDER — PANTOPRAZOLE SODIUM 40 MG/1
40 TABLET, DELAYED RELEASE ORAL DAILY
COMMUNITY
Start: 2024-11-15

## 2024-11-18 RX ORDER — PNV NO.95/FERROUS FUM/FOLIC AC 28MG-0.8MG
1 TABLET ORAL DAILY
COMMUNITY
Start: 2024-06-24

## 2024-11-18 NOTE — ANESTHESIA PREPROCEDURE EVALUATION
11/18/2024  Ana Lilia Matos is a 63 y.o., female  w/ a hx of C4-7 ACDF 13 years ago presents for evaluation of neck and bilateral arm pain       Pre-op Assessment    I have reviewed the Patient Summary Reports.     I have reviewed the Nursing Notes. I have reviewed the NPO Status.   I have reviewed the Medications.     Review of Systems  Anesthesia Hx:  No problems with previous Anesthesia   History of prior surgery of interest to airway management or planning:          Denies Family Hx of Anesthesia complications.   Personal Hx of Anesthesia complications, Post-Operative Nausea/Vomiting, with every anesthetic, treatment not known                    Hematology/Oncology:  Hematology Normal   Oncology Normal                                   EENT/Dental:  EENT/Dental Normal           Cardiovascular:  Cardiovascular Normal                                              Pulmonary:  Pulmonary Normal                       Renal/:  Renal/ Normal                 Hepatic/GI:  Hepatic/GI Normal                    Musculoskeletal:         Spine Disorders: cervical Disc disease and Chronic Pain           Neurological:  Neurology Normal                                      Endocrine:  Diabetes, type 2           Psych:  Psychiatric Normal                    Physical Exam  General: Well nourished, Cooperative, Oriented and Alert    Airway:  Mallampati: II   Mouth Opening: Normal  TM Distance: Normal  Tongue: Normal    Dental:  Dentures    Chest/Lungs:  Clear to auscultation, Normal Respiratory Rate    Heart:  Rate: Normal  Rhythm: Regular Rhythm        Anesthesia Plan  Type of Anesthesia, risks & benefits discussed:    Anesthesia Type: Gen ETT  Intra-op Monitoring Plan: Standard ASA Monitors and Art Line  Post Op Pain Control Plan: multimodal analgesia and IV/PO Opioids PRN  Induction:  IV  Airway Plan: Video,  Post-Induction  Informed Consent: Informed consent signed with the Patient and all parties understand the risks and agree with anesthesia plan.  All questions answered.   ASA Score: 3  Day of Surgery Review of History & Physical: H&P Update referred to the surgeon/provider.    Ready For Surgery From Anesthesia Perspective.     .

## 2024-11-18 NOTE — PRE-PROCEDURE INSTRUCTIONS
PreOp Instructions given:   - Verbal medication information (what to hold and what to take)   - NPO guidelines   Patients should stop full meals at midnight, but they can consume clear liquids up to 2 hours prior to scheduled Surgery time.  Clear liquids include Gatorade, water, Clear liquids do NOT include anything with pulp or food particles (such as chicken broth, ice cream, yogurt, Jello, etc.)   - Arrival place directions given; time to be given the day before procedure by the   Surgeon's Office DOSC  - Bathing with antibacterial soap   - Don't wear any jewelry or bring any valuables AM of surgery   - No makeup or moisturizer to face   - No perfume/cologne, powder, lotions or aftershave   Pt. verbalized understanding.   Pt reports h/o PONV  Patient does not know arrival time.  Explained that this information comes from the surgeon's office and if they haven't heard from them by 2 or 3 pm to call the office.  Patient stated an understanding.     11/18/24 - appt r/o UTI.

## 2024-11-19 ENCOUNTER — PATIENT MESSAGE (OUTPATIENT)
Dept: ORTHOPEDICS | Facility: CLINIC | Age: 64
End: 2024-11-19
Payer: MEDICARE

## 2024-11-19 ENCOUNTER — TELEPHONE (OUTPATIENT)
Dept: ORTHOPEDICS | Facility: CLINIC | Age: 64
End: 2024-11-19
Payer: MEDICARE

## 2024-11-19 PROBLEM — Z98.1 S/P CERVICAL SPINAL FUSION: Status: ACTIVE | Noted: 2024-11-19

## 2024-11-19 NOTE — HPI
"Ana Lilia Matos is a 63 y.o. female who returns to me today for CT review. She reports that anytime she performs ADLs/use her arms she has a tightness in her neck and shoulder. She reports that she has been having "electrical impulses" going down from her neck to her left shoulder and anterior chest wall and that her hands will periodically go numb. She reports that the numbness in her hands alternates and is diffuse in nature. She reports episodes of dizziness also. She reports she has been evaluated by a neurologist with no relief of her symptoms related to her dizziness.      The patient does not smoke or endorse IVDU. She has DM (HA1C of 5.8). The patient is not on any blood thinners and does not take chronic narcotics. She is disabled due to her neck. She was accompanied by her 2 daughters, and they drove 5 hours from [place].     "

## 2024-11-19 NOTE — DISCHARGE INSTRUCTIONS
DR. DOREEN HORNER'S POSTOPERATIVE INSTRUCTIONS -   POSTERIOR CERVICAL LAMINECTOMY AND FUSION       Antibiotics: You do not need additional antibiotics at home.    NSAIDs: Please refrain from taking ibuprofen (Advil), naproxen (Aleve), and other non steroidal anti-inflammatory medications other than the Celebrex that will be prescribed to you after surgery.    Wound Care: You may remove your dressing and shower 7 days after surgery. Until then please keep your wound clean and dry. Sponge baths are acceptable. Do not go in a pool or hot tub until seen in clinic. Please leave the small steri-strips covering your wound in place until they fall off naturally (2 weeks). You may notice clear suture ends hanging from the sides of your incision after the steri-strips are removed, it is ok to clip these with scissors.    Cervical Collar: If given a collar after surgery you should wear it at all times. The only times it may be removed are for sleeping, eating and showering.    Pain: We will use a multimodal approach for pain management after your surgery.  You will be given a prescription for pain medicine when you are discharged from the hospital.  You will also be given prescriptions for Robaxin (a muscle relaxer), Gabapentin, Celebrex and Tylenol.  Please note: you will only be given ONE prescription for narcotics when you are discharged from the hospital.  This medication is for breakthrough pain only. This medication will not be refilled.  The other medications given to you may be refilled if needed.      Difficulty Breathing: This is uncommon after surgery and may represent dangerous swelling in your neck. If you experience difficulty breathing please call 911 immediately.    Infection: Signs of infection include increasing wound drainage and redness around the wound, as well as a temperature over 101.5 degrees. It is unnecessary to take your temperature on a routine basis. Please call the above number if you are concerned  about an infection.    Driving and Work: It is ok to return to driving and work as long as you are not taking narcotic pain medications or wearing your cervical collar. Please do not lift over 5 pounds or participate in exercise or sports until cleared by Dr. Cruz.    Deep Venous Thrombosis (Blood Clots): Symptoms include swelling in the legs and shortness of breath. Please call the office or proceed to the nearest emergency room if you have any of these symptoms.    Physical Therapy: The best physical therapy immediately after surgery is walking. Please try to walk as much as possible.    Follow-up: You will be scheduled for a follow-up appointment in 4 weeks with either Dr. Cruz or his physician assistant, Sharifa Delacruz PA-C.    Questions: During business hours please call [phone] for routine questions. For after hours questions please call [phone] and ask to speak with the Orthopaedic resident on call.    Disability: If you submitted short term disability paperwork for us to complete and would like to check the status, please call the Disability Department at [phone].  You may fax any necessary paperwork to [phone].

## 2024-11-19 NOTE — HOSPITAL COURSE
On 11/19/2024, the patient arrived to the [facility] OR for proper pre-operative management.  Upon completion of pre-operative preparation, the patient was taken back to the operative theatre. C2-T1 revision PCF was performed without complication and the patient was transported to the post anesthesia care unit in stable condition.  After appropriate recovery from the anaesthetic agents used during the surgery, the patient was then transported to the hospital inpatient floor.  The interim of the hospital stay from arrival on the floor up to discharge has been uncomplicated. The patient has tolerated regular diet.  The patient's pain has been controlled using a multimodal approach. Currently, the patient's pain is well controlled on an oral regimen.  The patient has been voiding without difficulty.  The patient began participation in physical therapy after surgery and has progressed throughout the entire hospital stay.  She had a fall during Xray session causing neck pain, HA and dizziness. Symptoms were monitored after the fall and she has progressed appropriately.  Currently, the patient's progress is sufficient to allow the them to be discharged to home safely.  The patient agrees with this assessment and desires a discharge today.

## 2024-11-19 NOTE — TELEPHONE ENCOUNTER
Spoke to pt, informed her arrival time for surgery tomorrow is 6:00am at [facility], 2nd floor, [address]. No food or drink after midnight (may have water up until 4am). Reminded pt to shower with Hibiclens antibacterial soap tonight and tomorrow. Pt pleased and verbalized understanding.

## 2024-11-20 ENCOUNTER — HOSPITAL ENCOUNTER (INPATIENT)
Facility: HOSPITAL | Age: 64
LOS: 4 days | Discharge: HOME | DRG: 472 | End: 2024-11-24
Attending: ORTHOPAEDIC SURGERY | Admitting: ORTHOPAEDIC SURGERY
Payer: MEDICARE

## 2024-11-20 ENCOUNTER — ANESTHESIA (OUTPATIENT)
Dept: SURGERY | Facility: HOSPITAL | Age: 64
End: 2024-11-20
Payer: MEDICARE

## 2024-11-20 DIAGNOSIS — Z98.1 S/P CERVICAL SPINAL FUSION: Primary | ICD-10-CM

## 2024-11-20 DIAGNOSIS — G95.9 CERVICAL MYELOPATHY: ICD-10-CM

## 2024-11-20 DIAGNOSIS — Z98.1 S/P SPINAL FUSION: ICD-10-CM

## 2024-11-20 DIAGNOSIS — Z98.1 S/P CERVICAL SPINAL FUSION: ICD-10-CM

## 2024-11-20 LAB
ABO + RH BLD: NORMAL
BLD GP AB SCN CELLS X3 SERPL QL: NORMAL
POCT GLUCOSE: 136 MG/DL (ref 70–110)
POCT GLUCOSE: 199 MG/DL (ref 70–110)
POCT GLUCOSE: 213 MG/DL (ref 70–110)
SPECIMEN OUTDATE: NORMAL

## 2024-11-20 PROCEDURE — 20930 SP BONE ALGRFT MORSEL ADD-ON: CPT | Mod: ,,, | Performed by: ORTHOPAEDIC SURGERY

## 2024-11-20 PROCEDURE — 63600175 PHARM REV CODE 636 W HCPCS

## 2024-11-20 PROCEDURE — 36000711: Performed by: ORTHOPAEDIC SURGERY

## 2024-11-20 PROCEDURE — 0KXF0Z2 TRANSFER RIGHT TRUNK MUSCLE WITH SKIN AND SUBCUTANEOUS TISSUE, OPEN APPROACH: ICD-10-PCS | Performed by: STUDENT IN AN ORGANIZED HEALTH CARE EDUCATION/TRAINING PROGRAM

## 2024-11-20 PROCEDURE — 71000015 HC POSTOP RECOV 1ST HR: Performed by: ORTHOPAEDIC SURGERY

## 2024-11-20 PROCEDURE — C1713 ANCHOR/SCREW BN/BN,TIS/BN: HCPCS | Performed by: ORTHOPAEDIC SURGERY

## 2024-11-20 PROCEDURE — 0RG40K1 FUSION OF CERVICOTHORACIC VERTEBRAL JOINT WITH NONAUTOLOGOUS TISSUE SUBSTITUTE, POSTERIOR APPROACH, POSTERIOR COLUMN, OPEN APPROACH: ICD-10-PCS | Performed by: ORTHOPAEDIC SURGERY

## 2024-11-20 PROCEDURE — 27201423 OPTIME MED/SURG SUP & DEVICES STERILE SUPPLY: Performed by: ORTHOPAEDIC SURGERY

## 2024-11-20 PROCEDURE — 25000003 PHARM REV CODE 250

## 2024-11-20 PROCEDURE — 71000039 HC RECOVERY, EACH ADD'L HOUR: Performed by: ORTHOPAEDIC SURGERY

## 2024-11-20 PROCEDURE — 25000003 PHARM REV CODE 250: Performed by: STUDENT IN AN ORGANIZED HEALTH CARE EDUCATION/TRAINING PROGRAM

## 2024-11-20 PROCEDURE — 22614 ARTHRD PST TQ 1NTRSPC EA ADD: CPT | Mod: 62,,, | Performed by: STUDENT IN AN ORGANIZED HEALTH CARE EDUCATION/TRAINING PROGRAM

## 2024-11-20 PROCEDURE — 37000008 HC ANESTHESIA 1ST 15 MINUTES: Performed by: ORTHOPAEDIC SURGERY

## 2024-11-20 PROCEDURE — 61783 SCAN PROC SPINAL: CPT | Mod: ,,, | Performed by: ORTHOPAEDIC SURGERY

## 2024-11-20 PROCEDURE — 27000221 HC OXYGEN, UP TO 24 HOURS

## 2024-11-20 PROCEDURE — 22842 INSERT SPINE FIXATION DEVICE: CPT | Mod: ,,, | Performed by: ORTHOPAEDIC SURGERY

## 2024-11-20 PROCEDURE — 22600 ARTHRD PST TQ 1NTRSPC CRV: CPT | Mod: 62,,, | Performed by: STUDENT IN AN ORGANIZED HEALTH CARE EDUCATION/TRAINING PROGRAM

## 2024-11-20 PROCEDURE — 11000001 HC ACUTE MED/SURG PRIVATE ROOM

## 2024-11-20 PROCEDURE — 22614 ARTHRD PST TQ 1NTRSPC EA ADD: CPT | Mod: 62,,, | Performed by: ORTHOPAEDIC SURGERY

## 2024-11-20 PROCEDURE — 99900035 HC TECH TIME PER 15 MIN (STAT)

## 2024-11-20 PROCEDURE — 22600 ARTHRD PST TQ 1NTRSPC CRV: CPT | Mod: 22,62,, | Performed by: ORTHOPAEDIC SURGERY

## 2024-11-20 PROCEDURE — 94761 N-INVAS EAR/PLS OXIMETRY MLT: CPT

## 2024-11-20 PROCEDURE — 22842 INSERT SPINE FIXATION DEVICE: CPT | Mod: 82,,, | Performed by: STUDENT IN AN ORGANIZED HEALTH CARE EDUCATION/TRAINING PROGRAM

## 2024-11-20 PROCEDURE — 36000710: Performed by: ORTHOPAEDIC SURGERY

## 2024-11-20 PROCEDURE — 71000033 HC RECOVERY, INTIAL HOUR: Performed by: ORTHOPAEDIC SURGERY

## 2024-11-20 PROCEDURE — 37000009 HC ANESTHESIA EA ADD 15 MINS: Performed by: ORTHOPAEDIC SURGERY

## 2024-11-20 PROCEDURE — 0RG20K1 FUSION OF 2 OR MORE CERVICAL VERTEBRAL JOINTS WITH NONAUTOLOGOUS TISSUE SUBSTITUTE, POSTERIOR APPROACH, POSTERIOR COLUMN, OPEN APPROACH: ICD-10-PCS | Performed by: ORTHOPAEDIC SURGERY

## 2024-11-20 PROCEDURE — 0PP304Z REMOVAL OF INTERNAL FIXATION DEVICE FROM CERVICAL VERTEBRA, OPEN APPROACH: ICD-10-PCS | Performed by: ORTHOPAEDIC SURGERY

## 2024-11-20 PROCEDURE — 15734 MUSCLE-SKIN GRAFT TRUNK: CPT | Mod: ,,, | Performed by: ORTHOPAEDIC SURGERY

## 2024-11-20 PROCEDURE — 82962 GLUCOSE BLOOD TEST: CPT | Performed by: ORTHOPAEDIC SURGERY

## 2024-11-20 PROCEDURE — 63600175 PHARM REV CODE 636 W HCPCS: Performed by: SURGERY

## 2024-11-20 PROCEDURE — 63600175 PHARM REV CODE 636 W HCPCS: Performed by: STUDENT IN AN ORGANIZED HEALTH CARE EDUCATION/TRAINING PROGRAM

## 2024-11-20 PROCEDURE — 63600175 PHARM REV CODE 636 W HCPCS: Performed by: ORTHOPAEDIC SURGERY

## 2024-11-20 PROCEDURE — 0KXG0Z2 TRANSFER LEFT TRUNK MUSCLE WITH SKIN AND SUBCUTANEOUS TISSUE, OPEN APPROACH: ICD-10-PCS | Performed by: STUDENT IN AN ORGANIZED HEALTH CARE EDUCATION/TRAINING PROGRAM

## 2024-11-20 PROCEDURE — 25000003 PHARM REV CODE 250: Performed by: SURGERY

## 2024-11-20 PROCEDURE — 86900 BLOOD TYPING SEROLOGIC ABO: CPT | Performed by: STUDENT IN AN ORGANIZED HEALTH CARE EDUCATION/TRAINING PROGRAM

## 2024-11-20 PROCEDURE — 27800903 OPTIME MED/SURG SUP & DEVICES OTHER IMPLANTS: Performed by: ORTHOPAEDIC SURGERY

## 2024-11-20 PROCEDURE — 71000016 HC POSTOP RECOV ADDL HR: Performed by: ORTHOPAEDIC SURGERY

## 2024-11-20 DEVICE — ROD SYMPHONY PRE-CUT 3.5X85MM: Type: IMPLANTABLE DEVICE | Site: SPINE CERVICAL | Status: FUNCTIONAL

## 2024-11-20 DEVICE — IMPLANTABLE DEVICE: Type: IMPLANTABLE DEVICE | Site: SPINE CERVICAL | Status: FUNCTIONAL

## 2024-11-20 DEVICE — SCREW SYMPHONY PA 3.5X16MM: Type: IMPLANTABLE DEVICE | Site: SPINE CERVICAL | Status: FUNCTIONAL

## 2024-11-20 DEVICE — KIT BONE GRFT BMP XS: Type: IMPLANTABLE DEVICE | Site: SPINE CERVICAL | Status: FUNCTIONAL

## 2024-11-20 DEVICE — IMPLANTABLE DEVICE: Type: IMPLANTABLE DEVICE | Site: POSTERIOR CERVICAL | Status: FUNCTIONAL

## 2024-11-20 DEVICE — SET SYMPHONY SCREW NS: Type: IMPLANTABLE DEVICE | Site: SPINE CERVICAL | Status: FUNCTIONAL

## 2024-11-20 RX ORDER — HALOPERIDOL 5 MG/ML
0.5 INJECTION INTRAMUSCULAR EVERY 10 MIN PRN
Status: DISCONTINUED | OUTPATIENT
Start: 2024-11-20 | End: 2024-11-20 | Stop reason: HOSPADM

## 2024-11-20 RX ORDER — METOPROLOL TARTRATE 25 MG/1
12.5 TABLET ORAL 2 TIMES DAILY
Status: DISCONTINUED | OUTPATIENT
Start: 2024-11-20 | End: 2024-11-24 | Stop reason: HOSPADM

## 2024-11-20 RX ORDER — MIDODRINE HYDROCHLORIDE 5 MG/1
5 TABLET ORAL 2 TIMES DAILY
Status: DISCONTINUED | OUTPATIENT
Start: 2024-11-20 | End: 2024-11-24 | Stop reason: HOSPADM

## 2024-11-20 RX ORDER — MUPIROCIN 20 MG/G
OINTMENT TOPICAL
Status: DISCONTINUED | OUTPATIENT
Start: 2024-11-20 | End: 2024-11-20 | Stop reason: HOSPADM

## 2024-11-20 RX ORDER — OXYCODONE HYDROCHLORIDE 5 MG/1
5 TABLET ORAL EVERY 8 HOURS PRN
Qty: 25 TABLET | Refills: 0 | Status: SHIPPED | OUTPATIENT
Start: 2024-11-20

## 2024-11-20 RX ORDER — GABAPENTIN 300 MG/1
300 CAPSULE ORAL 3 TIMES DAILY
Qty: 90 CAPSULE | Refills: 0 | Status: SHIPPED | OUTPATIENT
Start: 2024-11-20 | End: 2024-12-24

## 2024-11-20 RX ORDER — HYDROMORPHONE HYDROCHLORIDE 0.5 MG/.5ML
0.5 INJECTION INTRAMUSCULAR; INTRAVENOUS; SUBCUTANEOUS
Status: DISCONTINUED | OUTPATIENT
Start: 2024-11-20 | End: 2024-11-24 | Stop reason: HOSPADM

## 2024-11-20 RX ORDER — METHOCARBAMOL 750 MG/1
750 TABLET, FILM COATED ORAL 4 TIMES DAILY
Qty: 40 TABLET | Refills: 0 | Status: SHIPPED | OUTPATIENT
Start: 2024-11-20 | End: 2024-12-20

## 2024-11-20 RX ORDER — DEXAMETHASONE SODIUM PHOSPHATE 4 MG/ML
INJECTION, SOLUTION INTRA-ARTICULAR; INTRALESIONAL; INTRAMUSCULAR; INTRAVENOUS; SOFT TISSUE
Status: DISCONTINUED | OUTPATIENT
Start: 2024-11-20 | End: 2024-11-20

## 2024-11-20 RX ORDER — INSULIN ASPART 100 [IU]/ML
0-5 INJECTION, SOLUTION INTRAVENOUS; SUBCUTANEOUS
Status: DISCONTINUED | OUTPATIENT
Start: 2024-11-20 | End: 2024-11-24 | Stop reason: HOSPADM

## 2024-11-20 RX ORDER — KETAMINE HCL IN 0.9 % NACL 50 MG/5 ML
SYRINGE (ML) INTRAVENOUS
Status: DISCONTINUED | OUTPATIENT
Start: 2024-11-20 | End: 2024-11-20

## 2024-11-20 RX ORDER — HYDROMORPHONE HYDROCHLORIDE 1 MG/ML
0.2 INJECTION, SOLUTION INTRAMUSCULAR; INTRAVENOUS; SUBCUTANEOUS EVERY 5 MIN PRN
Status: DISCONTINUED | OUTPATIENT
Start: 2024-11-20 | End: 2024-11-20 | Stop reason: HOSPADM

## 2024-11-20 RX ORDER — MIDAZOLAM HYDROCHLORIDE 1 MG/ML
INJECTION INTRAMUSCULAR; INTRAVENOUS
Status: DISCONTINUED | OUTPATIENT
Start: 2024-11-20 | End: 2024-11-20

## 2024-11-20 RX ORDER — FENTANYL CITRATE 50 UG/ML
INJECTION, SOLUTION INTRAMUSCULAR; INTRAVENOUS
Status: DISCONTINUED | OUTPATIENT
Start: 2024-11-20 | End: 2024-11-20

## 2024-11-20 RX ORDER — LORAZEPAM 0.5 MG/1
1 TABLET ORAL 3 TIMES DAILY
Status: DISCONTINUED | OUTPATIENT
Start: 2024-11-20 | End: 2024-11-20

## 2024-11-20 RX ORDER — HYDRALAZINE HYDROCHLORIDE 20 MG/ML
INJECTION INTRAMUSCULAR; INTRAVENOUS
Status: DISPENSED
Start: 2024-11-20 | End: 2024-11-21

## 2024-11-20 RX ORDER — GLUCAGON 1 MG
1 KIT INJECTION
Status: DISCONTINUED | OUTPATIENT
Start: 2024-11-20 | End: 2024-11-20 | Stop reason: HOSPADM

## 2024-11-20 RX ORDER — BISACODYL 10 MG/1
10 SUPPOSITORY RECTAL DAILY PRN
Status: DISCONTINUED | OUTPATIENT
Start: 2024-11-20 | End: 2024-11-24 | Stop reason: HOSPADM

## 2024-11-20 RX ORDER — ONDANSETRON 4 MG/1
8 TABLET, ORALLY DISINTEGRATING ORAL EVERY 8 HOURS PRN
Qty: 10 TABLET | Refills: 0 | Status: SHIPPED | OUTPATIENT
Start: 2024-11-20

## 2024-11-20 RX ORDER — SODIUM CHLORIDE 9 MG/ML
INJECTION, SOLUTION INTRAVENOUS CONTINUOUS
Status: DISCONTINUED | OUTPATIENT
Start: 2024-11-20 | End: 2024-11-24 | Stop reason: HOSPADM

## 2024-11-20 RX ORDER — LORAZEPAM 1 MG/1
1 TABLET ORAL 2 TIMES DAILY
Status: DISCONTINUED | OUTPATIENT
Start: 2024-11-20 | End: 2024-11-24 | Stop reason: HOSPADM

## 2024-11-20 RX ORDER — ATORVASTATIN CALCIUM 20 MG/1
20 TABLET, FILM COATED ORAL DAILY
Status: DISCONTINUED | OUTPATIENT
Start: 2024-11-21 | End: 2024-11-24 | Stop reason: HOSPADM

## 2024-11-20 RX ORDER — LIDOCAINE HYDROCHLORIDE 20 MG/ML
INJECTION, SOLUTION EPIDURAL; INFILTRATION; INTRACAUDAL; PERINEURAL
Status: DISCONTINUED | OUTPATIENT
Start: 2024-11-20 | End: 2024-11-20

## 2024-11-20 RX ORDER — ACETAMINOPHEN 500 MG
1000 TABLET ORAL EVERY 8 HOURS
Status: COMPLETED | OUTPATIENT
Start: 2024-11-20 | End: 2024-11-21

## 2024-11-20 RX ORDER — CEFAZOLIN 2 G/1
2 INJECTION, POWDER, FOR SOLUTION INTRAMUSCULAR; INTRAVENOUS
Status: COMPLETED | OUTPATIENT
Start: 2024-11-20 | End: 2024-11-21

## 2024-11-20 RX ORDER — FENTANYL CITRATE 50 UG/ML
25 INJECTION, SOLUTION INTRAMUSCULAR; INTRAVENOUS EVERY 5 MIN PRN
Status: DISCONTINUED | OUTPATIENT
Start: 2024-11-20 | End: 2024-11-20 | Stop reason: HOSPADM

## 2024-11-20 RX ORDER — BUSPIRONE HYDROCHLORIDE 10 MG/1
10 TABLET ORAL 2 TIMES DAILY
Status: DISCONTINUED | OUTPATIENT
Start: 2024-11-20 | End: 2024-11-24 | Stop reason: HOSPADM

## 2024-11-20 RX ORDER — FAMOTIDINE 20 MG/1
20 TABLET, FILM COATED ORAL 2 TIMES DAILY
Status: DISCONTINUED | OUTPATIENT
Start: 2024-11-20 | End: 2024-11-24 | Stop reason: HOSPADM

## 2024-11-20 RX ORDER — IBUPROFEN 200 MG
16 TABLET ORAL
Status: DISCONTINUED | OUTPATIENT
Start: 2024-11-20 | End: 2024-11-24 | Stop reason: HOSPADM

## 2024-11-20 RX ORDER — GLUCAGON 1 MG
1 KIT INJECTION
Status: DISCONTINUED | OUTPATIENT
Start: 2024-11-20 | End: 2024-11-24 | Stop reason: HOSPADM

## 2024-11-20 RX ORDER — DULOXETIN HYDROCHLORIDE 60 MG/1
120 CAPSULE, DELAYED RELEASE ORAL DAILY
Status: DISCONTINUED | OUTPATIENT
Start: 2024-11-21 | End: 2024-11-24 | Stop reason: HOSPADM

## 2024-11-20 RX ORDER — OXYCODONE HYDROCHLORIDE 5 MG/1
5 TABLET ORAL EVERY 4 HOURS PRN
Status: DISCONTINUED | OUTPATIENT
Start: 2024-11-20 | End: 2024-11-24 | Stop reason: HOSPADM

## 2024-11-20 RX ORDER — NITROFURANTOIN 25; 75 MG/1; MG/1
100 CAPSULE ORAL EVERY 12 HOURS
Status: DISCONTINUED | OUTPATIENT
Start: 2024-11-20 | End: 2024-11-24 | Stop reason: HOSPADM

## 2024-11-20 RX ORDER — PHENYLEPHRINE HYDROCHLORIDE 10 MG/ML
INJECTION INTRAVENOUS
Status: DISCONTINUED | OUTPATIENT
Start: 2024-11-20 | End: 2024-11-20

## 2024-11-20 RX ORDER — GABAPENTIN 300 MG/1
300 CAPSULE ORAL 3 TIMES DAILY
Status: DISCONTINUED | OUTPATIENT
Start: 2024-11-20 | End: 2024-11-24 | Stop reason: HOSPADM

## 2024-11-20 RX ORDER — METHOCARBAMOL 500 MG/1
1000 TABLET, FILM COATED ORAL 3 TIMES DAILY
Status: DISCONTINUED | OUTPATIENT
Start: 2024-11-21 | End: 2024-11-24 | Stop reason: HOSPADM

## 2024-11-20 RX ORDER — LIDOCAINE HYDROCHLORIDE 10 MG/ML
1 INJECTION, SOLUTION EPIDURAL; INFILTRATION; INTRACAUDAL; PERINEURAL ONCE
Status: COMPLETED | OUTPATIENT
Start: 2024-11-20 | End: 2024-11-20

## 2024-11-20 RX ORDER — ONDANSETRON HYDROCHLORIDE 2 MG/ML
INJECTION, SOLUTION INTRAVENOUS
Status: DISCONTINUED | OUTPATIENT
Start: 2024-11-20 | End: 2024-11-20

## 2024-11-20 RX ORDER — LABETALOL HCL 20 MG/4 ML
10 SYRINGE (ML) INTRAVENOUS ONCE
Status: COMPLETED | OUTPATIENT
Start: 2024-11-20 | End: 2024-11-20

## 2024-11-20 RX ORDER — DOCUSATE SODIUM 100 MG/1
100 CAPSULE, LIQUID FILLED ORAL 2 TIMES DAILY
Status: DISCONTINUED | OUTPATIENT
Start: 2024-11-20 | End: 2024-11-24 | Stop reason: HOSPADM

## 2024-11-20 RX ORDER — CELECOXIB 100 MG/1
100 CAPSULE ORAL 2 TIMES DAILY
Status: DISCONTINUED | OUTPATIENT
Start: 2024-11-21 | End: 2024-11-24 | Stop reason: HOSPADM

## 2024-11-20 RX ORDER — ACETAMINOPHEN 325 MG/1
650 TABLET ORAL EVERY 6 HOURS PRN
Qty: 40 TABLET | Refills: 0 | Status: SHIPPED | OUTPATIENT
Start: 2024-11-20

## 2024-11-20 RX ORDER — FAMOTIDINE 20 MG/1
20 TABLET, FILM COATED ORAL 2 TIMES DAILY
Qty: 60 TABLET | Refills: 0 | Status: SHIPPED | OUTPATIENT
Start: 2024-11-20 | End: 2024-12-24

## 2024-11-20 RX ORDER — PROMETHAZINE HYDROCHLORIDE 12.5 MG/1
25 TABLET ORAL EVERY 6 HOURS PRN
Status: DISCONTINUED | OUTPATIENT
Start: 2024-11-20 | End: 2024-11-24 | Stop reason: HOSPADM

## 2024-11-20 RX ORDER — DEXMEDETOMIDINE HYDROCHLORIDE 100 UG/ML
INJECTION, SOLUTION INTRAVENOUS
Status: DISCONTINUED | OUTPATIENT
Start: 2024-11-20 | End: 2024-11-20

## 2024-11-20 RX ORDER — TRAZODONE HYDROCHLORIDE 100 MG/1
100 TABLET ORAL NIGHTLY
Status: DISCONTINUED | OUTPATIENT
Start: 2024-11-20 | End: 2024-11-24 | Stop reason: HOSPADM

## 2024-11-20 RX ORDER — PROPOFOL 10 MG/ML
VIAL (ML) INTRAVENOUS
Status: DISCONTINUED | OUTPATIENT
Start: 2024-11-20 | End: 2024-11-20

## 2024-11-20 RX ORDER — VANCOMYCIN HYDROCHLORIDE 1 G/20ML
INJECTION, POWDER, LYOPHILIZED, FOR SOLUTION INTRAVENOUS
Status: DISCONTINUED | OUTPATIENT
Start: 2024-11-20 | End: 2024-11-20 | Stop reason: HOSPADM

## 2024-11-20 RX ORDER — SUCCINYLCHOLINE CHLORIDE 20 MG/ML
INJECTION INTRAMUSCULAR; INTRAVENOUS
Status: DISCONTINUED | OUTPATIENT
Start: 2024-11-20 | End: 2024-11-20

## 2024-11-20 RX ORDER — CEFAZOLIN 2 G/1
2 INJECTION, POWDER, FOR SOLUTION INTRAMUSCULAR; INTRAVENOUS
Status: COMPLETED | OUTPATIENT
Start: 2024-11-20 | End: 2024-11-20

## 2024-11-20 RX ORDER — HYDRALAZINE HYDROCHLORIDE 20 MG/ML
10 INJECTION INTRAMUSCULAR; INTRAVENOUS ONCE
Status: COMPLETED | OUTPATIENT
Start: 2024-11-20 | End: 2024-11-20

## 2024-11-20 RX ORDER — OXYCODONE HYDROCHLORIDE 10 MG/1
10 TABLET ORAL EVERY 4 HOURS PRN
Status: DISCONTINUED | OUTPATIENT
Start: 2024-11-20 | End: 2024-11-24 | Stop reason: HOSPADM

## 2024-11-20 RX ORDER — ONDANSETRON 8 MG/1
8 TABLET, ORALLY DISINTEGRATING ORAL EVERY 8 HOURS PRN
Status: DISCONTINUED | OUTPATIENT
Start: 2024-11-20 | End: 2024-11-24 | Stop reason: HOSPADM

## 2024-11-20 RX ORDER — TALC
6 POWDER (GRAM) TOPICAL NIGHTLY PRN
Status: DISCONTINUED | OUTPATIENT
Start: 2024-11-20 | End: 2024-11-24 | Stop reason: HOSPADM

## 2024-11-20 RX ORDER — AMOXICILLIN 250 MG
1 CAPSULE ORAL 2 TIMES DAILY
Qty: 28 TABLET | Refills: 0 | Status: SHIPPED | OUTPATIENT
Start: 2024-11-20 | End: 2024-12-08

## 2024-11-20 RX ORDER — OXCARBAZEPINE 150 MG/1
150 TABLET, FILM COATED ORAL 2 TIMES DAILY
Status: DISCONTINUED | OUTPATIENT
Start: 2024-11-20 | End: 2024-11-24 | Stop reason: HOSPADM

## 2024-11-20 RX ORDER — POLYETHYLENE GLYCOL 3350 17 G/17G
17 POWDER, FOR SOLUTION ORAL DAILY
Status: DISCONTINUED | OUTPATIENT
Start: 2024-11-20 | End: 2024-11-24 | Stop reason: HOSPADM

## 2024-11-20 RX ORDER — SODIUM CHLORIDE 0.9 % (FLUSH) 0.9 %
10 SYRINGE (ML) INJECTION
Status: DISCONTINUED | OUTPATIENT
Start: 2024-11-20 | End: 2024-11-20 | Stop reason: HOSPADM

## 2024-11-20 RX ORDER — SCOLOPAMINE TRANSDERMAL SYSTEM 1 MG/1
1 PATCH, EXTENDED RELEASE TRANSDERMAL
Status: DISCONTINUED | OUTPATIENT
Start: 2024-11-20 | End: 2024-11-24 | Stop reason: HOSPADM

## 2024-11-20 RX ORDER — EPHEDRINE SULFATE 50 MG/ML
INJECTION, SOLUTION INTRAVENOUS
Status: DISCONTINUED | OUTPATIENT
Start: 2024-11-20 | End: 2024-11-20

## 2024-11-20 RX ORDER — ROCURONIUM BROMIDE 10 MG/ML
INJECTION, SOLUTION INTRAVENOUS
Status: DISCONTINUED | OUTPATIENT
Start: 2024-11-20 | End: 2024-11-20

## 2024-11-20 RX ORDER — FAMOTIDINE 10 MG/ML
INJECTION INTRAVENOUS
Status: DISCONTINUED | OUTPATIENT
Start: 2024-11-20 | End: 2024-11-20

## 2024-11-20 RX ORDER — REMIFENTANIL HYDROCHLORIDE 1 MG/ML
INJECTION, POWDER, LYOPHILIZED, FOR SOLUTION INTRAVENOUS CONTINUOUS PRN
Status: DISCONTINUED | OUTPATIENT
Start: 2024-11-20 | End: 2024-11-20

## 2024-11-20 RX ORDER — IBUPROFEN 200 MG
24 TABLET ORAL
Status: DISCONTINUED | OUTPATIENT
Start: 2024-11-20 | End: 2024-11-24 | Stop reason: HOSPADM

## 2024-11-20 RX ADMIN — FAMOTIDINE 20 MG: 20 TABLET ORAL at 12:11

## 2024-11-20 RX ADMIN — OXYCODONE HYDROCHLORIDE 10 MG: 10 TABLET ORAL at 04:11

## 2024-11-20 RX ADMIN — GABAPENTIN 300 MG: 300 CAPSULE ORAL at 08:11

## 2024-11-20 RX ADMIN — ACETAMINOPHEN 1000 MG: 500 TABLET ORAL at 01:11

## 2024-11-20 RX ADMIN — NITROFURANTOIN MONOHYDRATE/MACROCRYSTALS 100 MG: 25; 75 CAPSULE ORAL at 08:11

## 2024-11-20 RX ADMIN — Medication 10 MG: at 10:11

## 2024-11-20 RX ADMIN — CEFAZOLIN 2 G: 2 INJECTION, POWDER, FOR SOLUTION INTRAMUSCULAR; INTRAVENOUS at 08:11

## 2024-11-20 RX ADMIN — PHENYLEPHRINE HYDROCHLORIDE 0.3 MCG/KG/MIN: 10 INJECTION INTRAVENOUS at 09:11

## 2024-11-20 RX ADMIN — LIDOCAINE HYDROCHLORIDE 100 MG: 20 INJECTION, SOLUTION EPIDURAL; INFILTRATION; INTRACAUDAL; PERINEURAL at 08:11

## 2024-11-20 RX ADMIN — LABETALOL HYDROCHLORIDE 10 MG: 5 INJECTION, SOLUTION INTRAVENOUS at 12:11

## 2024-11-20 RX ADMIN — HYDRALAZINE HYDROCHLORIDE 10 MG: 20 INJECTION INTRAMUSCULAR; INTRAVENOUS at 01:11

## 2024-11-20 RX ADMIN — DEXAMETHASONE SODIUM PHOSPHATE 4 MG: 4 INJECTION INTRA-ARTICULAR; INTRALESIONAL; INTRAMUSCULAR; INTRAVENOUS; SOFT TISSUE at 08:11

## 2024-11-20 RX ADMIN — ROCURONIUM BROMIDE 30 MG: 10 INJECTION, SOLUTION INTRAVENOUS at 09:11

## 2024-11-20 RX ADMIN — METOPROLOL TARTRATE 12.5 MG: 25 TABLET, FILM COATED ORAL at 09:11

## 2024-11-20 RX ADMIN — SCOLOPAMINE TRANSDERMAL SYSTEM 1 PATCH: 1 PATCH, EXTENDED RELEASE TRANSDERMAL at 07:11

## 2024-11-20 RX ADMIN — MIDODRINE HYDROCHLORIDE 5 MG: 5 TABLET ORAL at 09:11

## 2024-11-20 RX ADMIN — BUSPIRONE HYDROCHLORIDE 10 MG: 10 TABLET ORAL at 09:11

## 2024-11-20 RX ADMIN — EPHEDRINE SULFATE 10 MG: 50 INJECTION INTRAVENOUS at 09:11

## 2024-11-20 RX ADMIN — INSULIN ASPART 1 UNITS: 100 INJECTION, SOLUTION INTRAVENOUS; SUBCUTANEOUS at 05:11

## 2024-11-20 RX ADMIN — EPHEDRINE SULFATE 10 MG: 50 INJECTION INTRAVENOUS at 10:11

## 2024-11-20 RX ADMIN — EPHEDRINE SULFATE 5 MG: 50 INJECTION INTRAVENOUS at 10:11

## 2024-11-20 RX ADMIN — LIDOCAINE HYDROCHLORIDE 1 MG: 10 INJECTION, SOLUTION EPIDURAL; INFILTRATION; INTRACAUDAL; PERINEURAL at 07:11

## 2024-11-20 RX ADMIN — PROPOFOL 150 MCG/KG/MIN: 10 INJECTION, EMULSION INTRAVENOUS at 08:11

## 2024-11-20 RX ADMIN — REMIFENTANIL HYDROCHLORIDE 0.2 MCG/KG/MIN: 1 INJECTION, POWDER, LYOPHILIZED, FOR SOLUTION INTRAVENOUS at 08:11

## 2024-11-20 RX ADMIN — SODIUM CHLORIDE, SODIUM GLUCONATE, SODIUM ACETATE, POTASSIUM CHLORIDE, MAGNESIUM CHLORIDE, SODIUM PHOSPHATE, DIBASIC, AND POTASSIUM PHOSPHATE: .53; .5; .37; .037; .03; .012; .00082 INJECTION, SOLUTION INTRAVENOUS at 08:11

## 2024-11-20 RX ADMIN — DOCUSATE SODIUM 100 MG: 100 CAPSULE, LIQUID FILLED ORAL at 08:11

## 2024-11-20 RX ADMIN — Medication 20 MG: at 09:11

## 2024-11-20 RX ADMIN — MUPIROCIN: 20 OINTMENT TOPICAL at 07:11

## 2024-11-20 RX ADMIN — Medication 20 MG: at 08:11

## 2024-11-20 RX ADMIN — HYDROMORPHONE HYDROCHLORIDE 0.2 MG: 1 INJECTION, SOLUTION INTRAMUSCULAR; INTRAVENOUS; SUBCUTANEOUS at 12:11

## 2024-11-20 RX ADMIN — DOCUSATE SODIUM 100 MG: 100 CAPSULE, LIQUID FILLED ORAL at 12:11

## 2024-11-20 RX ADMIN — OXCARBAZEPINE 150 MG: 150 TABLET, FILM COATED ORAL at 09:11

## 2024-11-20 RX ADMIN — DEXMEDETOMIDINE 12 MCG: 100 INJECTION, SOLUTION, CONCENTRATE INTRAVENOUS at 10:11

## 2024-11-20 RX ADMIN — HYDROMORPHONE HYDROCHLORIDE 0.2 MG: 1 INJECTION, SOLUTION INTRAMUSCULAR; INTRAVENOUS; SUBCUTANEOUS at 02:11

## 2024-11-20 RX ADMIN — MIDAZOLAM 2 MG: 1 INJECTION INTRAMUSCULAR; INTRAVENOUS at 08:11

## 2024-11-20 RX ADMIN — PROPOFOL 150 MG: 10 INJECTION, EMULSION INTRAVENOUS at 08:11

## 2024-11-20 RX ADMIN — CEFAZOLIN 2 G: 2 INJECTION, POWDER, FOR SOLUTION INTRAMUSCULAR; INTRAVENOUS at 04:11

## 2024-11-20 RX ADMIN — SUCCINYLCHOLINE CHLORIDE 100 MG: 20 INJECTION, SOLUTION INTRAMUSCULAR; INTRAVENOUS; PARENTERAL at 08:11

## 2024-11-20 RX ADMIN — ACETAMINOPHEN 1000 MG: 500 TABLET ORAL at 08:11

## 2024-11-20 RX ADMIN — GABAPENTIN 300 MG: 300 CAPSULE ORAL at 02:11

## 2024-11-20 RX ADMIN — FAMOTIDINE 20 MG: 20 TABLET ORAL at 08:11

## 2024-11-20 RX ADMIN — ONDANSETRON 4 MG: 2 INJECTION INTRAMUSCULAR; INTRAVENOUS at 10:11

## 2024-11-20 RX ADMIN — PHENYLEPHRINE HYDROCHLORIDE 200 MCG: 10 INJECTION INTRAVENOUS at 08:11

## 2024-11-20 RX ADMIN — SODIUM CHLORIDE: 9 INJECTION, SOLUTION INTRAVENOUS at 12:11

## 2024-11-20 RX ADMIN — FAMOTIDINE 20 MG: 10 INJECTION, SOLUTION INTRAVENOUS at 10:11

## 2024-11-20 RX ADMIN — POLYETHYLENE GLYCOL 3350 17 G: 17 POWDER, FOR SOLUTION ORAL at 12:11

## 2024-11-20 RX ADMIN — SUGAMMADEX 200 MG: 100 INJECTION, SOLUTION INTRAVENOUS at 09:11

## 2024-11-20 RX ADMIN — FENTANYL CITRATE 100 MCG: 50 INJECTION, SOLUTION INTRAMUSCULAR; INTRAVENOUS at 08:11

## 2024-11-20 NOTE — TRANSFER OF CARE
"Anesthesia Transfer of Care Note    Patient: Ana Lilia Matos    Procedure(s) Performed: Procedure(s) (LRB):  FUSION, SPINE, CERVICAL, POSTERIOR APPROACH **LOOP X** C2-T1 REVISION DEPUY SNS: MOTORS/SSEP/EMG 3500 BED, METAL ANDERSEN, HEAD TO CORE (N/A)    Patient location: PACU    Anesthesia Type: general    Transport from OR: Transported from OR on 6-10 L/min O2 by face mask with adequate spontaneous ventilation    Post pain: adequate analgesia    Post assessment: no apparent anesthetic complications and tolerated procedure well    Post vital signs: stable    Level of consciousness: awake    Nausea/Vomiting: no nausea/vomiting    Complications: none    Transfer of care protocol was followed      Last vitals: Visit Vitals  /76 (BP Location: Left arm, Patient Position: Lying)   Pulse 76   Temp 36.5 °C (97.7 °F) (Temporal)   Resp 16   Ht 5' 6" (1.676 m)   Wt 70 kg (154 lb 5.2 oz)   LMP  (LMP Unknown)   SpO2 (!) 94%   Breastfeeding No   BMI 24.91 kg/m²     "

## 2024-11-20 NOTE — ASSESSMENT & PLAN NOTE
Ana Lilia Matos is a 63 y.o. female is s/p Revision C2-T1 PCF 11/20/24    Surgical dressing C/D/I  Pain control: multimodal  PT/OT: WBAT BLE, spine precautions, C collar all times   DVT PPx: FCDs at all times when not ambulating  Abx: postop Ancef x 24hrs   Drain: x2  Silva: None  Nursing: Incentive spirometry, Monitor and record drain output each shift     Dispo: plan to dc pending pain control and drain output    Output by Drain (mL) 11/20/24 0701 - 11/20/24 1900 11/20/24 1901 - 11/21/24 0700 11/21/24 0701 - 11/21/24 1900 11/21/24 1901 - 11/22/24 0700 11/22/24 0701 - 11/22/24 0747        Closed/Suction Drain 11/20/24 1022 Posterior Neck Accordion 10 Fr. 0  10 15         Closed/Suction Drain 11/20/24 1023 Posterior;Right Neck Accordion 1 Fr. 5 5 80 30

## 2024-11-20 NOTE — PLAN OF CARE
Problem: Adult Inpatient Plan of Care  Goal: Plan of Care Review  Outcome: Progressing  Goal: Patient-Specific Goal (Individualized)  Outcome: Progressing  Goal: Absence of Hospital-Acquired Illness or Injury  Outcome: Progressing  Goal: Optimal Comfort and Wellbeing  Outcome: Progressing  Goal: Readiness for Transition of Care  Outcome: Progressing     Problem: Wound  Goal: Optimal Coping  Outcome: Progressing  Goal: Optimal Functional Ability  Outcome: Progressing  Goal: Absence of Infection Signs and Symptoms  Outcome: Progressing  Goal: Improved Oral Intake  Outcome: Progressing  Goal: Optimal Pain Control and Function  Outcome: Progressing  Goal: Skin Health and Integrity  Outcome: Progressing  Goal: Optimal Wound Healing  Outcome: Progressing     Problem: Fall Injury Risk  Goal: Absence of Fall and Fall-Related Injury  Outcome: Progressing     Addressed patients pain, pump, position, need for potty, and possessions in reach. Patient remains free of falls, and verbalizes understanding in fall prevention and safety. Safety measures remain in place. Reinforced fall prevention and safety education. Call light and personal belongings within reach, bed in lowest position with wheels locked, side rails up x2, Instructed to call with any needs or complaints, verbalized understanding. Continue current plan of care.

## 2024-11-20 NOTE — ANESTHESIA PROCEDURE NOTES
Intubation    Date/Time: 11/20/2024 8:19 AM    Performed by: Harris Granger CRNA  Authorized by: Dale Lao MD    Intubation:     Induction:  Intravenous    Intubated:  Postinduction    Mask Ventilation:  Easy mask    Attempts:  1    Attempted By:  CRNA    Method of Intubation:  Video laryngoscopy    Blade:  Paz 3    Laryngeal View Grade: Grade I - full view of cords      Difficult Airway Encountered?: No      Complications:  None    Airway Device:  Oral endotracheal tube    Airway Device Size:  7.0    Style/Cuff Inflation:  Cuffed (inflated to minimal occlusive pressure)    Secured at:  The lips    Placement Verified By:  Capnometry    Complicating Factors:  None    Findings Post-Intubation:  BS equal bilateral and atraumatic/condition of teeth unchanged

## 2024-11-20 NOTE — PROGRESS NOTES
Patient arrived to room 502 via bed, family present at bedside, VS taken and documented, admission documentation completed, SCD's applied, instructed on IS use, oriented to room and equipment, allowed time for questions, all questions answered, no further concerns voiced at this time, safety measures in place, call bell with in reach, instructed to call with any needs, verbalized understanding, continue current plan of care.

## 2024-11-20 NOTE — ANESTHESIA POSTPROCEDURE EVALUATION
Anesthesia Post Evaluation    Patient: Ana Lilia Matos    Procedure(s) Performed: Procedure(s) (LRB):  FUSION, SPINE, CERVICAL, POSTERIOR APPROACH **LOOP X** C2-T1 REVISION DEPUY SNS: MOTORS/SSEP/EMG 3500 BED, METAL [place], HEAD TO CORE (N/A)    Final Anesthesia Type: general      Patient location during evaluation: PACU  Patient participation: Yes- Able to Participate  Level of consciousness: awake and alert  Post-procedure vital signs: reviewed and stable  Pain management: adequate  Airway patency: patent  UNIQUE mitigation strategies: Multimodal analgesia, Preoperative use of mandibular advancement devices or oral appliances and Intraoperative administration of CPAP, nasopharyngeal airway, or oral appliance during sedation  PONV status at discharge: No PONV  Anesthetic complications: no      Cardiovascular status: blood pressure returned to baseline, hemodynamically stable and stable  Respiratory status: unassisted and spontaneous ventilation  Hydration status: euvolemic  Follow-up not needed.              Vitals Value Taken Time   /77 11/20/24 1401   Temp 36.1 °C (97 °F) 11/20/24 1230   Pulse 97 11/20/24 1401   Resp 16 11/20/24 1401   SpO2 96 % 11/20/24 1401   Vitals shown include unfiled device data.      No case tracking events are documented in the log.      Pain/Davi Score: Pain Rating Prior to Med Admin: 5 (11/20/2024  1:11 PM)  Pain Rating Post Med Admin: 8 (11/20/2024 12:30 PM)  Davi Score: 8 (11/20/2024  1:30 PM)

## 2024-11-20 NOTE — OP NOTE
DATE OF PROCEDURE: 11/20/2024.     PRIMARY SURGEON: Marlon Cruz M.D.     CO-SURGEON: DO JOSEPH Steve    FIRST ASSISTANT: Malcolm Granados MD, PGY-4 Orthopedics     PREOPERATIVE DIAGNOSIS:     1. 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 Cervical pseudoarthrosis with hardware failure  2. History of C3-T1 PCDF     POSTOPERATIVE DIAGNOSIS: Same      PROCEDURES PERFORMED:  1.  Revision posterior cervical spinal fusion C2-T1  2.  Posterior segmental instrumentation C3-T1  3.  Cadaveric bone grafting.  4. Brainlab assistance for placement of the pedicle screws (requiring greater than 30 minutes of preoperative planning time)  5.  Hardware removal C3  6.  Bilateral paraspinal advancement flap 9 cm x 4 cm     ANESTHESIA: General endotracheal anesthesia.     ESTIMATED BLOOD LOSS:  25 mL.     IMPLANTS: Depuy  C2: 3.5x16mm lateral mass screws bilaterally  C3: 4.0x14mm LM screws  3.5mm Ti rods bilaterally  Extra-small Infuse  15cc of cancellous chips     SPECIMENS: None.     FINDINGS:  loose C3 screws     DRAINS: One deep and one superficial     COMPLICATIONS: None.     SPONGE AND NEEDLE COUNT: Correct x2.     NEUROLOGICAL MONITORING: Motor evoked potentials, somatosensory evoked potential, and free-running EMG.  There were no changes to stable and reliable bilateral upper and lower extremity motor and somatosensory potentials throughout the entire procedure.     DESCRIPTION INFORMED CONSENT: I had a sit down discussion with the patient regarding the planned procedure. We specifically discussed the risks, benefits, and alternatives to surgery. We discussed the surgical procedure including the skin incision, nerve decompression, bone fusion, allograft and/or iliac crest bone graft, and surgical implants including lateral mass screws and pedicle screws as indicated: they understand the risks include but are not limited to death, paralysis, blindness, bleeding, infection, damage to arteries, veins and nerves, vertebral artery injury, dysphagia,  spinal fluid leak, continued or worsening pain, no improvement in symptoms, non-union, and the possible need for more surgery in the future, as well as the possibility other unforseen and unknown complications. We talked about expected hospital stay and recovery period. All questions were answered; they understand and wish to proceed.     REASON FOR OPERATION AND BRIEF HISTORY AND PHYSICAL: The patient is a 63 y.o. female with a hx of C3-T1 PCDF presented with C2-3 PJK, pseudoarthrosis and hardware failure. She is here for surgery today.     DESCRIPTION OF PROCEDURE: The patient was met in the preoperative area where his posterior cervical spine was marked as the operative site. Subsequently, they were brought to the Operating Room where they was placed under general endotracheal anesthesia. Sequential compression devices were placed in the patient's bilateral calves and run throughout the case. [place] head-barboza was then placed in the standard sterile fashion and the patient was positioned prone on a slider bed with gel pads. The arms were padded talked. The shoulders were gently taped down. The posterior cervical spine was then prepped and draped in a normal sterile fashion.     A full timeout was then called, identifying the patient, the procedural site and levels, the availability of all instruments and implants, and no specific nursing, surgical, anesthetic, or neurological monitoring concerns. Finally, it was safe to proceed with surgery and the patient was given weight-appropriate dose of Ancef by the Anesthesia Service.     We then extended the previous midline posterior cervical incision and performed a preliminary subperiosteal exposure to C2-T1. Previous hardware from C3-T1 was visualized. The set screws and rods were removed. The bilateral C3 screws were grossly loose, so they were removed. We inserted longer and larger diameter screws at C3.     The neuro navigation array was docked onto the [place]  head-barboza. Surface matching was achieved via the [place] interface to the preop CT scan. Using neuro-navigation, bilateral C2 pars screws were inserted. Fluoroscopy confirmed good hardware position. Motors following were consistent with baseline.    Rods were then measured, cut, contoured and locked into place with provided set plugs and torque wrench. Infuse sponges were laid on top of bony surfaces. It was combined with 15cc of allograft and was laid dorsally along the decorticated surfaces from C2-T1 bilaterally. 1g of vancomycin powder was spread in the wound. One 10-[place] hemovac drain was placed deep and was left to gravity.     Bilateral paraspinal muscles were mobilized based on lateral perforators and advanced to obliterate the dead space at midline. The deep fascia was then created 9 cm in length by 4 cm in width in order to create a tension free water-tight closure and to assist with wound healing. At this point the wound was closed in layers using 0 Ethibond for the fascia. One superficial 10-[place]gian HV drain was placed to suction. Would was closed in layers with 2-0 Vicryl for the dermis, 3-0 Monocryl for the skin, and dermabond with staples.     Cervical collar was placed. The patient was then flipped supine. The [place] head-barboza was removed. She was extubated and transferred to the recovery room in stable condition.    JUSTIFICATION OF MODIFIER -22 AND CO-SURGEON: This was a complex revision posterior cervical case in a patient with hardware failure and pseudoarthrosis. Two attending surgeons were indicated to reduce operative times, blood loss, accurately place hardware, and to improve patient outcomes.    Marlon Cruz MD  Orthopaedic Spine Surgeon  Department of Orthopaedic Surgery  [phone]

## 2024-11-20 NOTE — NURSING TRANSFER
Nursing Transfer Note      11/20/2024   12:21 PM    Nurse giving handoff:Ajay FRIAS Rn  Nurse receiving handoff:    Reason patient is being transferred: postop    Transfer To: poss    Transfer via bed    Transfer with n/a    Transported by pacu transport    Transfer Vital Signs:  Blood Pressure:see flowsheet  Heart Rate:  O2:  Temperature:  Respirations:    Telemetry: n/a  Order for Tele Monitor? No    Additional Lines: n/a    Medicines sent: NS    Any special needs or follow-up needed:     Patient belongings transferred with patient: No    Chart send with patient: Yes    Notified: daughter    Patient reassessed at: 11/20/24  1  Upon arrival to floor: bed in lowest position

## 2024-11-20 NOTE — H&P
AALIYAH ARAGON   [facility]  [address] [address]         DATE: 11/20/2024  PATIENT: Ana Lilia Matos    Attending Physician: Marlon Cruz M.D.    2/10/23: C3-T1 PCDF    HISTORY:  Ana Lilia Matos is a 63 y.o. female who returns to me today for CT review. She reports that anytime she performs ADLs/use her arms she has a tightness in her neck and shoulder. She reports that she has been having "electrical impulses" going down from her neck to her left shoulder and anterior chest wall and that her hands will periodically go numb. She reports that the numbness in her hands alternates and is diffuse in nature. She reports episodes of dizziness also. She reports she has been evaluated by a neurologist with no relief of her symptoms related to her dizziness.     The patient does not smoke or endorse IVDU. She has DM (HA1C of 5.8). The patient is not on any blood thinners and does not take chronic narcotics. She is disabled due to her neck. She was accompanied by her 2 daughters, and they drove 5 hours from [place].    PMH/PSH/FamHx/SocHx:  Unchanged from prior visit    ROS:  Positive for neck pain  Denies myelopathic symptoms, perineal paresthesias, bowel or bladder incontinence    EXAM:  /76 (BP Location: Left arm, Patient Position: Lying)   Pulse 76   Temp 97.7 °F (36.5 °C) (Temporal)   Resp 16   Ht 5' 6" (1.676 m)   Wt 70 kg (154 lb 5.2 oz)   LMP  (LMP Unknown)   SpO2 (!) 94%   Breastfeeding No   BMI 24.91 kg/m²     My physical examination was notable for the following findings: motor intact BUE; SILT    IMAGING:  Today I independently reviewed the following images and my interpretations are as follows:    Previous AP and Lat upright C-spine films showed C3-T1 PCDF without hardware complications. C2-3 anterolisthesis and kyphosis when upright.    MRI cervical showed no significant stenosis. Well decompressed spine. C2-3 listhesis reduces when supine.    Recent DEXA scan from OSH showed lumbar T-score of -2.7.    CT cervical showed C3 screws " loosening.    ASSESSMENT/PLAN:  Patient has cervical pseudoarthrosis and hardware failure, in the setting of prior C3-T1 PCDF. Will plan for extension to C2.     I had a sit down discussion with the patient regarding a C2 extension fusion. We specifically discussed the risks, benefits, and alternatives to surgery. We discussed the surgical procedure including the skin incision, nerve decompression, bone fusion, allograft and/or iliac crest bone graft, and surgical implants including lateral mass screws and pedicle screws as indicated: they understand the risks include but are not limited to death, paralysis, blindness, bleeding, infection, damage to arteries, veins and nerves, vertebral artery injury, dysphagia, spinal fluid leak, continued or worsening pain, no improvement in symptoms, non-union, and the possible need for more surgery in the future, as well as the possibility other unforseen and unknown complications. We talked about expected hospital stay and recovery period. All questions were answered; they understand and wish to proceed.    Marlon Cruz MD  Orthopaedic Spine Surgeon  Department of Orthopaedic Surgery  [phone]

## 2024-11-21 LAB
ALBUMIN SERPL BCP-MCNC: 2.1 G/DL (ref 3.5–5.2)
ALBUMIN SERPL BCP-MCNC: 3.4 G/DL (ref 3.5–5.2)
ALP SERPL-CCNC: 28 U/L (ref 40–150)
ALP SERPL-CCNC: 43 U/L (ref 40–150)
ALT SERPL W/O P-5'-P-CCNC: 10 U/L (ref 10–44)
ALT SERPL W/O P-5'-P-CCNC: 8 U/L (ref 10–44)
ANION GAP SERPL CALC-SCNC: 5 MMOL/L (ref 8–16)
ANION GAP SERPL CALC-SCNC: 5 MMOL/L (ref 8–16)
AST SERPL-CCNC: 10 U/L (ref 10–40)
AST SERPL-CCNC: 13 U/L (ref 10–40)
BILIRUB SERPL-MCNC: 0.1 MG/DL (ref 0.1–1)
BILIRUB SERPL-MCNC: 0.3 MG/DL (ref 0.1–1)
BUN SERPL-MCNC: 4 MG/DL (ref 8–23)
BUN SERPL-MCNC: <3 MG/DL (ref 8–23)
CALCIUM SERPL-MCNC: 5.4 MG/DL (ref 8.7–10.5)
CALCIUM SERPL-MCNC: 8.4 MG/DL (ref 8.7–10.5)
CHLORIDE SERPL-SCNC: 105 MMOL/L (ref 95–110)
CHLORIDE SERPL-SCNC: 119 MMOL/L (ref 95–110)
CO2 SERPL-SCNC: 17 MMOL/L (ref 23–29)
CO2 SERPL-SCNC: 25 MMOL/L (ref 23–29)
CREAT SERPL-MCNC: 0.5 MG/DL (ref 0.5–1.4)
CREAT SERPL-MCNC: 0.8 MG/DL (ref 0.5–1.4)
ERYTHROCYTE [DISTWIDTH] IN BLOOD BY AUTOMATED COUNT: 14 % (ref 11.5–14.5)
EST. GFR  (NO RACE VARIABLE): >60 ML/MIN/1.73 M^2
EST. GFR  (NO RACE VARIABLE): >60 ML/MIN/1.73 M^2
GLUCOSE SERPL-MCNC: 106 MG/DL (ref 70–110)
GLUCOSE SERPL-MCNC: 122 MG/DL (ref 70–110)
HCT VFR BLD AUTO: 26.7 % (ref 37–48.5)
HGB BLD-MCNC: 9.2 G/DL (ref 12–16)
MCH RBC QN AUTO: 29.9 PG (ref 27–31)
MCHC RBC AUTO-ENTMCNC: 34.5 G/DL (ref 32–36)
MCV RBC AUTO: 87 FL (ref 82–98)
PLATELET # BLD AUTO: 154 K/UL (ref 150–450)
PMV BLD AUTO: 9.4 FL (ref 9.2–12.9)
POCT GLUCOSE: 110 MG/DL (ref 70–110)
POCT GLUCOSE: 121 MG/DL (ref 70–110)
POCT GLUCOSE: 130 MG/DL (ref 70–110)
POCT GLUCOSE: 153 MG/DL (ref 70–110)
POTASSIUM SERPL-SCNC: 2.6 MMOL/L (ref 3.5–5.1)
POTASSIUM SERPL-SCNC: 4 MMOL/L (ref 3.5–5.1)
PROT SERPL-MCNC: 3.6 G/DL (ref 6–8.4)
PROT SERPL-MCNC: 6 G/DL (ref 6–8.4)
RBC # BLD AUTO: 3.08 M/UL (ref 4–5.4)
SODIUM SERPL-SCNC: 135 MMOL/L (ref 136–145)
SODIUM SERPL-SCNC: 141 MMOL/L (ref 136–145)
WBC # BLD AUTO: 5.51 K/UL (ref 3.9–12.7)

## 2024-11-21 PROCEDURE — 80053 COMPREHEN METABOLIC PANEL: CPT | Mod: 91

## 2024-11-21 PROCEDURE — 97161 PT EVAL LOW COMPLEX 20 MIN: CPT

## 2024-11-21 PROCEDURE — 63600175 PHARM REV CODE 636 W HCPCS: Performed by: STUDENT IN AN ORGANIZED HEALTH CARE EDUCATION/TRAINING PROGRAM

## 2024-11-21 PROCEDURE — 36415 COLL VENOUS BLD VENIPUNCTURE: CPT

## 2024-11-21 PROCEDURE — 97116 GAIT TRAINING THERAPY: CPT

## 2024-11-21 PROCEDURE — 11000001 HC ACUTE MED/SURG PRIVATE ROOM

## 2024-11-21 PROCEDURE — 36415 COLL VENOUS BLD VENIPUNCTURE: CPT | Performed by: STUDENT IN AN ORGANIZED HEALTH CARE EDUCATION/TRAINING PROGRAM

## 2024-11-21 PROCEDURE — 25000003 PHARM REV CODE 250

## 2024-11-21 PROCEDURE — 97535 SELF CARE MNGMENT TRAINING: CPT

## 2024-11-21 PROCEDURE — 25000003 PHARM REV CODE 250: Performed by: STUDENT IN AN ORGANIZED HEALTH CARE EDUCATION/TRAINING PROGRAM

## 2024-11-21 PROCEDURE — 80053 COMPREHEN METABOLIC PANEL: CPT | Performed by: STUDENT IN AN ORGANIZED HEALTH CARE EDUCATION/TRAINING PROGRAM

## 2024-11-21 PROCEDURE — 97165 OT EVAL LOW COMPLEX 30 MIN: CPT

## 2024-11-21 PROCEDURE — 97530 THERAPEUTIC ACTIVITIES: CPT

## 2024-11-21 PROCEDURE — 85027 COMPLETE CBC AUTOMATED: CPT | Performed by: STUDENT IN AN ORGANIZED HEALTH CARE EDUCATION/TRAINING PROGRAM

## 2024-11-21 RX ORDER — POTASSIUM CHLORIDE 750 MG/1
10 CAPSULE, EXTENDED RELEASE ORAL ONCE
Status: COMPLETED | OUTPATIENT
Start: 2024-11-21 | End: 2024-11-21

## 2024-11-21 RX ORDER — CALCIUM CARBONATE 200(500)MG
500 TABLET,CHEWABLE ORAL ONCE
Status: COMPLETED | OUTPATIENT
Start: 2024-11-21 | End: 2024-11-21

## 2024-11-21 RX ORDER — CALCIUM CARBONATE 200(500)MG
500 TABLET,CHEWABLE ORAL DAILY PRN
Status: DISCONTINUED | OUTPATIENT
Start: 2024-11-21 | End: 2024-11-24 | Stop reason: HOSPADM

## 2024-11-21 RX ADMIN — GABAPENTIN 300 MG: 300 CAPSULE ORAL at 03:11

## 2024-11-21 RX ADMIN — ACETAMINOPHEN 1000 MG: 500 TABLET ORAL at 05:11

## 2024-11-21 RX ADMIN — NITROFURANTOIN MONOHYDRATE/MACROCRYSTALS 100 MG: 25; 75 CAPSULE ORAL at 08:11

## 2024-11-21 RX ADMIN — OXCARBAZEPINE 150 MG: 150 TABLET, FILM COATED ORAL at 08:11

## 2024-11-21 RX ADMIN — SODIUM CHLORIDE: 9 INJECTION, SOLUTION INTRAVENOUS at 05:11

## 2024-11-21 RX ADMIN — METHOCARBAMOL 1000 MG: 500 TABLET ORAL at 09:11

## 2024-11-21 RX ADMIN — METOPROLOL TARTRATE 12.5 MG: 25 TABLET, FILM COATED ORAL at 09:11

## 2024-11-21 RX ADMIN — NITROFURANTOIN MONOHYDRATE/MACROCRYSTALS 100 MG: 25; 75 CAPSULE ORAL at 09:11

## 2024-11-21 RX ADMIN — METOPROLOL TARTRATE 12.5 MG: 25 TABLET, FILM COATED ORAL at 08:11

## 2024-11-21 RX ADMIN — METHOCARBAMOL 1000 MG: 500 TABLET ORAL at 03:11

## 2024-11-21 RX ADMIN — OXCARBAZEPINE 150 MG: 150 TABLET, FILM COATED ORAL at 09:11

## 2024-11-21 RX ADMIN — GABAPENTIN 300 MG: 300 CAPSULE ORAL at 08:11

## 2024-11-21 RX ADMIN — POLYETHYLENE GLYCOL 3350 17 G: 17 POWDER, FOR SOLUTION ORAL at 08:11

## 2024-11-21 RX ADMIN — SODIUM CHLORIDE: 9 INJECTION, SOLUTION INTRAVENOUS at 08:11

## 2024-11-21 RX ADMIN — ATORVASTATIN CALCIUM 20 MG: 20 TABLET, FILM COATED ORAL at 08:11

## 2024-11-21 RX ADMIN — METHOCARBAMOL 1000 MG: 500 TABLET ORAL at 08:11

## 2024-11-21 RX ADMIN — DULOXETINE HYDROCHLORIDE 120 MG: 60 CAPSULE, DELAYED RELEASE ORAL at 08:11

## 2024-11-21 RX ADMIN — OXYCODONE HYDROCHLORIDE 10 MG: 10 TABLET ORAL at 09:11

## 2024-11-21 RX ADMIN — CEFAZOLIN 2 G: 2 INJECTION, POWDER, FOR SOLUTION INTRAMUSCULAR; INTRAVENOUS at 12:11

## 2024-11-21 RX ADMIN — CALCIUM CARBONATE (ANTACID) CHEW TAB 500 MG 500 MG: 500 CHEW TAB at 09:11

## 2024-11-21 RX ADMIN — DOCUSATE SODIUM 100 MG: 100 CAPSULE, LIQUID FILLED ORAL at 08:11

## 2024-11-21 RX ADMIN — MIDODRINE HYDROCHLORIDE 5 MG: 5 TABLET ORAL at 09:11

## 2024-11-21 RX ADMIN — MIDODRINE HYDROCHLORIDE 5 MG: 5 TABLET ORAL at 08:11

## 2024-11-21 RX ADMIN — GABAPENTIN 300 MG: 300 CAPSULE ORAL at 09:11

## 2024-11-21 RX ADMIN — LORAZEPAM 1 MG: 1 TABLET ORAL at 08:11

## 2024-11-21 RX ADMIN — FAMOTIDINE 20 MG: 20 TABLET ORAL at 08:11

## 2024-11-21 RX ADMIN — POTASSIUM CHLORIDE 10 MEQ: 750 CAPSULE, EXTENDED RELEASE ORAL at 09:11

## 2024-11-21 RX ADMIN — POTASSIUM CHLORIDE 10 MEQ: 750 CAPSULE, EXTENDED RELEASE ORAL at 05:11

## 2024-11-21 RX ADMIN — DOCUSATE SODIUM 100 MG: 100 CAPSULE, LIQUID FILLED ORAL at 09:11

## 2024-11-21 RX ADMIN — CELECOXIB 100 MG: 100 CAPSULE ORAL at 08:11

## 2024-11-21 RX ADMIN — OXYCODONE HYDROCHLORIDE 10 MG: 10 TABLET ORAL at 05:11

## 2024-11-21 RX ADMIN — BUSPIRONE HYDROCHLORIDE 10 MG: 10 TABLET ORAL at 08:11

## 2024-11-21 RX ADMIN — CELECOXIB 100 MG: 100 CAPSULE ORAL at 09:11

## 2024-11-21 RX ADMIN — BUSPIRONE HYDROCHLORIDE 10 MG: 10 TABLET ORAL at 09:11

## 2024-11-21 RX ADMIN — FAMOTIDINE 20 MG: 20 TABLET ORAL at 09:11

## 2024-11-21 RX ADMIN — CALCIUM CARBONATE (ANTACID) CHEW TAB 500 MG 500 MG: 500 CHEW TAB at 05:11

## 2024-11-21 NOTE — PLAN OF CARE
Problem: Physical Therapy  Goal: Physical Therapy Goal  Description: Goals to be met by: 12/21/2024     Patient will increase functional independence with mobility by performing:    Supine to sit with Stand-by Assistance  Sit to supine with Stand-by Assistance  Rolling to Left and Right with Stand-by Assistance.  Sit to stand transfer with Stand-by Assistance  Bed to chair transfer with Stand-by Assistance using Rolling Walker  Gait  x 50 feet with Contact Guard Assistance using Rolling Walker.   Ascend/Descend 4 stair steps with Contact Guard Assistance using No Assistive Device and BHRs    Outcome: Progressing

## 2024-11-21 NOTE — PLAN OF CARE
Problem: Occupational Therapy  Goal: Occupational Therapy Goal  Description: Goals to be met by: 12/21/24     Patient will increase functional independence with ADLs by performing:    UE Dressing with Supervision.  LE Dressing with Stand-by Assistance.  Grooming while standing at sink with Stand-By Assistance  Toileting from toilet with Stand-by Assistance for hygiene and clothing management.   All functional transfers performed with SBA    Outcome: Progressing

## 2024-11-21 NOTE — PT/OT/SLP EVAL
Airway       Patient location during procedure: OR       Procedure Start/Stop Times: 9/23/2022 5:24 PM  Staff -        Anesthesiologist:  Avelina Mills MD       CRNA: Sandrine Navarro APRN CRNA       Performed By: CRNAIndications and Patient Condition       Indications for airway management: go-procedural       Induction type:intravenous       Mask difficulty assessment: 3 - difficult mask (inadequate, unstable, or two providers) +/- NMBA (Suggest using 100 (red) oral airway next time due to large tongue and tissue in oropharynx)    Final Airway Details       Final airway type: endotracheal airway       Successful airway: ETT - single and Oral  Endotracheal Airway Details        ETT size (mm): 8.0       Cuffed: yes       Successful intubation technique: direct laryngoscopy       DL Blade Type: Oliveira 2       Grade View of Cords: 1       Adjucts: stylet       Position: Right       Measured from: gums/teeth       Secured at (cm): 24       Bite block used: None    Post intubation assessment        Placement verified by: capnometry, equal breath sounds and chest rise        Number of attempts at approach: 1       Number of other approaches attempted: 0       Secured with: silk tape and other (comment) (+ tegaderm)       Ease of procedure: easy       Dentition: Intact and Unchanged    Medication(s) Administered   Medication Administration Time: 9/23/2022 5:24 PM       Physical Therapy Evaluation    Patient Name:  Ana Lilia Matos   MRN:  [id number]    Co-treatment performed for this visit due to patient need for two skilled therapists to ensure patient and staff safety and to accommodate for patient activity tolerance/pain management     Recommendations:     Discharge Recommendations: Low Intensity Therapy   Discharge Equipment Recommendations: walker, rolling   Barriers to discharge: None    Assessment:     Ana Lilia Matos is a 63 y.o. female admitted with a medical diagnosis of S/P cervical spinal fusion.  She presents with the following impairments/functional limitations: weakness, impaired functional mobility, gait instability, pain, impaired skin, decreased ROM, orthopedic precautions.  Pt agreeable to PT/OT co-eval. Pt cooperative and pleasant. Pt was CGA with roll R using bed rail and supine<>sit following spinal precautions appropriately. Pt sit<>stand x2 CGA with first trial and ModA for balance demo'ing posterior trunk lean which she was unable to correct. Following 1st STS, pt sat EOB and forgot about recent surgery she had. Per pt daughter, pt has hx of POTS. 2nd trial STS and side steps [place] [facility] [place]. Pt reported increased dizziness while sitting but able to urinate. Pt sit<>supine MaxA x1 for LE management and required TotA x2 scooting to HOB. Pt would continue to benefit from skilled PT to improve her functional mobility to return to home safely.     Rehab Prognosis: Good; patient would benefit from acute skilled PT services to address these deficits and reach maximum level of function.    Recent Surgery: Procedure(s) (LRB):  FUSION, SPINE, CERVICAL, POSTERIOR APPROACH **LOOP X** [facility]EMG 3500 BED, METAL [place], HEAD TO CORE (N/A) 1 Day Post-Op    Plan:     During this hospitalization, patient to be seen daily to address the identified rehab impairments via gait training, therapeutic activities, therapeutic  exercises, neuromuscular re-education and progress toward the following goals:    Plan of Care Expires:  12/21/24    Subjective     Chief Complaint: Cervical pain and dizziness  Patient/Family Comments/goals: Return home  Pain/Comfort:  Pain Rating 1: 9/10  Location - Side 1: Bilateral  Location - Orientation 1: generalized  Location 1: neck  Pain Addressed 1: Pre-medicate for activity, Reposition, Distraction, Cessation of Activity  Pain Rating Post-Intervention 1:  (not rated)    Patients cultural, spiritual, Christian conflicts given the current situation:      Living Environment:  Pt lives with  in [facility] with 4 SIMRAN and BHR. Pt has walk-in shower. WC used for community ambulation as needed.  Prior to admission, patients level of function was I with mobility and ADLs.  Equipment used at home: SC, grab bars, WC .  DME owned (not currently used): none.  Upon discharge, patient will have assistance from .    Objective:     Communicated with RN prior to session.  Patient found HOB elevated with SUKHDEEP drain, SCD  upon PT entry to room. Pt's  and daughter present in room.     General Precautions: Standard, fall  Orthopedic Precautions:spinal precautions   Braces: Cervical collar  Respiratory Status: Room air    Exams:  Cognitive Exam:  Patient is oriented to Person, Place, Time, and Situation  RLE ROM: WFL  RLE Strength: WFL  LLE ROM: WFL  LLE Strength: WFL    Functional Mobility:  Bed Mobility:     Rolling Right: contact guard assistance  Scooting: total assistance and of 2 persons using sheet  Supine to Sit: contact guard assistance  Sit to Supine: moderate assistance and of 2 persons for controlled descent  Transfers:     Sit to Stand:  contact guard assistance with HHA x2  Toilet Transfer: contact guard assistance with  rolling walker  using  BSC  Gait: 4 side steps to turn to BSC  Balance: Pt required ModA for standing balance due to posterior trunk lean during first sit to stand.       AM-PAC 6  CLICK MOBILITY  Total Score:16       Treatment & Education:  Pt edu on:  - Importance of mobility for maximum recovery  - PT role and POC expectations  - Spinal precautions      Patient left HOB elevated with all lines intact, call button in reach, and RN notified.    GOALS:   Multidisciplinary Problems       Physical Therapy Goals          Problem: Physical Therapy    Goal Priority Disciplines Outcome Interventions   Physical Therapy Goal     PT, PT/OT Progressing    Description: Goals to be met by: 12/21/2024     Patient will increase functional independence with mobility by performing:    Supine to sit with Stand-by Assistance  Sit to supine with Stand-by Assistance  Rolling to Left and Right with Stand-by Assistance.  Sit to stand transfer with Stand-by Assistance  Bed to chair transfer with Stand-by Assistance using Rolling Walker  Gait  x 50 feet with Contact Guard Assistance using Rolling Walker.   Ascend/Descend 4 stair steps with Contact Guard Assistance using No Assistive Device and BHRs                         History:     Past Medical History:   Diagnosis Date    Diabetes mellitus, type 2        Past Surgical History:   Procedure Laterality Date    APPENDECTOMY      CHOLECYSTECTOMY      FUSION OF CERVICAL SPINE BY POSTERIOR APPROACH N/A 11/20/2024    Procedure: FUSION, SPINE, CERVICAL, POSTERIOR APPROACH **LOOP X** C2-T1 REVISION DEPUY SNS: MOTORS/SSEP/EMG 3500 BED, [facility], HEAD TO [facility];  Surgeon: Marlon Cruz MD;  Location: [facility] OR [address] [place] [address];  Service: Orthopedics;  Laterality: N/A;    HYSTERECTOMY      POSTERIOR FUSION OF CERVICAL SPINE WITH LAMINECTOMY N/A 2/10/2023    Procedure: LAMINECTOMY, SPINE, CERVICAL, WITH POSTERIOR FUSION C3-T1 PLDF DEPUY GW TONGS SNS: MOTORS/SSEP/EMG;  Surgeon: Marlon Cruz MD;  Location: [facility] OR [address] [place] [address];  Service: Orthopedics;  Laterality: N/A;  C4-6 Laminectomy    SPINAL FUSION         Time Tracking:     PT Received On: 11/21/24  PT Start Time: 0923     PT Stop  Time: 0955  PT Total Time (min): 32 min     Billable Minutes: Evaluation 8, Gait Training 13, and Therapeutic Activity 11      11/21/2024

## 2024-11-21 NOTE — PT/OT/SLP EVAL
Occupational Therapy   Evaluation    Name: Ana Lilia Matos  MRN: [id number]  Admitting Diagnosis: S/P cervical spinal fusion  Recent Surgery: Procedure(s) (LRB):  FUSION, SPINE, CERVICAL, POSTERIOR APPROACH **LOOP X** C2-T1 REVISION DEPUY SNS: MOTORS/SSEP/EMG 3500 BED, METAL ANDERSEN, HEAD TO CORE (N/A) 1 Day Post-Op    Recommendations:     Discharge Recommendations: Low Intensity Therapy  Discharge Equipment Recommendations:  walker, rolling  Barriers to discharge:  None    Justification for Walker HME   The mobility limitation cannot be sufficiently resolved by the use of a cane.   Patient's functional mobility deficit can be sufficiently resolved with the use of a walker.  Patient's mobility limitation significantly impairs their ability to participate in one of more activities of daily living.  The use of a walker will significantly improve the patients ability to participate in MRADLS and the patient will use it on regular basis in the home.   Assessment:   CO-TX with PT for pt safety and max participation with both disciplines.    Ana Lilia Matos is a 63 y.o. female with a medical diagnosis of S/P cervical spinal fusion.  She presents with Neck and back pain. Performance deficits affecting function: weakness, impaired functional mobility, gait instability, decreased ROM, orthopedic precautions.  PTA, pt reports being Indp with ADLs and mobility. Upon eval, pt is limited by pain and dizziness with change in position     Rehab Prognosis: Good; patient would benefit from acute skilled OT services to address these deficits and reach maximum level of function.       Plan:     Patient to be seen 3 x/week to address the above listed problems via self-care/home management, therapeutic activities, therapeutic exercises, neuromuscular re-education  Plan of Care Expires: 12/21/24  Plan of Care Reviewed with: patient, daughter    Subjective     Chief Complaint: Necka nd back pain  Patient/Family Comments/goals:  Return home    Occupational Profile:  Living Environment: Pt lives with  in [facility] with 4 SIMRAN and BHR. Pt has walk-in shower. WC used for community ambulation as needed.   Previous level of function: Indp  Equipment Used at Home: grab bar, shower chair  Assistance upon Discharge:  and dtr    Pain/Comfort:  Pain Rating 1: 9/10  Location - Side 1: Bilateral  Location - Orientation 1: generalized  Location 1: other (see comments) (neck and back)  Pain Addressed 1: Pre-medicate for activity, Reposition, Distraction, Cessation of Activity    Patients cultural, spiritual, Jewish conflicts given the current situation: no    Objective:     Communicated with: Nsg prior to session.  Patient found supine with SCD, hemovac upon OT entry to room;  and dtr present in room.    General Precautions: Standard, fall  Orthopedic Precautions: spinal precautions  Braces: Cervical collar  Respiratory Status: Room air    Occupational Performance:    Bed Mobility:    Patient completed Supine to Sit with contact guard assistance  Patient completed Sit to Supine with contact guard assistance    Functional Mobility/Transfers:  Patient completed Sit <> Stand Transfer with contact guard assistance and of 2 persons  with  hand-held assist   Patient completed Bed <> Bedside Commode Transfer using Stand Pivot technique with contact guard assistance and minimum assistance with hand-held assist  Functional Mobility: (refer to PT note)    Activities of Daily Living:  Upper Body Dressing: maximal assistance adjust and don gown for backside and line mgmt  Lower Body Dressing: maximal assistance don socks  Toileting: stand by assistance pericare and clothing mgmt    Cognitive/Visual Perceptual:  A&O x4 however later in tx session, pt appeared unaware that she had surgery already.    Physical Exam:  BUE WFL; when patient lifts elbows overhead, she states that she gets dizzy.  Balance: intact    AMPAC 6 Click ADL:  AMPAC Total Score:  20    Treatment & Education:  Pt educated on role and purpose of therapy  Pt educated on goal setting  Pt educated on benefits of OOB activity  Pt educated on self advocacy   Pt educated on spinal hip precautions     Patient left HOB elevated with all lines intact, call button in reach, nsg notified, and family present    GOALS:   Multidisciplinary Problems       Occupational Therapy Goals          Problem: Occupational Therapy    Goal Priority Disciplines Outcome Interventions   Occupational Therapy Goal     OT, PT/OT Progressing    Description: Goals to be met by: 12/21/24     Patient will increase functional independence with ADLs by performing:    UE Dressing with Supervision.  LE Dressing with Stand-by Assistance.  Grooming while standing at sink with Stand-By Assistance  Toileting from toilet with Stand-by Assistance for hygiene and clothing management.   All functional transfers performed with SBA                         History:     Past Medical History:   Diagnosis Date    Diabetes mellitus, type 2          Past Surgical History:   Procedure Laterality Date    APPENDECTOMY      CHOLECYSTECTOMY      FUSION OF CERVICAL SPINE BY POSTERIOR APPROACH N/A 11/20/2024    Procedure: FUSION, SPINE, CERVICAL, POSTERIOR APPROACH **LOOP X** C2-T1 REVISION DEPUY SNS: MOTORS/SSEP/EMG 3500 BED, [facility], [place] [facility];  Surgeon: Marlon Cruz MD;  Location: [facility] OR [address] [place] [address];  Service: Orthopedics;  Laterality: N/A;    HYSTERECTOMY      POSTERIOR FUSION OF CERVICAL SPINE WITH LAMINECTOMY N/A 2/10/2023    Procedure: LAMINECTOMY, SPINE, CERVICAL, WITH POSTERIOR FUSION C3-T1 PLDF DEPUY GW BHARATH SNS: MOTORS/SSEP/EMG;  Surgeon: Marlon Cruz MD;  Location: [facility] OR [address] [place] [address];  Service: Orthopedics;  Laterality: N/A;  C4-6 Laminectomy    SPINAL FUSION         Time Tracking:     OT Date of Treatment: 11/21/24  OT Start Time: 0922  OT Stop Time: 0955  OT Total Time (min): 33 min    Billable Minutes:Evaluation 10  Self Care/Home  Management 23    11/21/2024

## 2024-11-21 NOTE — PLAN OF CARE
Problem: Adult Inpatient Plan of Care  Goal: Plan of Care Review  Outcome: Progressing  Goal: Patient-Specific Goal (Individualized)  Outcome: Progressing  Goal: Absence of Hospital-Acquired Illness or Injury  Outcome: Progressing  Goal: Optimal Comfort and Wellbeing  Outcome: Progressing  Goal: Readiness for Transition of Care  Outcome: Progressing     Problem: Wound  Goal: Optimal Coping  Outcome: Progressing  Goal: Optimal Functional Ability  Outcome: Progressing  Goal: Absence of Infection Signs and Symptoms  Outcome: Progressing  Goal: Improved Oral Intake  Outcome: Progressing  Goal: Optimal Pain Control and Function  Outcome: Progressing  Goal: Skin Health and Integrity  Outcome: Progressing  Goal: Optimal Wound Healing  Outcome: Progressing     Problem: Fall Injury Risk  Goal: Absence of Fall and Fall-Related Injury  Outcome: Progressing     Pt is AAO x4, calm and cooperative while lying supine in bed. Pt expressed c/o of pain of their incision as scheduled medications were administered. Reassessment of pain was completed as pt fell asleep and was asleep throughout the shift. Assessment of neck was clean, dry and intact with island dressing. IVs were also assessed bilat as they were clean, dry and intact. Both accordion drains were assessed and they were intact as drainage was measured. Urine output was measured via purewick catheter. At this time family member is at bedside with bed lowered, wheels locked in place, side rails raised x2 with call light in reach. Continue with plan of care.

## 2024-11-21 NOTE — PLAN OF CARE
[facility] - [facility]  Initial Discharge Assessment       Primary Care Provider: No, Primary Doctor    Admission Diagnosis: S/P cervical spinal fusion [Z98.1]  DDD (degenerative disc disease), cervical [M50.30]  S/P spinal fusion [Z98.1]    Admission Date: 11/20/2024  Expected Discharge Date: 11/21/2024    Transition of Care Barriers: None    Payor: MEDICARE / Plan: MEDICARE PART A & B / Product Type: Government /     Extended Emergency Contact Information  Primary Emergency Contact: MikesidPaula  Address: [address] [place] [address] [facility]  Home Phone: [phone]  Mobile Phone: [phone]  Relation: Daughter  Secondary Emergency Contact: Gaudencio Win  Address: [address] [place] [address] [facility]  Home Phone: [phone]  Mobile Phone: [phone]  Relation: Spouse  Preferred language: English   needed? No    Discharge Plan A: Home with family, Home Health  Discharge Plan B: Home with family      [facility] [place], [place] - [address]  [address] [address]  Phone: [phone] Fax: [phone]      Initial Assessment (most recent)       Adult Discharge Assessment - 11/21/24 1039          Discharge Assessment    Assessment Type Discharge Planning Assessment     Confirmed/corrected address, phone number and insurance Yes     Confirmed Demographics Correct on Facesheet     Source of Information patient;family     Communicated KWBAENA with patient/caregiver Yes     People in Home spouse     Do you expect to return to your current living situation? Yes     Do you have help at home or someone to help you manage your care at home? Yes     Who are your caregiver(s) and their phone number(s)? Spouse / Daughter     Prior to hospitilization cognitive status: Alert/Oriented     Current cognitive status: Alert/Oriented     Walking or Climbing Stairs Difficulty no     Dressing/Bathing Difficulty  no     Home Accessibility wheelchair accessible     Home Layout Able to live on 1st floor     Equipment Currently Used at Home grab bar;shower chair     Readmission within 30 days? No     Patient currently being followed by outpatient case management? No     Do you currently have service(s) that help you manage your care at home? No     Do you take prescription medications? Yes     Do you have prescription coverage? Yes     Do you have any problems affording any of your prescribed medications? No     Is the patient taking medications as prescribed? yes     How do you get to doctors appointments? car, drives self;family or friend will provide     Are you on dialysis? No     Do you take coumadin? No     Discharge Plan A Home with family;Home Health     Discharge Plan B Home with family     DME Needed Upon Discharge  walker, rolling     Discharge Plan discussed with: Patient;Adult children     Transition of Care Barriers None                    SW completed discharge planning assessment with the patient and pt's daughter (Paula) at bedside. SW verified demographic information listed on the pt.'s Face sheet. Pt reports living with her spouse in a single story home in [place]. Pt's daughter and spouse plans to help manage pt's care and provide transport upon d/c. Pt agreeable to Home health services. SW to return to bedside to obtain agency name. Pt agreeable to RW being ordered.    Discharge Plan A and Plan B have been determined by review of patient's clinical status, future medical and therapeutic needs, and coverage/benefits for post-acute care in coordination with multidisciplinary team members.    1:12 PM  SW spoke to the pt and pt's spouse at bedside , unsure of HH name. Pt plans to return home in [place] and f/u with PCP to arrange HH near home.      Vesna Love LCSW  Case Management   [facility]   Ext. [phone]

## 2024-11-22 LAB
POCT GLUCOSE: 102 MG/DL (ref 70–110)
POCT GLUCOSE: 161 MG/DL (ref 70–110)

## 2024-11-22 PROCEDURE — 25000003 PHARM REV CODE 250: Performed by: STUDENT IN AN ORGANIZED HEALTH CARE EDUCATION/TRAINING PROGRAM

## 2024-11-22 PROCEDURE — 11000001 HC ACUTE MED/SURG PRIVATE ROOM

## 2024-11-22 RX ORDER — ONDANSETRON 4 MG/1
4 TABLET, ORALLY DISINTEGRATING ORAL EVERY 8 HOURS PRN
Qty: 10 TABLET | Refills: 0 | Status: SHIPPED | OUTPATIENT
Start: 2024-11-22

## 2024-11-22 RX ADMIN — FAMOTIDINE 20 MG: 20 TABLET ORAL at 08:11

## 2024-11-22 RX ADMIN — NITROFURANTOIN MONOHYDRATE/MACROCRYSTALS 100 MG: 25; 75 CAPSULE ORAL at 09:11

## 2024-11-22 RX ADMIN — FAMOTIDINE 20 MG: 20 TABLET ORAL at 09:11

## 2024-11-22 RX ADMIN — METOPROLOL TARTRATE 12.5 MG: 25 TABLET, FILM COATED ORAL at 09:11

## 2024-11-22 RX ADMIN — DOCUSATE SODIUM 100 MG: 100 CAPSULE, LIQUID FILLED ORAL at 08:11

## 2024-11-22 RX ADMIN — METOPROLOL TARTRATE 12.5 MG: 25 TABLET, FILM COATED ORAL at 08:11

## 2024-11-22 RX ADMIN — GABAPENTIN 300 MG: 300 CAPSULE ORAL at 09:11

## 2024-11-22 RX ADMIN — METHOCARBAMOL 1000 MG: 500 TABLET ORAL at 09:11

## 2024-11-22 RX ADMIN — ATORVASTATIN CALCIUM 20 MG: 20 TABLET, FILM COATED ORAL at 08:11

## 2024-11-22 RX ADMIN — METHOCARBAMOL 1000 MG: 500 TABLET ORAL at 03:11

## 2024-11-22 RX ADMIN — POLYETHYLENE GLYCOL 3350 17 G: 17 POWDER, FOR SOLUTION ORAL at 08:11

## 2024-11-22 RX ADMIN — GABAPENTIN 300 MG: 300 CAPSULE ORAL at 08:11

## 2024-11-22 RX ADMIN — GABAPENTIN 300 MG: 300 CAPSULE ORAL at 03:11

## 2024-11-22 RX ADMIN — OXYCODONE HYDROCHLORIDE 10 MG: 10 TABLET ORAL at 03:11

## 2024-11-22 RX ADMIN — NITROFURANTOIN MONOHYDRATE/MACROCRYSTALS 100 MG: 25; 75 CAPSULE ORAL at 08:11

## 2024-11-22 RX ADMIN — METHOCARBAMOL 1000 MG: 500 TABLET ORAL at 08:11

## 2024-11-22 RX ADMIN — OXCARBAZEPINE 150 MG: 150 TABLET, FILM COATED ORAL at 09:11

## 2024-11-22 RX ADMIN — DULOXETINE HYDROCHLORIDE 120 MG: 60 CAPSULE, DELAYED RELEASE ORAL at 08:11

## 2024-11-22 RX ADMIN — MIDODRINE HYDROCHLORIDE 5 MG: 5 TABLET ORAL at 08:11

## 2024-11-22 RX ADMIN — CELECOXIB 100 MG: 100 CAPSULE ORAL at 08:11

## 2024-11-22 RX ADMIN — BUSPIRONE HYDROCHLORIDE 10 MG: 10 TABLET ORAL at 08:11

## 2024-11-22 RX ADMIN — BUSPIRONE HYDROCHLORIDE 10 MG: 10 TABLET ORAL at 09:11

## 2024-11-22 RX ADMIN — MIDODRINE HYDROCHLORIDE 5 MG: 5 TABLET ORAL at 09:11

## 2024-11-22 RX ADMIN — CELECOXIB 100 MG: 100 CAPSULE ORAL at 09:11

## 2024-11-22 RX ADMIN — OXYCODONE HYDROCHLORIDE 10 MG: 10 TABLET ORAL at 06:11

## 2024-11-22 RX ADMIN — DOCUSATE SODIUM 100 MG: 100 CAPSULE, LIQUID FILLED ORAL at 09:11

## 2024-11-22 NOTE — ASSESSMENT & PLAN NOTE
Ana Liliatarik Matos is a 63 y.o. female is s/p Revision C2-T1 PCF 11/20/24    Surgical dressing C/D/I  Pain control: multimodal  PT/OT: WBAT BLE, spine precautions, C collar all times   DVT PPx: FCDs at all times when not ambulating  Abx: postop Ancef x 24hrs   Drain: x2  Silva: None  Nursing: Incentive spirometry, Monitor and record drain output each shift     Dispo: plan to dc pending pain control and drain output

## 2024-11-22 NOTE — CARE UPDATE
Care Update Orthopedic Surgery     Patient went to Xray earlier for C spine films while standing for the xray she got dizzy and fell, reports hitting her head. On exam in her room now she has a slight bilateral headache no areas of bruising or skin abrasions. Dressings in place CDI. C collar in place. Of note the patient reported to me that she was not wearing the C collar when she fell but it is visible on the Xray films which were taken immediately before the fall and radiology reports that she was wearing it. Some mild/moderate pain around the sternum but minimal TTP, no pain in shoulders, hips, knees, or elbows. No new weakness, numbness, or tingling in hands or feet since the fall. CN II-XII intact. We will keep her inpatient overnight to watch her and plan for discharge home tomorrow if she is doing well.    Donis Granados PGY-4  Orthopedic Surgery Resident

## 2024-11-22 NOTE — SUBJECTIVE & OBJECTIVE
"Principal Problem:S/P cervical spinal fusion    Principal Orthopedic Problem: Same    Interval History: MARIUSZ, patient stable, pain controlled. No acute complaints this morning. C collar in place dressings were clean and dry, drains in place     Review of patient's allergies indicates:   Allergen Reactions    Aspirin Palpitations       Current Facility-Administered Medications   Medication    0.9%  NaCl infusion    atorvastatin tablet 20 mg    bisacodyL suppository 10 mg    busPIRone tablet 10 mg    calcium carbonate 200 mg calcium (500 mg) chewable tablet 500 mg    celecoxib capsule 100 mg    dextrose 10% bolus 125 mL 125 mL    dextrose 10% bolus 250 mL 250 mL    docusate sodium capsule 100 mg    DULoxetine DR capsule 120 mg    famotidine tablet 20 mg    gabapentin capsule 300 mg    glucagon (human recombinant) injection 1 mg    glucose chewable tablet 16 g    glucose chewable tablet 24 g    HYDROmorphone (PF) injection Syrg 0.5 mg    insulin aspart U-100 pen 0-5 Units    LORazepam tablet 1 mg    melatonin tablet 6 mg    methocarbamoL tablet 1,000 mg    metoprolol tartrate (LOPRESSOR) split tablet 12.5 mg    midodrine tablet 5 mg    nitrofurantoin (macrocrystal-monohydrate) 100 MG capsule 100 mg    ondansetron disintegrating tablet 8 mg    OXcarbazepine tablet 150 mg    oxyCODONE immediate release tablet 5 mg    oxyCODONE immediate release tablet Tab 10 mg    polyethylene glycol packet 17 g    promethazine tablet 25 mg    scopolamine 1.3-1.5 mg (1 mg over 3 days) 1 patch    traZODone tablet 100 mg     Objective:     Vital Signs (Most Recent):  Temp: 97.6 °F (36.4 °C) (11/22/24 0510)  Pulse: 71 (11/22/24 0510)  Resp: 18 (11/22/24 0604)  BP: 136/67 (11/22/24 0510)  SpO2: 95 % (11/22/24 0510) Vital Signs (24h Range):  Temp:  [97.5 °F (36.4 °C)-97.8 °F (36.6 °C)] 97.6 °F (36.4 °C)  Pulse:  [62-80] 71  Resp:  [16-20] 18  SpO2:  [92 %-96 %] 95 %  BP: (103-149)/(62-75) 136/67     Weight: 63.5 kg (140 lb)  Height: 5' 6" " (167.6 cm)  Body mass index is 22.6 kg/m².      Intake/Output Summary (Last 24 hours) at 11/22/2024 0751  Last data filed at 11/22/2024 0400  Gross per 24 hour   Intake 910 ml   Output 960 ml   Net -50 ml        Ortho/SPM Exam  Bilateral upper extremities SILT   AIN/PIN/Ulnar nerves intact   2+ radial and ulnar arteries bilaterally   Dressings CDI   Drains 2  C collar in place       Significant Labs: All pertinent labs within the past 24 hours have been reviewed.    Significant Imaging: I have reviewed and interpreted all pertinent imaging results/findings.

## 2024-11-22 NOTE — PLAN OF CARE
[facility]    HOME HEALTH ORDERS  FACE TO FACE ENCOUNTER    Patient Name: Ana Lilia Matos  YOB: 1960    PCP: Priti, Primary Doctor   PCP Address: None  PCP Phone Number: None  PCP Fax: None    Encounter Date: 11/22/2024    Admit to Home Health    Diagnoses:  Active Hospital Problems    Diagnosis  POA    *Revision C2-T1 PCF 11/20/24 [Z98.1]  Not Applicable      Resolved Hospital Problems   No resolved problems to display.       No future appointments.        I have seen and examined this patient face to face today. My clinical findings that support the need for the home health skilled services and home bound status are the following:  Weakness/numbness causing balance and gait disturbance due to Surgery making it taxing to leave home.    Allergies:  Review of patient's allergies indicates:   Allergen Reactions    Aspirin Palpitations       Diet: regular diet    Activities: activity as tolerated, Spine Precautions, C Collar all times for 6 weeks     Nursing:   SN to complete comprehensive assessment including routine vital signs. Instruct on disease process and s/s of complications to report to MD. Follow specific home health arthoplasty protocol. Review/verify medication list sent home with the patient at time of discharge  and instruct patient/caregiver as needed. If coumadin ordered, coumadin clinic to manage INR with INR draws 2x per week with a goal to maintain INR between 1.8 and 2.2. Frequency may be adjusted depending on start of care date.    Notify MD if SBP > 160 or < 90; DBP > 90 or < 50; HR > 120 or < 50; Temp > 101    Home Medical Equipment:  Walker, 3-1 bedside commode, transfer tub bench    CONSULTS:    Physical Therapy to evaluate and treat. Evaluate for home safety and equipment needs; Establish/upgrade home exercise program. Perform / instruct on therapeutic exercises, gait training, transfer training, and Range of Motion.    WOUND CARE ORDERS  You may remove your dressing  and shower 7 days after surgery. Until then please keep your wound clean and dry. Sponge baths are acceptable. Do not go in a pool or hot tub until seen in clinic. Please leave the small steri-strips covering your wound in place until they fall off naturally (2 weeks). You may notice clear suture ends hanging from the sides of your incision after the steri-strips are removed, it is ok to clip these with scissors.        Medications: Review discharge medications with patient and family and provide education.      Current Discharge Medication List        START taking these medications    Details   !! acetaminophen (TYLENOL) 325 MG tablet Take 2 tablets (650 mg total) by mouth every 6 (six) hours as needed for Pain.  Qty: 40 tablet, Refills: 0      famotidine (PEPCID) 20 MG tablet Take 1 tablet (20 mg total) by mouth 2 (two) times daily.  Qty: 60 tablet, Refills: 0      gabapentin (NEURONTIN) 300 MG capsule Take 1 capsule (300 mg total) by mouth 3 (three) times daily.  Qty: 90 capsule, Refills: 0      methocarbamoL (ROBAXIN) 750 MG Tab Take 1 tablet (750 mg total) by mouth 4 (four) times daily.  Qty: 40 tablet, Refills: 0      !! ondansetron (ZOFRAN-ODT) 4 MG TbDL Take 2 tablets (8 mg total) by mouth every 8 (eight) hours as needed.  Qty: 10 tablet, Refills: 0      oxyCODONE (ROXICODONE) 5 MG immediate release tablet Take 1 tablet (5 mg total) by mouth every 8 (eight) hours as needed for Pain.  Qty: 25 tablet, Refills: 0    Comments: Quantity prescribed more than 7 day supply? No  Associated Diagnoses: S/P cervical spinal fusion      senna-docusate 8.6-50 mg (PERICOLACE) 8.6-50 mg per tablet Take 1 tablet by mouth 2 (two) times daily. for 14 days  Qty: 28 tablet, Refills: 0       !! - Potential duplicate medications found. Please discuss with provider.        CONTINUE these medications which have NOT CHANGED    Details   !! acetaminophen (TYLENOL EXTRA STRENGTH) 500 MG tablet Take 500 mg by mouth every 6 (six) hours as  needed.      anastrozole (ARIMIDEX) 1 mg Tab Take 1 mg by mouth once daily.      atorvastatin (LIPITOR) 20 MG tablet Take 20 mg by mouth once daily.      busPIRone (BUSPAR) 10 MG tablet Take 10 mg by mouth 2 (two) times daily.      calcium carbonate-vitamin D3 (CALCIUM 500 + D) 500 mg-10 mcg (400 unit) Tab Take 1 tablet by mouth Daily.      cranberry conc-ascorbic acid 4,200-20 mg Cap Take 1 capsule by mouth Daily.      DULoxetine (CYMBALTA) 20 MG capsule Take 120 mg by mouth once daily. SAID SHE TAKES 120 MG  IN THE AM      fexofenadine-pseudoephedrine  mg (ALLEGRA-D)  mg per tablet Take 1 tablet by mouth nightly.      fluticasone propionate (FLONASE) 50 mcg/actuation nasal spray 2 sprays by Each Nostril route once daily.      LORazepam (ATIVAN) 1 MG tablet Take 1 mg by mouth 3 (three) times daily.      metoprolol tartrate (LOPRESSOR) 25 MG tablet Take 12.5 mg by mouth 2 (two) times daily.      midodrine (PROAMATINE) 5 MG Tab Take 5 mg by mouth 2 (two) times daily.      nitrofurantoin, macrocrystal-monohydrate, (MACROBID) 100 MG capsule Take 100 mg by mouth every 12 (twelve) hours.      !! ondansetron (ZOFRAN-ODT) 4 MG TbDL Take 4 mg by mouth every 8 (eight) hours as needed.      OXcarbazepine (TRILEPTAL) 150 MG Tab Take 150 mg by mouth 2 (two) times daily.      pantoprazole (PROTONIX) 40 MG tablet Take 40 mg by mouth once daily.      teriparatide (FORTEO) 20 mcg/dose (600mcg/2.4mL) PnIj Inject 0.08 mLs (20 mcg total) into the skin once daily.  Qty: 2.4 mL, Refills: 11    Comments: Plan prefers Brand Forteo. Prior authorization obtained was for Brand Forteo.  Associated Diagnoses: Osteoporosis, unspecified osteoporosis type, unspecified pathological fracture presence      tiZANidine (ZANAFLEX) 4 MG tablet Take 1 tablet (4 mg total) by mouth every 8 (eight) hours as needed.  Qty: 60 tablet, Refills: 2      traZODone (DESYREL) 100 MG tablet Take 100 mg by mouth nightly.      albuterol (PROVENTIL/VENTOLIN  HFA) 90 mcg/actuation inhaler Inhale 2 puffs into the lungs every 6 (six) hours as needed.       !! - Potential duplicate medications found. Please discuss with provider.          I certify that this patient is confined to her home and needs physical therapy and occupational therapy.

## 2024-11-22 NOTE — SUBJECTIVE & OBJECTIVE
"Principal Problem:S/P cervical spinal fusion    Principal Orthopedic Problem: Same    Interval History: MARIUSZ, patient stable, pain controlled. No acute complaints this morning. Dressings CDI    Review of patient's allergies indicates:   Allergen Reactions    Aspirin Palpitations       Current Facility-Administered Medications   Medication    0.9%  NaCl infusion    atorvastatin tablet 20 mg    bisacodyL suppository 10 mg    busPIRone tablet 10 mg    calcium carbonate 200 mg calcium (500 mg) chewable tablet 500 mg    celecoxib capsule 100 mg    dextrose 10% bolus 125 mL 125 mL    dextrose 10% bolus 250 mL 250 mL    docusate sodium capsule 100 mg    DULoxetine DR capsule 120 mg    famotidine tablet 20 mg    gabapentin capsule 300 mg    glucagon (human recombinant) injection 1 mg    glucose chewable tablet 16 g    glucose chewable tablet 24 g    HYDROmorphone (PF) injection Syrg 0.5 mg    insulin aspart U-100 pen 0-5 Units    LORazepam tablet 1 mg    melatonin tablet 6 mg    methocarbamoL tablet 1,000 mg    metoprolol tartrate (LOPRESSOR) split tablet 12.5 mg    midodrine tablet 5 mg    nitrofurantoin (macrocrystal-monohydrate) 100 MG capsule 100 mg    ondansetron disintegrating tablet 8 mg    OXcarbazepine tablet 150 mg    oxyCODONE immediate release tablet 5 mg    oxyCODONE immediate release tablet Tab 10 mg    polyethylene glycol packet 17 g    promethazine tablet 25 mg    scopolamine 1.3-1.5 mg (1 mg over 3 days) 1 patch    traZODone tablet 100 mg     Objective:     Vital Signs (Most Recent):  Temp: 97.6 °F (36.4 °C) (11/22/24 0510)  Pulse: 71 (11/22/24 0510)  Resp: 18 (11/22/24 0604)  BP: 136/67 (11/22/24 0510)  SpO2: 95 % (11/22/24 0510) Vital Signs (24h Range):  Temp:  [97.5 °F (36.4 °C)-97.8 °F (36.6 °C)] 97.6 °F (36.4 °C)  Pulse:  [62-80] 71  Resp:  [16-20] 18  SpO2:  [92 %-96 %] 95 %  BP: (103-149)/(62-75) 136/67     Weight: 63.5 kg (140 lb)  Height: 5' 6" (167.6 cm)  Body mass index is 22.6 " kg/m².      Intake/Output Summary (Last 24 hours) at 11/22/2024 0746  Last data filed at 11/22/2024 0400  Gross per 24 hour   Intake 910 ml   Output 960 ml   Net -50 ml        Ortho/SPM Exam  Bilateral upper extremities SILT   AIN/PIN/Ulnar nerves intact   2+ radial and ulnar arteries bilaterally   Dressings CDI   Drains x2  C collar in place       Significant Labs:   Recent Lab Results  (Last 5 results in the past 24 hours)        11/22/24  0717   11/21/24  2112   11/21/24  1540   11/21/24  1128   11/21/24  1001        Albumin         3.4       ALP         43       ALT         10       Anion Gap         5       AST         13       BILIRUBIN TOTAL         0.3  Comment: For infants and newborns, interpretation of results should be based  on gestational age, weight and in agreement with clinical  observations.    Premature Infant recommended reference ranges:  Up to 24 hours.............<8.0 mg/dL  Up to 48 hours............<12.0 mg/dL  3-5 days..................<15.0 mg/dL  6-29 days.................<15.0 mg/dL         BUN         4       Calcium         8.4       Chloride         105       CO2         25       Creatinine         0.8       eGFR         >60.0       Glucose         122       POCT Glucose 102   121   153   110         Potassium         4.0       PROTEIN TOTAL         6.0       Sodium         135                            All pertinent labs within the past 24 hours have been reviewed.    Significant Imaging: I have reviewed and interpreted all pertinent imaging results/findings.

## 2024-11-22 NOTE — NURSING
Attending team is at the bedside to evaluate.  Discharge has been cancelled.   Dr. Granados answered the patient and her husbands questions.  Patient and her spouse voiced being very please with staying overnight to be observed.   She remains aaox4.

## 2024-11-22 NOTE — PROGRESS NOTES
[facility]  Orthopedics  Progress Note    Patient Name: Ana Lilia Matos  MRN: [id number]  Admission Date: 11/20/2024  Hospital Length of Stay: 2 days  Attending Provider: Marlon Cruz MD  Primary Care Provider: Priti, Primary Doctor  Follow-up For: Procedure(s) (LRB):  FUSION, SPINE, CERVICAL, POSTERIOR APPROACH **LOOP X** C2-T1 REVISION DEPUY SNS: MOTORS/SSEP/EMG 3500 BED, METAL ANDERSEN, HEAD TO CORE (N/A)    Post-Operative Day: 2 Days Post-Op  Subjective:     Principal Problem:S/P cervical spinal fusion    Principal Orthopedic Problem: Same    Interval History: NAEON, patient stable, pain controlled. No acute complaints this morning. Dressings CDI    Review of patient's allergies indicates:   Allergen Reactions    Aspirin Palpitations       Current Facility-Administered Medications   Medication    0.9%  NaCl infusion    atorvastatin tablet 20 mg    bisacodyL suppository 10 mg    busPIRone tablet 10 mg    calcium carbonate 200 mg calcium (500 mg) chewable tablet 500 mg    celecoxib capsule 100 mg    dextrose 10% bolus 125 mL 125 mL    dextrose 10% bolus 250 mL 250 mL    docusate sodium capsule 100 mg    DULoxetine DR capsule 120 mg    famotidine tablet 20 mg    gabapentin capsule 300 mg    glucagon (human recombinant) injection 1 mg    glucose chewable tablet 16 g    glucose chewable tablet 24 g    HYDROmorphone (PF) injection Syrg 0.5 mg    insulin aspart U-100 pen 0-5 Units    LORazepam tablet 1 mg    melatonin tablet 6 mg    methocarbamoL tablet 1,000 mg    metoprolol tartrate (LOPRESSOR) split tablet 12.5 mg    midodrine tablet 5 mg    nitrofurantoin (macrocrystal-monohydrate) 100 MG capsule 100 mg    ondansetron disintegrating tablet 8 mg    OXcarbazepine tablet 150 mg    oxyCODONE immediate release tablet 5 mg    oxyCODONE immediate release tablet Tab 10 mg    polyethylene glycol packet 17 g    promethazine tablet 25 mg    scopolamine 1.3-1.5 mg (1 mg over 3 days) 1 patch    traZODone tablet 100 mg  "    Objective:     Vital Signs (Most Recent):  Temp: 97.6 °F (36.4 °C) (11/22/24 0510)  Pulse: 71 (11/22/24 0510)  Resp: 18 (11/22/24 0604)  BP: 136/67 (11/22/24 0510)  SpO2: 95 % (11/22/24 0510) Vital Signs (24h Range):  Temp:  [97.5 °F (36.4 °C)-97.8 °F (36.6 °C)] 97.6 °F (36.4 °C)  Pulse:  [62-80] 71  Resp:  [16-20] 18  SpO2:  [92 %-96 %] 95 %  BP: (103-149)/(62-75) 136/67     Weight: 63.5 kg (140 lb)  Height: 5' 6" (167.6 cm)  Body mass index is 22.6 kg/m².      Intake/Output Summary (Last 24 hours) at 11/22/2024 0746  Last data filed at 11/22/2024 0400  Gross per 24 hour   Intake 910 ml   Output 960 ml   Net -50 ml        Ortho/SPM Exam  Bilateral upper extremities SILT   AIN/PIN/Ulnar nerves intact   2+ radial and ulnar arteries bilaterally   Dressings CDI   Drains x2  C collar in place       Significant Labs:   Recent Lab Results  (Last 5 results in the past 24 hours)        11/22/24  0717   11/21/24  2112   11/21/24  1540   11/21/24  1128   11/21/24  1001        Albumin         3.4       ALP         43       ALT         10       Anion Gap         5       AST         13       BILIRUBIN TOTAL         0.3  Comment: For infants and newborns, interpretation of results should be based  on gestational age, weight and in agreement with clinical  observations.    Premature Infant recommended reference ranges:  Up to 24 hours.............<8.0 mg/dL  Up to 48 hours............<12.0 mg/dL  3-5 days..................<15.0 mg/dL  6-29 days.................<15.0 mg/dL         BUN         4       Calcium         8.4       Chloride         105       CO2         25       Creatinine         0.8       eGFR         >60.0       Glucose         122       POCT Glucose 102   121   153   110         Potassium         4.0       PROTEIN TOTAL         6.0       Sodium         135                            All pertinent labs within the past 24 hours have been reviewed.    Significant Imaging: I have reviewed and interpreted all " pertinent imaging results/findings.  Assessment/Plan:     * Revision C2-T1 PCF 11/20/24  Ana Lilia Matos is a 63 y.o. female is s/p Revision C2-T1 PCF 11/20/24    Surgical dressing C/D/I  Pain control: multimodal  PT/OT: WBAT BLE, spine precautions, C collar all times   DVT PPx: FCDs at all times when not ambulating  Abx: postop Ancef x 24hrs   Drain: x2  Silva: None  Nursing: Incentive spirometry, Monitor and record drain output each shift     Dispo: plan to dc pending pain control and drain output    Output by Drain (mL) 11/20/24 0701 - 11/20/24 1900 11/20/24 1901 - 11/21/24 0700 11/21/24 0701 - 11/21/24 1900 11/21/24 1901 - 11/22/24 0700 11/22/24 0701 - 11/22/24 0747        Closed/Suction Drain 11/20/24 1022 Posterior Neck Accordion 10 Fr. 0  10 15         Closed/Suction Drain 11/20/24 1023 Posterior;Right Neck Accordion 1 Fr. 5 5 80 30                 Donis Granados MD  Orthopedics  [facility] - Surgery

## 2024-11-22 NOTE — DISCHARGE SUMMARY
"[facility] - Surgery  Orthopedics  Discharge Summary      Patient Name: Ana Lilia Matos  MRN: [id number]  Admission Date: 11/20/2024  Hospital Length of Stay: 2 days  Discharge Date and Time: No discharge date for patient encounter.  Attending Physician: Marlon Cruz MD   Discharging Provider: Donis Granados MD  Primary Care Provider: Priti, Primary Doctor    HPI:   Ana Lilia Matos is a 63 y.o. female who returns to me today for CT review. She reports that anytime she performs ADLs/use her arms she has a tightness in her neck and shoulder. She reports that she has been having "electrical impulses" going down from her neck to her left shoulder and anterior chest wall and that her hands will periodically go numb. She reports that the numbness in her hands alternates and is diffuse in nature. She reports episodes of dizziness also. She reports she has been evaluated by a neurologist with no relief of her symptoms related to her dizziness.      The patient does not smoke or endorse IVDU. She has DM (HA1C of 5.8). The patient is not on any blood thinners and does not take chronic narcotics. She is disabled due to her neck. She was accompanied by her 2 daughters, and they drove 5 hours from [place].       Procedure(s) (LRB):  FUSION, SPINE, CERVICAL, POSTERIOR APPROACH **LOOP X** C2-T1 REVISION DEPUY SNS: MOTORS/SSEP/EMG 3500 BED, [facility], HEAD TO CORE (N/A)      Hospital Course:  On 11/19/2024, the patient arrived to the [facility] OR for proper pre-operative management.  Upon completion of pre-operative preparation, the patient was taken back to the operative theatre. Revision posterior cervical fusion was performed without complication and the patient was transported to the post anesthesia care unit in stable condition.  After appropriate recovery from the anaesthetic agents used during the surgery, the patient was then transported to the hospital inpatient floor.  The interim of the hospital stay from arrival on " "the floor up to discharge has been uncomplicated. The patient has tolerated regular diet.  The patient's pain has been controlled using a multimodal approach. Currently, the patient's pain is well controlled on an oral regimen.  The patient has been voiding without difficulty.  The patient began participation in physical therapy after surgery and has progressed throughout the entire hospital stay.  Currently, the patient's progress is sufficient to allow the them to be discharged to home safely.  The patient agrees with this assessment and desires a discharge today.      Goals of Care Treatment Preferences:  Code Status: Full Code      Consults (From admission, onward)          Status Ordering Provider     IP consult case management/social work  Once        Provider:  (Not yet assigned)    Acknowledged FRANCESCO DAMON            Significant Diagnostic Studies: No pertinent studies.    Pending Diagnostic Studies:       Procedure Component Value Units Date/Time    X-Ray Cervical Spine 2 or 3 Views [[id number]]     Order Status: Sent Lab Status: No result           Final Active Diagnoses:    Diagnosis Date Noted POA    PRINCIPAL PROBLEM:  Revision C2-T1 PCF 11/20/24 [Z98.1] 11/19/2024 Not Applicable      Problems Resolved During this Admission:      Discharged Condition: good    Disposition: Home or Self Care    Follow Up:    Patient Instructions:      WALKER FOR HOME USE     Order Specific Question Answer Comments   Type of Walker: Adult (5'4"-6'6")    With wheels? Yes    Height: 5' 6" (1.676 m)    Weight: 63.5 kg (140 lb)    Length of need (1-99 months): 99    Please check all that apply: Patient needs help to get in and out of chair.    Please check all that apply: Walker will be used for gait training.    Please check all that apply: Patient is unable to safely ambulate without equipment.      Diet general     Call MD for:  temperature >100.4     Call MD for:  persistent nausea and vomiting     Call MD for:  " severe uncontrolled pain     Call MD for:  difficulty breathing, headache or visual disturbances     Call MD for:  redness, tenderness, or signs of infection (pain, swelling, redness, odor or green/yellow discharge around incision site)     Call MD for:  hives     Call MD for:  persistent dizziness or light-headedness     Call MD for:  extreme fatigue     Activity as tolerated     Medications:  Reconciled Home Medications:      Medication List        START taking these medications      famotidine 20 MG tablet  Commonly known as: PEPCID  Take 1 tablet (20 mg total) by mouth 2 (two) times daily.     gabapentin 300 MG capsule  Commonly known as: NEURONTIN  Take 1 capsule (300 mg total) by mouth 3 (three) times daily.     methocarbamoL 750 MG Tab  Commonly known as: ROBAXIN  Take 1 tablet (750 mg total) by mouth 4 (four) times daily.     oxyCODONE 5 MG immediate release tablet  Commonly known as: ROXICODONE  Take 1 tablet (5 mg total) by mouth every 8 (eight) hours as needed for Pain.     senna-docusate 8.6-50 mg 8.6-50 mg per tablet  Commonly known as: PERICOLACE  Take 1 tablet by mouth 2 (two) times daily. for 14 days            CHANGE how you take these medications      acetaminophen 325 MG tablet  Commonly known as: TYLENOL  Take 2 tablets (650 mg total) by mouth every 6 (six) hours as needed for Pain.  What changed:   medication strength  how much to take  reasons to take this     * ondansetron 4 MG Tbdl  Commonly known as: ZOFRAN-ODT  Take 2 tablets (8 mg total) by mouth every 8 (eight) hours as needed.  What changed: You were already taking a medication with the same name, and this prescription was added. Make sure you understand how and when to take each.     * ondansetron 4 MG Tbdl  Commonly known as: ZOFRAN-ODT  Take 1 tablet (4 mg total) by mouth every 8 (eight) hours as needed.  What changed: Another medication with the same name was added. Make sure you understand how and when to take each.           * This  list has 2 medication(s) that are the same as other medications prescribed for you. Read the directions carefully, and ask your doctor or other care provider to review them with you.                CONTINUE taking these medications      albuterol 90 mcg/actuation inhaler  Commonly known as: PROVENTIL/VENTOLIN HFA  Inhale 2 puffs into the lungs every 6 (six) hours as needed.     anastrozole 1 mg Tab  Commonly known as: ARIMIDEX  Take 1 mg by mouth once daily.     atorvastatin 20 MG tablet  Commonly known as: LIPITOR  Take 20 mg by mouth once daily.     busPIRone 10 MG tablet  Commonly known as: BUSPAR  Take 10 mg by mouth 2 (two) times daily.     CALCIUM 500 + D 500 mg-10 mcg (400 unit) Tab  Generic drug: calcium carbonate-vitamin D3  Take 1 tablet by mouth Daily.     cranberry conc-ascorbic acid 4,200-20 mg Cap  Take 1 capsule by mouth Daily.     DULoxetine 20 MG capsule  Commonly known as: CYMBALTA  Take 120 mg by mouth once daily. SAID SHE TAKES 120 MG  IN THE AM     fexofenadine-pseudoephedrine  mg  mg per tablet  Commonly known as: ALLEGRA-D  Take 1 tablet by mouth nightly.     fluticasone propionate 50 mcg/actuation nasal spray  Commonly known as: FLONASE  2 sprays by Each Nostril route once daily.     LORazepam 1 MG tablet  Commonly known as: ATIVAN  Take 1 mg by mouth 3 (three) times daily.     metoprolol tartrate 25 MG tablet  Commonly known as: LOPRESSOR  Take 12.5 mg by mouth 2 (two) times daily.     midodrine 5 MG Tab  Commonly known as: PROAMATINE  Take 5 mg by mouth 2 (two) times daily.     nitrofurantoin (macrocrystal-monohydrate) 100 MG capsule  Commonly known as: MACROBID  Take 100 mg by mouth every 12 (twelve) hours.     OXcarbazepine 150 MG Tab  Commonly known as: TRILEPTAL  Take 150 mg by mouth 2 (two) times daily.     pantoprazole 40 MG tablet  Commonly known as: PROTONIX  Take 40 mg by mouth once daily.     teriparatide 20 mcg/dose (600mcg/2.4mL) Pnij  Commonly known as:  FORTEO  Inject 0.08 mLs (20 mcg total) into the skin once daily.     tiZANidine 4 MG tablet  Commonly known as: ZANAFLEX  Take 1 tablet (4 mg total) by mouth every 8 (eight) hours as needed.  Notes to patient: Hold while on Robaxin     traZODone 100 MG tablet  Commonly known as: DESYREL  Take 100 mg by mouth nightly.              Donis Granados MD  Orthopedics  [facility] - Surgery

## 2024-11-22 NOTE — PLAN OF CARE
[facility]  Discharge Final Note    Primary Care Provider: No, Primary Doctor    Expected Discharge Date: 11/19/2024    Final Discharge Note (most recent)       Final Note - 11/22/24 1338          Final Note    Assessment Type Final Discharge Note     Anticipated Discharge Disposition Home-Health Care [facility]     What phone number can be called within the next 1-3 days to see how you are doing after discharge? --   [phone]       Post-Acute Status    Post-Acute Authorization Home Health                     Important Message from Medicare           Patient discharged home to care of family on 11/22/24.    Gretchen Fabian RNCM  Case Management  [facility]  [phone]

## 2024-11-22 NOTE — NURSING
I received a call from x ray informing me that while standing for spinal x ray this patient passed out and fell to the floor.It is reported to me that the patient hit her head on something when falling.   The x ray tech reports that after a few seconds she was aaox4.   I notified Dr. Granados of the event as radiology did not notify the doctor.   Upon arriving back to her room I measured and recorded her vitals and did basic neuro checks.   She is aaox4 at this time.   Dr. Granados spoke with Dr. Graham and Dr. Graham will be coming to assess her.   At this sign there are no signs of bleeding or signs of distress.

## 2024-11-22 NOTE — PROGRESS NOTES
[facility]  Orthopedics  Progress Note    Patient Name: Ana Lilia Matos  MRN: [id number]  Admission Date: 11/20/2024  Hospital Length of Stay: 1 days  Attending Provider: Marlon Cruz MD  Primary Care Provider: Priti, Primary Doctor  Follow-up For: Procedure(s) (LRB):  FUSION, SPINE, CERVICAL, POSTERIOR APPROACH **LOOP X** C2-T1 REVISION DEPUY SNS: MOTORS/SSEP/EMG 3500 BED, METAL ANDERSEN, HEAD TO CORE (N/A)    Post-Operative Day: 1 Days Post-Op  Subjective:     Principal Problem:S/P cervical spinal fusion    Principal Orthopedic Problem: Same    Interval History: NAEON, patient stable, pain controlled. No acute complaints this morning. C collar in place dressings were clean and dry, drains in place     Review of patient's allergies indicates:   Allergen Reactions    Aspirin Palpitations       Current Facility-Administered Medications   Medication    0.9%  NaCl infusion    atorvastatin tablet 20 mg    bisacodyL suppository 10 mg    busPIRone tablet 10 mg    calcium carbonate 200 mg calcium (500 mg) chewable tablet 500 mg    celecoxib capsule 100 mg    dextrose 10% bolus 125 mL 125 mL    dextrose 10% bolus 250 mL 250 mL    docusate sodium capsule 100 mg    DULoxetine DR capsule 120 mg    famotidine tablet 20 mg    gabapentin capsule 300 mg    glucagon (human recombinant) injection 1 mg    glucose chewable tablet 16 g    glucose chewable tablet 24 g    HYDROmorphone (PF) injection Syrg 0.5 mg    insulin aspart U-100 pen 0-5 Units    LORazepam tablet 1 mg    melatonin tablet 6 mg    methocarbamoL tablet 1,000 mg    metoprolol tartrate (LOPRESSOR) split tablet 12.5 mg    midodrine tablet 5 mg    nitrofurantoin (macrocrystal-monohydrate) 100 MG capsule 100 mg    ondansetron disintegrating tablet 8 mg    OXcarbazepine tablet 150 mg    oxyCODONE immediate release tablet 5 mg    oxyCODONE immediate release tablet Tab 10 mg    polyethylene glycol packet 17 g    promethazine tablet 25 mg    scopolamine 1.3-1.5 mg (1  mg over 3 days) 1 patch    traZODone tablet 100 mg     Objective:       Ortho/SPM Exam  Bilateral upper extremities SILT   AIN/PIN/Ulnar nerves intact   2+ radial and ulnar arteries bilaterally   Dressings CDI   Drains 2  C collar in place       Significant Labs: All pertinent labs within the past 24 hours have been reviewed.    Significant Imaging: I have reviewed and interpreted all pertinent imaging results/findings.  Assessment/Plan:     * Revision C2-T1 PCF 11/20/24  Ana Lilia Matos is a 63 y.o. female is s/p Revision C2-T1 PCF 11/20/24    Surgical dressing C/D/I  Pain control: multimodal  PT/OT: WBAT BLE, spine precautions, C collar all times   DVT PPx: FCDs at all times when not ambulating  Abx: postop Ancef x 24hrs   Drain: x2  Silva: None  Nursing: Incentive spirometry, Monitor and record drain output each shift     Dispo: plan to dc pending pain control and drain output              Donis Granados MD  Orthopedics  [facility] - Surgery

## 2024-11-22 NOTE — PLAN OF CARE
Problem: Adult Inpatient Plan of Care  Goal: Plan of Care Review  Outcome: Progressing  Goal: Patient-Specific Goal (Individualized)  Outcome: Progressing  Goal: Absence of Hospital-Acquired Illness or Injury  Outcome: Progressing  Goal: Optimal Comfort and Wellbeing  Outcome: Progressing  Goal: Readiness for Transition of Care  Outcome: Progressing     Problem: Wound  Goal: Optimal Coping  Outcome: Progressing  Goal: Optimal Functional Ability  Outcome: Progressing  Goal: Absence of Infection Signs and Symptoms  Outcome: Progressing  Goal: Improved Oral Intake  Outcome: Progressing  Goal: Optimal Pain Control and Function  Outcome: Progressing  Goal: Skin Health and Integrity  Outcome: Progressing  Goal: Optimal Wound Healing  Outcome: Progressing     Problem: Fall Injury Risk  Goal: Absence of Fall and Fall-Related Injury  Outcome: Progressing     Problem: Skin Injury Risk Increased  Goal: Skin Health and Integrity  Outcome: Progressing    Pt is calm and cooperative while lying supine in bed. C-collar and drains are intact and output has been documented accordingly.  Risk for falls was acknowledged as bed is lowered, wheels locked in place, side rails raised x2, with call light within reach. Continue with plan of care

## 2024-11-22 NOTE — PLAN OF CARE
Problem: Adult Inpatient Plan of Care  Goal: Plan of Care Review  Outcome: Progressing  Goal: Patient-Specific Goal (Individualized)  Outcome: Progressing  Goal: Absence of Hospital-Acquired Illness or Injury  Outcome: Progressing  Goal: Optimal Comfort and Wellbeing  Outcome: Progressing  Goal: Readiness for Transition of Care  Outcome: Progressing  Problem: Fall Injury Risk  Goal: Absence of Fall and Fall-Related Injury  Outcome: Progressing  Problem: Skin Injury Risk Increased  Goal: Skin Health and Integrity  Outcome: Progressing     Pt resting in bed comfortably. Sat up in chair for meals. C-collar and hemovac drains in place. PIV line intact and infusing, free of infection and irritation. Fall precautions maintained, no falls noted. Call light within reach, bed locked and in lowest position. Non-skid socks on while out of bed. Patient instructed to call for assistance. Skin integrity maintained as patient is independent with frequent repositioning. C/o pain, managed with PRN meds, no other complaints or concerns. Progressing towards goals. Plan of care ongoing.

## 2024-11-23 LAB
POCT GLUCOSE: 109 MG/DL (ref 70–110)
POCT GLUCOSE: 113 MG/DL (ref 70–110)
POCT GLUCOSE: 128 MG/DL (ref 70–110)
POCT GLUCOSE: 128 MG/DL (ref 70–110)

## 2024-11-23 PROCEDURE — 25000003 PHARM REV CODE 250: Performed by: STUDENT IN AN ORGANIZED HEALTH CARE EDUCATION/TRAINING PROGRAM

## 2024-11-23 PROCEDURE — 25000003 PHARM REV CODE 250: Performed by: SURGERY

## 2024-11-23 PROCEDURE — 97116 GAIT TRAINING THERAPY: CPT

## 2024-11-23 PROCEDURE — 11000001 HC ACUTE MED/SURG PRIVATE ROOM

## 2024-11-23 RX ADMIN — METOPROLOL TARTRATE 12.5 MG: 25 TABLET, FILM COATED ORAL at 08:11

## 2024-11-23 RX ADMIN — NITROFURANTOIN MONOHYDRATE/MACROCRYSTALS 100 MG: 25; 75 CAPSULE ORAL at 08:11

## 2024-11-23 RX ADMIN — GABAPENTIN 300 MG: 300 CAPSULE ORAL at 08:11

## 2024-11-23 RX ADMIN — OXYCODONE HYDROCHLORIDE 10 MG: 10 TABLET ORAL at 07:11

## 2024-11-23 RX ADMIN — GABAPENTIN 300 MG: 300 CAPSULE ORAL at 03:11

## 2024-11-23 RX ADMIN — CELECOXIB 100 MG: 100 CAPSULE ORAL at 08:11

## 2024-11-23 RX ADMIN — MIDODRINE HYDROCHLORIDE 5 MG: 5 TABLET ORAL at 08:11

## 2024-11-23 RX ADMIN — TRAZODONE HYDROCHLORIDE 100 MG: 100 TABLET ORAL at 08:11

## 2024-11-23 RX ADMIN — POLYETHYLENE GLYCOL 3350 17 G: 17 POWDER, FOR SOLUTION ORAL at 08:11

## 2024-11-23 RX ADMIN — FAMOTIDINE 20 MG: 20 TABLET ORAL at 08:11

## 2024-11-23 RX ADMIN — BUSPIRONE HYDROCHLORIDE 10 MG: 10 TABLET ORAL at 08:11

## 2024-11-23 RX ADMIN — METHOCARBAMOL 1000 MG: 500 TABLET ORAL at 03:11

## 2024-11-23 RX ADMIN — OXYCODONE HYDROCHLORIDE 10 MG: 10 TABLET ORAL at 12:11

## 2024-11-23 RX ADMIN — OXCARBAZEPINE 150 MG: 150 TABLET, FILM COATED ORAL at 08:11

## 2024-11-23 RX ADMIN — SCOLOPAMINE TRANSDERMAL SYSTEM 1 PATCH: 1 PATCH, EXTENDED RELEASE TRANSDERMAL at 08:11

## 2024-11-23 RX ADMIN — TRAZODONE HYDROCHLORIDE 100 MG: 100 TABLET ORAL at 12:11

## 2024-11-23 RX ADMIN — METHOCARBAMOL 1000 MG: 500 TABLET ORAL at 08:11

## 2024-11-23 RX ADMIN — DOCUSATE SODIUM 100 MG: 100 CAPSULE, LIQUID FILLED ORAL at 08:11

## 2024-11-23 RX ADMIN — ATORVASTATIN CALCIUM 20 MG: 20 TABLET, FILM COATED ORAL at 08:11

## 2024-11-23 RX ADMIN — DULOXETINE HYDROCHLORIDE 120 MG: 60 CAPSULE, DELAYED RELEASE ORAL at 08:11

## 2024-11-23 RX ADMIN — OXYCODONE HYDROCHLORIDE 10 MG: 10 TABLET ORAL at 10:11

## 2024-11-23 RX ADMIN — OXYCODONE 5 MG: 5 TABLET ORAL at 03:11

## 2024-11-23 NOTE — SUBJECTIVE & OBJECTIVE
Principal Problem:S/P cervical spinal fusion    Principal Orthopedic Problem: Same    Interval History: Pt was stable for DC yesterday but had a fall while taking Xrays and hit her head.  She reports mild neck and shoulder pain today. No dizziness or numbness/tingling of bilateral upper and lower extremities. No new complaint    Review of patient's allergies indicates:   Allergen Reactions    Aspirin Palpitations       Current Facility-Administered Medications   Medication    0.9%  NaCl infusion    atorvastatin tablet 20 mg    bisacodyL suppository 10 mg    busPIRone tablet 10 mg    calcium carbonate 200 mg calcium (500 mg) chewable tablet 500 mg    celecoxib capsule 100 mg    dextrose 10% bolus 125 mL 125 mL    dextrose 10% bolus 250 mL 250 mL    docusate sodium capsule 100 mg    DULoxetine DR capsule 120 mg    famotidine tablet 20 mg    gabapentin capsule 300 mg    glucagon (human recombinant) injection 1 mg    glucose chewable tablet 16 g    glucose chewable tablet 24 g    HYDROmorphone (PF) injection Syrg 0.5 mg    insulin aspart U-100 pen 0-5 Units    LORazepam tablet 1 mg    melatonin tablet 6 mg    methocarbamoL tablet 1,000 mg    metoprolol tartrate (LOPRESSOR) split tablet 12.5 mg    midodrine tablet 5 mg    nitrofurantoin (macrocrystal-monohydrate) 100 MG capsule 100 mg    ondansetron disintegrating tablet 8 mg    OXcarbazepine tablet 150 mg    oxyCODONE immediate release tablet 5 mg    oxyCODONE immediate release tablet Tab 10 mg    polyethylene glycol packet 17 g    promethazine tablet 25 mg    scopolamine 1.3-1.5 mg (1 mg over 3 days) 1 patch    traZODone tablet 100 mg     Objective:     Vital Signs (Most Recent):  Temp: 98 °F (36.7 °C) (11/23/24 0419)  Pulse: 80 (11/23/24 0419)  Resp: 16 (11/23/24 0419)  BP: (!) 140/72 (11/23/24 0419)  SpO2: 95 % (11/23/24 0419) Vital Signs (24h Range):  Temp:  [96.6 °F (35.9 °C)-98.6 °F (37 °C)] 98 °F (36.7 °C)  Pulse:  [67-83] 80  Resp:  [16-18] 16  SpO2:  [89 %-97  "%] 95 %  BP: (117-168)/(64-87) 140/72     Weight: 63.5 kg (140 lb)  Height: 5' 6" (167.6 cm)  Body mass index is 22.6 kg/m².    No intake or output data in the 24 hours ending 11/23/24 0638       Ortho/SPM Exam  Bilateral upper extremities SILT   AIN/PIN/Ulnar nerves intact   2+ radial and ulnar arteries bilaterally   Dressings CDI   C collar in place       Significant Labs: All pertinent labs within the past 24 hours have been reviewed.    Significant Imaging: I have reviewed and interpreted all pertinent imaging results/findings.    Xray C- spine reviewed without evidence of acute fracture or hardware loosening.  "

## 2024-11-23 NOTE — PT/OT/SLP PROGRESS
Physical Therapy Treatment    Patient Name:  Ana Lilia Matos   MRN:  [id number]    Recommendations:     Discharge Recommendations: Moderate Intensity Therapy  Discharge Equipment Recommendations: walker, rolling  Barriers to discharge:  fall risk, patient below functional baseline    Assessment:     Ana Lilia Matos is a 63 y.o. female admitted with a medical diagnosis of S/P cervical spinal fusion.  She presents with the following impairments/functional limitations: weakness, impaired endurance, impaired self care skills, impaired functional mobility, gait instability, impaired balance, decreased coordination, decreased upper extremity function, decreased lower extremity function, impaired fine motor, impaired coordination, decreased safety awareness, pain, impaired skin, impaired cardiopulmonary response to activity, orthopedic precautions. PT asked to reassess patient following a fall yesterday during standing x-rays, MD reports patient fell and hit her head. Patient demo'd significant balance impairment and instability with gait, she ambulated 14 + 10' with RW and  variable assistance for stability with gait ranging from contact guard assistance to moderate assistance for stability. Patient with significant loss of balance during R turn, patient with scissoring steps and loss of balance to R, PT provided moderate assistance to prevent fall and assist patient to recover balance. Per spouse, patient has POTS at balance and he attributes her instability to dizziness, patient inconsistently reporting dizziness this session. At baseline, patient does not use RW for gait. Patient and spouse educated that patient would need 24 hr physical assist whenever she is ambulating at home due to fall risk. PT recommending moderate intensity therapy on discharge due to fall risk, MD alerted. PT session limited as transport arrived to take patient for standing x-rays, MD alerted patient is unsteady and not safe for standing  "x-rays.   Patient currently demonstrates a need for moderate intensity therapy on a daily basis post acute secondary to a decline in functional status due to illness and surgical procedure     Rehab Prognosis: Good; patient would benefit from acute skilled PT services to address these deficits and reach maximum level of function.    Recent Surgery: Procedure(s) (LRB):  FUSION, SPINE, CERVICAL, POSTERIOR APPROACH **LOOP X** C2-T1 REVISION DEPUY SNS: MOTORS/SSEP/EMG 3500 BED, METAL ANDERSEN, HEAD TO CORE (N/A) 3 Days Post-Op    Plan:     During this hospitalization, patient to be seen 4 x/week to address the identified rehab impairments via gait training, therapeutic activities, therapeutic exercises, neuromuscular re-education and progress toward the following goals:    Plan of Care Expires:  12/21/24    Subjective     Chief Complaint: "I get dizzy sometimes", per spouse "Her cardiologist said she has POTs, so she gets dizzy when she moves too fast"  Patient/Family Comments/goals: per spouse "I'll be home with her all the time for 3 weeks"  Pain/Comfort:  Pain Rating 1: 7/10  Location - Orientation 1: generalized  Location 1: neck  Pain Addressed 1: Reposition, Distraction  Pain Rating Post-Intervention 1: 7/10      Objective:     Communicated with RN prior to session.  Patient found HOB elevated with  c-collar on upon PT entry to room. Spouse at bedside.     General Precautions: Standard, fall  Orthopedic Precautions: spinal precautions  Braces: Cervical collar  Respiratory Status: Room air     Functional Mobility:    Bed Mobility  Rolling to R: stand by assistance   Supine to Sit on the R side:  stand by assistance, cued for log roll   Transfers Sit to Stand:  contact guard assist with RW from bed and toilet   Gait  Gait Distance: 15 + 10 ft with RW  Assistance Level: contact guard assist to moderate assistance   Description: uneven foot placement- ataxic stepping, ambulating outside RW MIRZA, short shuffling steps, " loss of balance to L with R turn- required moderate assistance to recover balance, posterior lean with balance           AM-PAC 6 CLICK MOBILITY  Turning over in bed (including adjusting bedclothes, sheets and blankets)?: 3  Sitting down on and standing up from a chair with arms (e.g., wheelchair, bedside commode, etc.): 3  Moving from lying on back to sitting on the side of the bed?: 3  Moving to and from a bed to a chair (including a wheelchair)?: 3  Need to walk in hospital room?: 2  Climbing 3-5 steps with a railing?: 2  Basic Mobility Total Score: 16       Treatment & Education:  Patient educated on:  -role of therapy  -goals of session  -PT POC  -benefits of out of bed mobility and consequences of immobility  -calling for staff assist to mobilize safely  Patient agreeable to mobilize with therapy.      Patient ambulated to bathroom to urinate, assisted to ambulate to w/c for transport to x-ray.     Gait training: cued for upright posture, reciprocal strides, cued to ambulate inside RW MIRZA, pacing for energy conservation     Patient encouraged to ambulate, sit up in chair 3x/day to prevent deconditioning during hospitalization. Patient verbalized understanding and agreement to mobilize only with RN assist for safety.     Patient left  in wheelchair with transport  with all lines intact, call button in reach, and transport and spouse present.. RN and MD notified of patient's performance with PT.     GOALS:   Multidisciplinary Problems       Physical Therapy Goals          Problem: Physical Therapy    Goal Priority Disciplines Outcome Interventions   Physical Therapy Goal     PT, PT/OT Progressing    Description: Goals to be met by: 12/21/2024     Patient will increase functional independence with mobility by performing:    Supine to sit with Stand-by Assistance  Sit to supine with Stand-by Assistance  Rolling to Left and Right with Stand-by Assistance.  Sit to stand transfer with Stand-by Assistance  Bed to  chair transfer with Stand-by Assistance using Rolling Walker  Gait  x 50 feet with Contact Guard Assistance using Rolling Walker.   Ascend/Descend 4 stair steps with Contact Guard Assistance using No Assistive Device and BHRs                         Time Tracking:     PT Received On: 11/23/24  PT Start Time: 1235     PT Stop Time: 1247  PT Total Time (min): 12 min     Billable Minutes: Gait Training 12    Treatment Type: Treatment  PT/PTA: PT     Number of PTA visits since last PT visit: 0     11/23/2024

## 2024-11-23 NOTE — ASSESSMENT & PLAN NOTE
Ana Liliatarik Matos is a 63 y.o. female is s/p Revision C2-T1 PCF 11/20/24    Surgical dressing C/D/I  Pain control: multimodal  PT/OT: WBAT BLE, spine precautions, C collar all times   DVT PPx: FCDs at all times when not ambulating  Abx: postop Ancef x 24hrs   Silva: None  Nursing: Incentive spirometry, Monitor and record drain output each shift     Dispo: Stable for DC today

## 2024-11-23 NOTE — PROGRESS NOTES
[facility]  Orthopedics  Progress Note    Patient Name: Ana Lilia Matos  MRN: [id number]  Admission Date: 11/20/2024  Hospital Length of Stay: 3 days  Attending Provider: Marlon Cruz MD  Primary Care Provider: Priti, Primary Doctor  Follow-up For: Procedure(s) (LRB):  FUSION, SPINE, CERVICAL, POSTERIOR APPROACH **LOOP X** C2-T1 REVISION DEPUY SNS: MOTORS/SSEP/EMG 3500 BED, METAL ANDERSEN, HEAD TO CORE (N/A)    Post-Operative Day: 3 Days Post-Op  Subjective:     Principal Problem:S/P cervical spinal fusion    Principal Orthopedic Problem: Same    Interval History: Pt was stable for DC yesterday but had a fall while taking Xrays and hit her head.  She reports mild neck and shoulder pain today. No dizziness or numbness/tingling of bilateral upper and lower extremities. No new complaint    Review of patient's allergies indicates:   Allergen Reactions    Aspirin Palpitations       Current Facility-Administered Medications   Medication    0.9%  NaCl infusion    atorvastatin tablet 20 mg    bisacodyL suppository 10 mg    busPIRone tablet 10 mg    calcium carbonate 200 mg calcium (500 mg) chewable tablet 500 mg    celecoxib capsule 100 mg    dextrose 10% bolus 125 mL 125 mL    dextrose 10% bolus 250 mL 250 mL    docusate sodium capsule 100 mg    DULoxetine DR capsule 120 mg    famotidine tablet 20 mg    gabapentin capsule 300 mg    glucagon (human recombinant) injection 1 mg    glucose chewable tablet 16 g    glucose chewable tablet 24 g    HYDROmorphone (PF) injection Syrg 0.5 mg    insulin aspart U-100 pen 0-5 Units    LORazepam tablet 1 mg    melatonin tablet 6 mg    methocarbamoL tablet 1,000 mg    metoprolol tartrate (LOPRESSOR) split tablet 12.5 mg    midodrine tablet 5 mg    nitrofurantoin (macrocrystal-monohydrate) 100 MG capsule 100 mg    ondansetron disintegrating tablet 8 mg    OXcarbazepine tablet 150 mg    oxyCODONE immediate release tablet 5 mg    oxyCODONE immediate release tablet Tab 10 mg     "polyethylene glycol packet 17 g    promethazine tablet 25 mg    scopolamine 1.3-1.5 mg (1 mg over 3 days) 1 patch    traZODone tablet 100 mg     Objective:     Vital Signs (Most Recent):  Temp: 98 °F (36.7 °C) (11/23/24 0419)  Pulse: 80 (11/23/24 0419)  Resp: 16 (11/23/24 0419)  BP: (!) 140/72 (11/23/24 0419)  SpO2: 95 % (11/23/24 0419) Vital Signs (24h Range):  Temp:  [96.6 °F (35.9 °C)-98.6 °F (37 °C)] 98 °F (36.7 °C)  Pulse:  [67-83] 80  Resp:  [16-18] 16  SpO2:  [89 %-97 %] 95 %  BP: (117-168)/(64-87) 140/72     Weight: 63.5 kg (140 lb)  Height: 5' 6" (167.6 cm)  Body mass index is 22.6 kg/m².    No intake or output data in the 24 hours ending 11/23/24 0638       Ortho/SPM Exam  Bilateral upper extremities SILT   AIN/PIN/Ulnar nerves intact   2+ radial and ulnar arteries bilaterally   Dressings CDI   C collar in place       Significant Labs: All pertinent labs within the past 24 hours have been reviewed.    Significant Imaging: I have reviewed and interpreted all pertinent imaging results/findings.    Xray C- spine reviewed without evidence of acute fracture or hardware loosening.  Assessment/Plan:     * Revision C2-T1 PCF 11/20/24  Ana Lilia Matos is a 63 y.o. female is s/p Revision C2-T1 PCF 11/20/24    Surgical dressing C/D/I  Pain control: multimodal  PT/OT: WBAT BLE, spine precautions, C collar all times   DVT PPx: FCDs at all times when not ambulating  Abx: postop Ancef x 24hrs   Silva: None  Nursing: Incentive spirometry, Monitor and record drain output each shift     Dispo: Stable for DC today               Dillon Reyes MD  Orthopedics  [facility] - Surgery    "

## 2024-11-23 NOTE — PLAN OF CARE
Problem: Adult Inpatient Plan of Care  Goal: Plan of Care Review  Outcome: Progressing  Goal: Patient-Specific Goal (Individualized)  Outcome: Progressing  Goal: Absence of Hospital-Acquired Illness or Injury  Outcome: Progressing  Goal: Optimal Comfort and Wellbeing  Outcome: Progressing  Goal: Readiness for Transition of Care  Outcome: Progressing     Problem: Wound  Goal: Optimal Coping  Outcome: Progressing  Goal: Optimal Functional Ability  Outcome: Progressing  Goal: Absence of Infection Signs and Symptoms  Outcome: Progressing  Goal: Improved Oral Intake  Outcome: Progressing  Goal: Optimal Pain Control and Function  Outcome: Progressing  Goal: Skin Health and Integrity  Outcome: Progressing  Goal: Optimal Wound Healing  Outcome: Progressing     Problem: Fall Injury Risk  Goal: Absence of Fall and Fall-Related Injury  Outcome: Progressing     Problem: Skin Injury Risk Increased  Goal: Skin Health and Integrity  Outcome: Progressing   Overall she had a restful day with plans to discharge home.  While in radiology it is reported to me that she passed out hitting her head in the process.   The attending team evaluated her and made a plan with her that she will stay over night and possibly leave in the am.   She remains aaox4.   She has remained in her neck collar at all times while on our unit per doctors order.   Her pain has been well controled.   The bed is in the lowest position and the call light is within reach.

## 2024-11-23 NOTE — PLAN OF CARE
Problem: Adult Inpatient Plan of Care  Goal: Plan of Care Review  Outcome: Progressing  Goal: Patient-Specific Goal (Individualized)  Outcome: Progressing  Goal: Absence of Hospital-Acquired Illness or Injury  Outcome: Progressing  Goal: Optimal Comfort and Wellbeing  Outcome: Progressing  Goal: Readiness for Transition of Care  Outcome: Progressing     Problem: Wound  Goal: Optimal Coping  Outcome: Progressing  Goal: Optimal Functional Ability  Outcome: Progressing  Goal: Absence of Infection Signs and Symptoms  Outcome: Progressing  Goal: Improved Oral Intake  Outcome: Progressing  Goal: Optimal Pain Control and Function  Outcome: Progressing  Goal: Skin Health and Integrity  Outcome: Progressing  Goal: Optimal Wound Healing  Outcome: Progressing     Problem: Fall Injury Risk  Goal: Absence of Fall and Fall-Related Injury  Outcome: Progressing     Problem: Skin Injury Risk Increased  Goal: Skin Health and Integrity  Outcome: Progressing     Pt is calm and cooperative while lying supine in bed with c-collar on and in place. Pt c/o of pain as scheduled and PRN medications were given for control as pt tolerated intervention. Pt was able to tolerate ambulation with assistance to the bathroom. BP was monitored after scheduled medication administration. Since BP was WNL pt was able to rest with the administration of their trazodone 100 mg tablet after expressing trouble falling asleep. The bed is low, side rails raised x2 with call light in reach. Continue plan of care.

## 2024-11-24 VITALS
TEMPERATURE: 99 F | SYSTOLIC BLOOD PRESSURE: 136 MMHG | RESPIRATION RATE: 17 BRPM | HEIGHT: 66 IN | BODY MASS INDEX: 22.5 KG/M2 | HEART RATE: 66 BPM | DIASTOLIC BLOOD PRESSURE: 64 MMHG | WEIGHT: 140 LBS | OXYGEN SATURATION: 94 %

## 2024-11-24 LAB — POCT GLUCOSE: 137 MG/DL (ref 70–110)

## 2024-11-24 PROCEDURE — 25000003 PHARM REV CODE 250: Performed by: STUDENT IN AN ORGANIZED HEALTH CARE EDUCATION/TRAINING PROGRAM

## 2024-11-24 PROCEDURE — 97535 SELF CARE MNGMENT TRAINING: CPT

## 2024-11-24 PROCEDURE — 94799 UNLISTED PULMONARY SVC/PX: CPT

## 2024-11-24 PROCEDURE — 94761 N-INVAS EAR/PLS OXIMETRY MLT: CPT

## 2024-11-24 PROCEDURE — 99900035 HC TECH TIME PER 15 MIN (STAT)

## 2024-11-24 RX ADMIN — FAMOTIDINE 20 MG: 20 TABLET ORAL at 08:11

## 2024-11-24 RX ADMIN — DOCUSATE SODIUM 100 MG: 100 CAPSULE, LIQUID FILLED ORAL at 08:11

## 2024-11-24 RX ADMIN — CELECOXIB 100 MG: 100 CAPSULE ORAL at 08:11

## 2024-11-24 RX ADMIN — DULOXETINE HYDROCHLORIDE 120 MG: 60 CAPSULE, DELAYED RELEASE ORAL at 08:11

## 2024-11-24 RX ADMIN — BUSPIRONE HYDROCHLORIDE 10 MG: 10 TABLET ORAL at 08:11

## 2024-11-24 RX ADMIN — NITROFURANTOIN MONOHYDRATE/MACROCRYSTALS 100 MG: 25; 75 CAPSULE ORAL at 08:11

## 2024-11-24 RX ADMIN — METHOCARBAMOL 1000 MG: 500 TABLET ORAL at 08:11

## 2024-11-24 RX ADMIN — METOPROLOL TARTRATE 12.5 MG: 25 TABLET, FILM COATED ORAL at 08:11

## 2024-11-24 RX ADMIN — MIDODRINE HYDROCHLORIDE 5 MG: 5 TABLET ORAL at 08:11

## 2024-11-24 RX ADMIN — POLYETHYLENE GLYCOL 3350 17 G: 17 POWDER, FOR SOLUTION ORAL at 08:11

## 2024-11-24 RX ADMIN — ATORVASTATIN CALCIUM 20 MG: 20 TABLET, FILM COATED ORAL at 08:11

## 2024-11-24 RX ADMIN — OXYCODONE HYDROCHLORIDE 10 MG: 10 TABLET ORAL at 11:11

## 2024-11-24 RX ADMIN — GABAPENTIN 300 MG: 300 CAPSULE ORAL at 08:11

## 2024-11-24 RX ADMIN — OXCARBAZEPINE 150 MG: 150 TABLET, FILM COATED ORAL at 08:11

## 2024-11-24 NOTE — PLAN OF CARE
11/24/24 1212   Post-Acute Status   Post-Acute Authorization Home Health   Home Health Status Referrals Sent

## 2024-11-24 NOTE — NURSING
Pt discharged to home( [place]) via wheelchair. Pt given received medication from bedside pharmacy and copy of discharged papers instructions. Pt denies pain at this time. Pt educated on signs and symptoms of when to call the doctor. All belongings with the patient . Pt verbalized understanding.

## 2024-11-24 NOTE — PROGRESS NOTES
[facility]  Orthopedics  Progress Note    Patient Name: Ana Lilia Matos  MRN: [id number]  Admission Date: 11/20/2024  Hospital Length of Stay: 4 days  Attending Provider: Marlon Cruz MD  Primary Care Provider: Priti, Primary Doctor  Follow-up For: Procedure(s) (LRB):  FUSION, SPINE, CERVICAL, POSTERIOR APPROACH **LOOP X** C2-T1 REVISION DEPUY SNS: MOTORS/SSEP/EMG 3500 BED, METAL ANDERSEN, HEAD TO CORE (N/A)    Post-Operative Day: 4 Days Post-Op  Subjective:     Principal Problem:S/P cervical spinal fusion    Principal Orthopedic Problem: Same    Interval History: Pt unable to DC yesterday due to unsteadiness while walking and dizziness. She reported this morning that her dizziness is chronic and is the reason why she stopped driving about 2 years ago. She experiences it intermittently. She c/o head, neck and shoulder pain. VSS. Walked 50ft with RW with PT yesterday    Review of patient's allergies indicates:   Allergen Reactions    Aspirin Palpitations       Current Facility-Administered Medications   Medication    0.9%  NaCl infusion    atorvastatin tablet 20 mg    bisacodyL suppository 10 mg    busPIRone tablet 10 mg    calcium carbonate 200 mg calcium (500 mg) chewable tablet 500 mg    celecoxib capsule 100 mg    dextrose 10% bolus 125 mL 125 mL    dextrose 10% bolus 250 mL 250 mL    docusate sodium capsule 100 mg    DULoxetine DR capsule 120 mg    famotidine tablet 20 mg    gabapentin capsule 300 mg    glucagon (human recombinant) injection 1 mg    glucose chewable tablet 16 g    glucose chewable tablet 24 g    HYDROmorphone (PF) injection Syrg 0.5 mg    insulin aspart U-100 pen 0-5 Units    LORazepam tablet 1 mg    melatonin tablet 6 mg    methocarbamoL tablet 1,000 mg    metoprolol tartrate (LOPRESSOR) split tablet 12.5 mg    midodrine tablet 5 mg    nitrofurantoin (macrocrystal-monohydrate) 100 MG capsule 100 mg    ondansetron disintegrating tablet 8 mg    OXcarbazepine tablet 150 mg    oxyCODONE  "immediate release tablet 5 mg    oxyCODONE immediate release tablet Tab 10 mg    polyethylene glycol packet 17 g    promethazine tablet 25 mg    scopolamine 1.3-1.5 mg (1 mg over 3 days) 1 patch    traZODone tablet 100 mg     Objective:     Vital Signs (Most Recent):  Temp: 97 °F (36.1 °C) (11/24/24 0453)  Pulse: 61 (11/24/24 0453)  Resp: 18 (11/24/24 0453)  BP: 111/65 (11/24/24 0453)  SpO2: (!) 92 % (11/24/24 0453) Vital Signs (24h Range):  Temp:  [97 °F (36.1 °C)-98.6 °F (37 °C)] 97 °F (36.1 °C)  Pulse:  [61-96] 61  Resp:  [16-20] 18  SpO2:  [92 %-96 %] 92 %  BP: (107-133)/(57-78) 111/65     Weight: 63.5 kg (140 lb)  Height: 5' 6" (167.6 cm)  Body mass index is 22.6 kg/m².      Intake/Output Summary (Last 24 hours) at 11/24/2024 0614  Last data filed at 11/23/2024 1200  Gross per 24 hour   Intake 600 ml   Output --   Net 600 ml          Ortho/SPM Exam  Bilateral upper extremities SILT   AIN/PIN/Ulnar nerves intact   2+ radial and ulnar arteries bilaterally   Dressings CDI   C collar in place       Significant Labs: All pertinent labs within the past 24 hours have been reviewed.    Significant Imaging: I have reviewed and interpreted all pertinent imaging results/findings.    Xray C- spine reviewed without evidence of acute fracture or hardware loosening.  Assessment/Plan:     * Revision C2-T1 PCF 11/20/24  Ana Lilia Matos is a 63 y.o. female is s/p Revision C2-T1 PCF 11/20/24. Discussed pt's intermittent dizziness. Rec f/u with pcp & cardiology.    Surgical dressing C/D/I  Pain control: multimodal  PT/OT: WBAT BLE, spine precautions, C collar all times   DVT PPx: FCDs at all times when not ambulating  Abx: postop Ancef x 24hrs   Silva: None  Nursing: Incentive spirometry, Monitor and record drain output each shift     Dispo: Stable for DC today.              Dillon Reyes MD  Orthopedics  [facility] - Surgery    "

## 2024-11-24 NOTE — SUBJECTIVE & OBJECTIVE
Principal Problem:S/P cervical spinal fusion    Principal Orthopedic Problem: Same    Interval History: Pt unable to DC yesterday due to unsteadiness while walking and dizziness. She reported this morning that her dizziness is chronic and is the reason why she stopped driving about 2 years ago. She experiences it intermittently. She c/o head, neck and shoulder pain. VSS. Walked 50ft with RW with PT yesterday    Review of patient's allergies indicates:   Allergen Reactions    Aspirin Palpitations       Current Facility-Administered Medications   Medication    0.9%  NaCl infusion    atorvastatin tablet 20 mg    bisacodyL suppository 10 mg    busPIRone tablet 10 mg    calcium carbonate 200 mg calcium (500 mg) chewable tablet 500 mg    celecoxib capsule 100 mg    dextrose 10% bolus 125 mL 125 mL    dextrose 10% bolus 250 mL 250 mL    docusate sodium capsule 100 mg    DULoxetine DR capsule 120 mg    famotidine tablet 20 mg    gabapentin capsule 300 mg    glucagon (human recombinant) injection 1 mg    glucose chewable tablet 16 g    glucose chewable tablet 24 g    HYDROmorphone (PF) injection Syrg 0.5 mg    insulin aspart U-100 pen 0-5 Units    LORazepam tablet 1 mg    melatonin tablet 6 mg    methocarbamoL tablet 1,000 mg    metoprolol tartrate (LOPRESSOR) split tablet 12.5 mg    midodrine tablet 5 mg    nitrofurantoin (macrocrystal-monohydrate) 100 MG capsule 100 mg    ondansetron disintegrating tablet 8 mg    OXcarbazepine tablet 150 mg    oxyCODONE immediate release tablet 5 mg    oxyCODONE immediate release tablet Tab 10 mg    polyethylene glycol packet 17 g    promethazine tablet 25 mg    scopolamine 1.3-1.5 mg (1 mg over 3 days) 1 patch    traZODone tablet 100 mg     Objective:     Vital Signs (Most Recent):  Temp: 97 °F (36.1 °C) (11/24/24 0453)  Pulse: 61 (11/24/24 0453)  Resp: 18 (11/24/24 0453)  BP: 111/65 (11/24/24 0453)  SpO2: (!) 92 % (11/24/24 0453) Vital Signs (24h Range):  Temp:  [97 °F (36.1 °C)-98.6 °F  "(37 °C)] 97 °F (36.1 °C)  Pulse:  [61-96] 61  Resp:  [16-20] 18  SpO2:  [92 %-96 %] 92 %  BP: (107-133)/(57-78) 111/65     Weight: 63.5 kg (140 lb)  Height: 5' 6" (167.6 cm)  Body mass index is 22.6 kg/m².      Intake/Output Summary (Last 24 hours) at 11/24/2024 0614  Last data filed at 11/23/2024 1200  Gross per 24 hour   Intake 600 ml   Output --   Net 600 ml          Ortho/SPM Exam  Bilateral upper extremities SILT   AIN/PIN/Ulnar nerves intact   2+ radial and ulnar arteries bilaterally   Dressings CDI   C collar in place       Significant Labs: All pertinent labs within the past 24 hours have been reviewed.    Significant Imaging: I have reviewed and interpreted all pertinent imaging results/findings.    Xray C- spine reviewed without evidence of acute fracture or hardware loosening.  "

## 2024-11-24 NOTE — ASSESSMENT & PLAN NOTE
Ana Lilia Matos is a 63 y.o. female is s/p Revision C2-T1 PCF 11/20/24    Surgical dressing C/D/I  Pain control: multimodal  PT/OT: WBAT BLE, spine precautions, C collar all times   DVT PPx: FCDs at all times when not ambulating  Abx: postop Ancef x 24hrs   Silva: None  Nursing: Incentive spirometry, Monitor and record drain output each shift     Dispo: Stable for DC today.

## 2024-11-24 NOTE — PLAN OF CARE
Pt discharged to home via wheelchair. Pt receivef copy of discharged papers instructions. Pt denies pain at this time. Pt educated on signs and symptoms of when to call the doctor. All belongings with the patient . Pt verbalized understanding.

## 2024-11-24 NOTE — PROGRESS NOTES
"[facility] - [facility]  Discharge Final Note    Primary Care Provider: No, Primary Doctor    Expected Discharge Date: 11/24/2024    Final Discharge Note (most recent)       Final Note - 11/24/24 1212          Post-Acute Status    Post-Acute Authorization Home Health     Home Health Status Referrals Sent                     Important Message from Medicare  Important Message from Medicare regarding Discharge Appeal Rights: Given to patient/caregiver, Explained to patient/caregiver, Signed/date by patient/caregiver     Date IMM was signed: 11/22/24  Time IMM was signed: 1121      No future appointments.   Patient states that she will make her followup appointment due to her living in [place] ," I can get it set  up for Monday ".    Patient's chart has been reviewed by CM.Patient does not  have any additional needs.Patient family will provide discharge transport home. Patient is cleared to discharge home with family.Patient received a RW from [facility] on 11/22/24  "

## 2024-11-24 NOTE — DISCHARGE SUMMARY
"[facility] - Surgery  Orthopedics  Discharge Summary      Patient Name: Ana Lilia Matos  MRN: [id number]  Admission Date: 11/20/2024  Hospital Length of Stay: 4 days  Discharge Date and Time:  11/24/2024 6:36 AM  Attending Physician: Marlon Cruz MD   Discharging Provider: Dillon Reyes MD  Primary Care Provider: Priti, Primary Doctor    HPI:   Ana Lilia Matos is a 63 y.o. female who returns to me today for CT review. She reports that anytime she performs ADLs/use her arms she has a tightness in her neck and shoulder. She reports that she has been having "electrical impulses" going down from her neck to her left shoulder and anterior chest wall and that her hands will periodically go numb. She reports that the numbness in her hands alternates and is diffuse in nature. She reports episodes of dizziness also. She reports she has been evaluated by a neurologist with no relief of her symptoms related to her dizziness.      The patient does not smoke or endorse IVDU. She has DM (HA1C of 5.8). The patient is not on any blood thinners and does not take chronic narcotics. She is disabled due to her neck. She was accompanied by her 2 daughters, and they drove 5 hours from [place].       Procedure(s) (LRB):  FUSION, SPINE, CERVICAL, POSTERIOR APPROACH **LOOP X** C2-T1 REVISION DEPUY SNS: MOTORS/SSEP/EMG 3500 BED, [facility], HEAD TO CORE (N/A)      Hospital Course:  On 11/19/2024, the patient arrived to the [facility] OR for proper pre-operative management.  Upon completion of pre-operative preparation, the patient was taken back to the operative theatre. C2-T1 revision PCF was performed without complication and the patient was transported to the post anesthesia care unit in stable condition.  After appropriate recovery from the anaesthetic agents used during the surgery, the patient was then transported to the hospital inpatient floor.  The interim of the hospital stay from arrival on the floor up to discharge has been " "uncomplicated. The patient has tolerated regular diet.  The patient's pain has been controlled using a multimodal approach. Currently, the patient's pain is well controlled on an oral regimen.  The patient has been voiding without difficulty.  The patient began participation in physical therapy after surgery and has progressed throughout the entire hospital stay.  She had a fall during Xray session causing neck pain, HA and dizziness. Symptoms were monitored after the fall and she has progressed appropriately.  Currently, the patient's progress is sufficient to allow the them to be discharged to home safely.  The patient agrees with this assessment and desires a discharge today.      Goals of Care Treatment Preferences:  Code Status: Full Code      Consults (From admission, onward)          Status Ordering Provider     IP consult case management/social work  Once        Provider:  (Not yet assigned)    Acknowledged FRANCESCO DAMON            Significant Diagnostic Studies: No pertinent studies.    Pending Diagnostic Studies:       None          Final Active Diagnoses:    Diagnosis Date Noted POA    PRINCIPAL PROBLEM:  Revision C2-T1 PCF 11/20/24 [Z98.1] 11/19/2024 Not Applicable      Problems Resolved During this Admission:      Discharged Condition: good    Disposition: Home-Health Care [place]    Follow Up:    Patient Instructions:      WALKER FOR HOME USE     Order Specific Question Answer Comments   Type of Walker: Adult (5'4"-6'6")    With wheels? Yes    Height: 5' 6" (1.676 m)    Weight: 63.5 kg (140 lb)    Length of need (1-99 months): 99    Please check all that apply: Patient needs help to get in and out of chair.    Please check all that apply: Walker will be used for gait training.    Please check all that apply: Patient is unable to safely ambulate without equipment.      Diet general     Call MD for:  temperature >100.4     Call MD for:  persistent nausea and vomiting     Call MD for:  severe uncontrolled " pain     Call MD for:  difficulty breathing, headache or visual disturbances     Call MD for:  redness, tenderness, or signs of infection (pain, swelling, redness, odor or green/yellow discharge around incision site)     Call MD for:  hives     Call MD for:  persistent dizziness or light-headedness     Call MD for:  extreme fatigue     Activity as tolerated     Medications:  Reconciled Home Medications:      Medication List        START taking these medications      famotidine 20 MG tablet  Commonly known as: PEPCID  Take 1 tablet (20 mg total) by mouth 2 (two) times daily.     gabapentin 300 MG capsule  Commonly known as: NEURONTIN  Take 1 capsule (300 mg total) by mouth 3 (three) times daily.     methocarbamoL 750 MG Tab  Commonly known as: ROBAXIN  Take 1 tablet (750 mg total) by mouth 4 (four) times daily.     oxyCODONE 5 MG immediate release tablet  Commonly known as: ROXICODONE  Take 1 tablet (5 mg total) by mouth every 8 (eight) hours as needed for Pain.     senna-docusate 8.6-50 mg 8.6-50 mg per tablet  Commonly known as: PERICOLACE  Take 1 tablet by mouth 2 (two) times daily. for 14 days            CHANGE how you take these medications      acetaminophen 325 MG tablet  Commonly known as: TYLENOL  Take 2 tablets (650 mg total) by mouth every 6 (six) hours as needed for Pain.  What changed:   medication strength  how much to take  reasons to take this     * ondansetron 4 MG Tbdl  Commonly known as: ZOFRAN-ODT  Take 2 tablets (8 mg total) by mouth every 8 (eight) hours as needed.  What changed: You were already taking a medication with the same name, and this prescription was added. Make sure you understand how and when to take each.     * ondansetron 4 MG Tbdl  Commonly known as: ZOFRAN-ODT  Take 1 tablet (4 mg total) by mouth every 8 (eight) hours as needed.  What changed: Another medication with the same name was added. Make sure you understand how and when to take each.           * This list has 2  medication(s) that are the same as other medications prescribed for you. Read the directions carefully, and ask your doctor or other care provider to review them with you.                CONTINUE taking these medications      albuterol 90 mcg/actuation inhaler  Commonly known as: PROVENTIL/VENTOLIN HFA  Inhale 2 puffs into the lungs every 6 (six) hours as needed.     anastrozole 1 mg Tab  Commonly known as: ARIMIDEX  Take 1 mg by mouth once daily.     atorvastatin 20 MG tablet  Commonly known as: LIPITOR  Take 20 mg by mouth once daily.     busPIRone 10 MG tablet  Commonly known as: BUSPAR  Take 10 mg by mouth 2 (two) times daily.     CALCIUM 500 + D 500 mg-10 mcg (400 unit) Tab  Generic drug: calcium carbonate-vitamin D3  Take 1 tablet by mouth Daily.     cranberry conc-ascorbic acid 4,200-20 mg Cap  Take 1 capsule by mouth Daily.     DULoxetine 20 MG capsule  Commonly known as: CYMBALTA  Take 120 mg by mouth once daily. SAID SHE TAKES 120 MG  IN THE AM     fexofenadine-pseudoephedrine  mg  mg per tablet  Commonly known as: ALLEGRA-D  Take 1 tablet by mouth nightly.     fluticasone propionate 50 mcg/actuation nasal spray  Commonly known as: FLONASE  2 sprays by Each Nostril route once daily.     LORazepam 1 MG tablet  Commonly known as: ATIVAN  Take 1 mg by mouth 3 (three) times daily.     metoprolol tartrate 25 MG tablet  Commonly known as: LOPRESSOR  Take 12.5 mg by mouth 2 (two) times daily.     midodrine 5 MG Tab  Commonly known as: PROAMATINE  Take 5 mg by mouth 2 (two) times daily.     nitrofurantoin (macrocrystal-monohydrate) 100 MG capsule  Commonly known as: MACROBID  Take 100 mg by mouth every 12 (twelve) hours.     OXcarbazepine 150 MG Tab  Commonly known as: TRILEPTAL  Take 150 mg by mouth 2 (two) times daily.     pantoprazole 40 MG tablet  Commonly known as: PROTONIX  Take 40 mg by mouth once daily.     teriparatide 20 mcg/dose (600mcg/2.4mL) Pnij  Commonly known as: FORTEO  Inject 0.08 mLs  (20 mcg total) into the skin once daily.     tiZANidine 4 MG tablet  Commonly known as: ZANAFLEX  Take 1 tablet (4 mg total) by mouth every 8 (eight) hours as needed.  Notes to patient: Hold while on Robaxin     traZODone 100 MG tablet  Commonly known as: DESYREL  Take 100 mg by mouth nightly.              Dillon Reyes MD  Orthopedics  [facility] - Surgery

## 2024-11-24 NOTE — PT/OT/SLP PROGRESS
"Occupational Therapy   Treatment    Name: Ana Lilia Matos  MRN: [id number]  Admitting Diagnosis:  S/P cervical spinal fusion  4 Days Post-Op    Recommendations:     Discharge Recommendations: Low Intensity Therapy  Discharge Equipment Recommendations:  walker, rolling  Barriers to discharge:  None    Assessment:     Ana Lilia Matos is a 63 y.o. female with a medical diagnosis of S/P cervical spinal fusion.  She presents with the following performance deficits affecting function: gait instability, pain, impaired functional mobility, impaired self care skills. Pt was willing to participate and tolerated session well overall. She ambulated to the bathroom to perform toilet t/f and hygiene tasks before returning to bed. Pt reported dizziness upon sitting eob but symptoms subsided. She needed min vc's for walker management and orientation to bathroom in room.     Rehab Prognosis:  Good; patient would benefit from acute skilled OT services to address these deficits and reach maximum level of function.       Plan:     Patient to be seen 3 x/week to address the above listed problems via self-care/home management, therapeutic activities, therapeutic exercises  Plan of Care Expires: 12/21/24  Plan of Care Reviewed with: patient, spouse    Subjective     Chief Complaint: neck pain  Patient/Family Comments/goals: "I got up earlier this morning."  Pain/Comfort:  Pain Rating 1: 9/10  Location - Side 1: Bilateral  Location - Orientation 1: generalized  Location 1: neck  Pain Addressed 1: Reposition, Distraction  Pain Rating Post-Intervention 1:  (not rated)    Objective:     Communicated with: RN prior to session.  Patient found supine with cervical collar upon OT entry to room.    General Precautions: Standard, fall    Orthopedic Precautions:spinal precautions  Braces: Cervical collar  Respiratory Status: Room air     Occupational Performance:     Bed Mobility:    Patient completed Rolling/Turning to Right with " supervision  Patient completed Scooting/Bridging with supervision  Patient completed Supine to Sit with supervision     Functional Mobility/Transfers:  Patient completed Sit <> Stand Transfer with supervision  with  rolling walker   Patient completed Toilet Transfer Step Transfer technique with stand by assistance with  rolling walker  Functional Mobility: from bed to bathroom back to bed; SBA c/ RW; min vc's to keep walker in front of body    Activities of Daily Living:  Grooming: stand by assistance pt washed face standing at sink  Upper Body Dressing: minimum assistance donned gown as robe sitting eob  Toileting: pt not needing to void at this time        [facility] 6 Click ADL: 22    Treatment & Education:  Pt edu on role of OT, POC, safety when performing self care tasks , benefit of performing OOB activity, and safety when performing functional transfers and mobility.  - White board updated  - Self care tasks completed-- as noted above    - pt educated on safe t/f's c/ RW to various surfaces to prep for ADLs  - pt educated on ankle pumps eob to assist c/ dizziness     Patient left supine with all lines intact, call button in reach, RN notified, and  present    GOALS:   Multidisciplinary Problems       Occupational Therapy Goals          Problem: Occupational Therapy    Goal Priority Disciplines Outcome Interventions   Occupational Therapy Goal     OT, PT/OT Progressing    Description: Goals to be met by: 12/21/24     Patient will increase functional independence with ADLs by performing:    UE Dressing with Supervision.  LE Dressing with Stand-by Assistance.  Grooming while standing at sink with Stand-By Assistance  Toileting from toilet with Stand-by Assistance for hygiene and clothing management.   All functional transfers performed with SBA                         Time Tracking:     OT Date of Treatment: 11/24/24  OT Start Time: 0911  OT Stop Time: 0922  OT Total Time (min): 11 min    Billable Minutes:Self  Care/Home Management 11    OT/SHANNON: OT          11/24/2024

## 2024-11-25 NOTE — PLAN OF CARE
[facility]  Discharge Final Note    Primary Care Provider: No, Primary Doctor    Expected Discharge Date: 11/24/2024    Final Discharge Note (most recent)       Final Note - 11/25/24 1509          Final Note    Assessment Type Final Discharge Note     Anticipated Discharge Disposition Home or Self Care     What phone number can be called within the next 1-3 days to see how you are doing after discharge? --   [phone]       Post-Acute Status    Post-Acute Authorization Home Health     Home Health Status Set-up Complete/Auth obtained                     Important Message from Medicare  Important Message from Medicare regarding Discharge Appeal Rights: Given to patient/caregiver, Explained to patient/caregiver, Signed/date by patient/caregiver     Date IMM was signed: 11/22/24  Time IMM was signed: 1121    Contact Info       Home health        Next Steps: Call    Instructions: HOME HEALTH- will contact you directly to initate home heatlh care.            Patient discharged home to care of family on 11/25/24.    Gretchen Fabian RNCM  Case Management  [facility]  [phone]

## 2024-11-27 NOTE — PROGRESS NOTES
Date: 11/27/2024    Supervising Physician: Marlon Cruz M.D.    Date of Surgery: 11/20/24    Procedure: C2-T1 fusion/revision     History: Ana Lilia Matos is seen today for follow-up following the above listed procedure. Overall the patient is doing well but today notes mild neck pain.   Pain is well controlled with current pain medication.  she denies fever, chills, and sweats since the time of the surgery.     Exam: Post op dressing taken down. Stables removed. Incision is healing well, clean, dry and intact.   There is no sign of infection. Neuro exam is stable. No signs of DVT.    Radiographs: hardware in place without failure    Assessment/Plan: 3 weeks post op.    Doing well postoperatively.  reviewed. No lifting over 10lbs    I will plan to see the patient back for the next postop visit in 2 weeks virtually and 2 months in person with xray.     Thank you for the opportunity to participate in this patient's care. Please give me a call if there are any concerns or questions.

## 2024-12-02 ENCOUNTER — TELEPHONE (OUTPATIENT)
Dept: ORTHOPEDIC SURGERY | Facility: CLINIC | Age: 64
End: 2024-12-02
Payer: MEDICARE

## 2024-12-02 NOTE — TELEPHONE ENCOUNTER
Returned call and informed HH that patient has appt with NP to have staples remove in clinic. Neli verbalized understanding.    ----- Message from Jorge Ayoub sent at 12/2/2024 10:01 AM CST -----  Regarding: verbal orders  Contact: Neli   Type:  Needs Medical Advice    Who Called: Neli  calling  from [facility]  calling to see if she can get a verbal order to remove  Ms Ana Lilia gresham kurt or when do the provider wants them to be removed          Would the patient rather a call back or a response via [facility]?  Call   Best Call Back Number: Neli   Additional Information:

## 2024-12-11 ENCOUNTER — HOSPITAL ENCOUNTER (OUTPATIENT)
Dept: RADIOLOGY | Facility: HOSPITAL | Age: 64
Discharge: HOME | End: 2024-12-11
Attending: REGISTERED NURSE
Payer: MEDICARE

## 2024-12-11 ENCOUNTER — OFFICE VISIT (OUTPATIENT)
Dept: ORTHOPEDICS | Facility: CLINIC | Age: 64
End: 2024-12-11
Payer: MEDICARE

## 2024-12-11 VITALS — HEIGHT: 66 IN | BODY MASS INDEX: 23.81 KG/M2 | WEIGHT: 148.13 LBS

## 2024-12-11 DIAGNOSIS — Z98.1 S/P CERVICAL SPINAL FUSION: ICD-10-CM

## 2024-12-11 DIAGNOSIS — Z98.1 S/P CERVICAL SPINAL FUSION: Primary | ICD-10-CM

## 2024-12-11 PROCEDURE — 72040 X-RAY EXAM NECK SPINE 2-3 VW: CPT | Mod: TC

## 2024-12-11 PROCEDURE — 99999 PR PBB SHADOW E&M-EST. PATIENT-LVL IV: CPT | Mod: PBBFAC,,, | Performed by: REGISTERED NURSE

## 2024-12-11 PROCEDURE — 99214 OFFICE O/P EST MOD 30 MIN: CPT | Mod: PBBFAC,25 | Performed by: REGISTERED NURSE

## 2024-12-11 PROCEDURE — 99024 POSTOP FOLLOW-UP VISIT: CPT | Mod: POP,,, | Performed by: REGISTERED NURSE

## 2024-12-11 PROCEDURE — 72040 X-RAY EXAM NECK SPINE 2-3 VW: CPT | Mod: 26,,, | Performed by: RADIOLOGY

## 2024-12-11 RX ORDER — TIZANIDINE 4 MG/1
4 TABLET ORAL EVERY 8 HOURS PRN
Qty: 90 TABLET | Refills: 2 | Status: SHIPPED | OUTPATIENT
Start: 2024-12-11

## 2024-12-11 NOTE — PROGRESS NOTES
Established Patient - Audio Only Telehealth Visit     The patient location is: [place]  The chief complaint leading to consultation is: post op  Visit type: Virtual visit with audio only (telephone)  Total time spent with patient: 15min     The reason for the audio only service rather than synchronous audio and video virtual visit was related to technical difficulties or patient preference/necessity.     Each patient to whom I provide medical services by telemedicine is:  (1) informed of the relationship between the physician and patient and the respective role of any other health care provider with respect to management of the patient; and (2) notified that they may decline to receive medical services by telemedicine and may withdraw from such care at any time. Patient verbally consented to receive this service via voice-only telephone call.    Date: 12/11/2024    Supervising Physician: Marlon Cruz M.D.    Date of Surgery: 11/20/24    Procedure: C2-T1 fusion/revision     History: Ana Lilia Matos is seen today for follow-up following the above listed procedure. Overall the patient is doing well but today notes mild neck pain.   Pain is well controlled with current pain medication.  she denies fever, chills, and sweats since the time of the surgery.     Radiographs: hardware in place without failure    Assessment/Plan: 4 weeks post op    Doing well postoperatively.  reviewed. No lifting over 10lbs    I will plan to see the patient back for the next postop visit in 2 months with xray.     Thank you for the opportunity to participate in this patient's care. Please give me a call if there are any concerns or questions.     This service was not originating from a related E/M service provided within the previous 7 days nor will  to an E/M service or procedure within the next 24 hours or my soonest available appointment.  Prevailing standard of care was able to be met in this audio-only visit.

## 2024-12-12 ENCOUNTER — PATIENT MESSAGE (OUTPATIENT)
Dept: ORTHOPEDICS | Facility: CLINIC | Age: 64
End: 2024-12-12
Payer: MEDICARE

## 2024-12-13 ENCOUNTER — PATIENT MESSAGE (OUTPATIENT)
Dept: ORTHOPEDICS | Facility: CLINIC | Age: 64
End: 2024-12-13
Payer: MEDICARE

## 2024-12-19 ENCOUNTER — PATIENT MESSAGE (OUTPATIENT)
Dept: ORTHOPEDICS | Facility: CLINIC | Age: 64
End: 2024-12-19
Payer: MEDICARE

## 2024-12-19 RX ORDER — ONDANSETRON 4 MG/1
4 TABLET, ORALLY DISINTEGRATING ORAL EVERY 6 HOURS PRN
Qty: 30 TABLET | Refills: 0 | Status: SHIPPED | OUTPATIENT
Start: 2024-12-19

## 2024-12-26 ENCOUNTER — OFFICE VISIT (OUTPATIENT)
Dept: ORTHOPEDICS | Facility: CLINIC | Age: 64
End: 2024-12-26
Payer: MEDICARE

## 2024-12-26 DIAGNOSIS — Z98.1 S/P CERVICAL SPINAL FUSION: Primary | ICD-10-CM

## 2024-12-26 PROCEDURE — 99024 POSTOP FOLLOW-UP VISIT: CPT | Mod: 95,POP,, | Performed by: REGISTERED NURSE

## 2025-01-24 NOTE — PROGRESS NOTES
Date: 01/24/2025    Supervising Physician: Marlon Cruz M.D.    Date of Surgery: 11/20/24    Procedure: C2-T1 fusion/revision     History: Ana Lilia Matos is seen today for follow-up following the above listed procedure. Overall the patient is doing well but today notes mild neck pain.   Pain is well controlled with current pain medication.  she denies fever, chills, and sweats since the time of the surgery.     Radiographs: hardware in place without failure    Assessment/Plan: 12 weeks post op    Doing well postoperatively.  reviewed. Released from restrictions. PT orders placed externally.     I will plan to see the patient back for the next postop visit in 1 year    Thank you for the opportunity to participate in this patient's care. Please give me a call if there are any concerns or questions.     This service was not originating from a related E/M service provided within the previous 7 days nor will  to an E/M service or procedure within the next 24 hours or my soonest available appointment.  Prevailing standard of care was able to be met in this audio-only visit.

## 2025-02-14 ENCOUNTER — HOSPITAL ENCOUNTER (OUTPATIENT)
Dept: RADIOLOGY | Facility: HOSPITAL | Age: 65
Discharge: HOME | End: 2025-02-14
Attending: REGISTERED NURSE
Payer: MEDICARE

## 2025-02-14 ENCOUNTER — OFFICE VISIT (OUTPATIENT)
Dept: ORTHOPEDICS | Facility: CLINIC | Age: 65
End: 2025-02-14
Payer: MEDICARE

## 2025-02-14 VITALS — WEIGHT: 143 LBS | BODY MASS INDEX: 22.98 KG/M2 | HEIGHT: 66 IN

## 2025-02-14 DIAGNOSIS — Z98.1 S/P CERVICAL SPINAL FUSION: ICD-10-CM

## 2025-02-14 PROCEDURE — 99999 PR PBB SHADOW E&M-EST. PATIENT-LVL IV: CPT | Mod: PBBFAC,,, | Performed by: REGISTERED NURSE

## 2025-02-14 PROCEDURE — 72040 X-RAY EXAM NECK SPINE 2-3 VW: CPT | Mod: TC

## 2025-02-14 PROCEDURE — 72040 X-RAY EXAM NECK SPINE 2-3 VW: CPT | Mod: 26,,, | Performed by: RADIOLOGY

## 2025-02-14 PROCEDURE — 99214 OFFICE O/P EST MOD 30 MIN: CPT | Mod: PBBFAC,25 | Performed by: REGISTERED NURSE

## 2025-02-14 RX ORDER — TIZANIDINE 4 MG/1
4 TABLET ORAL EVERY 8 HOURS PRN
Qty: 90 TABLET | Refills: 2 | Status: SHIPPED | OUTPATIENT
Start: 2025-02-14

## 2025-02-17 ENCOUNTER — PATIENT MESSAGE (OUTPATIENT)
Dept: ORTHOPEDICS | Facility: CLINIC | Age: 65
End: 2025-02-17
Payer: MEDICARE

## 2025-03-05 ENCOUNTER — PATIENT MESSAGE (OUTPATIENT)
Dept: ORTHOPEDICS | Facility: CLINIC | Age: 65
End: 2025-03-05
Payer: MEDICARE

## (undated) DEVICE — DRESSING AQUACEL FOAM 3 X 3

## (undated) DEVICE — KIT SURGIFLO HEMOSTATIC MATRIX

## (undated) DEVICE — DRAPE THREE-QTR REINF 53X77IN

## (undated) DEVICE — SPONGE GAUZE 16PLY 4X4

## (undated) DEVICE — DRESSING MEPILEX FLEX 3X3IN

## (undated) DEVICE — DRESSING AQUACEL FOAM 5 X 5

## (undated) DEVICE — TIP YANKAUERS BULB NO VENT

## (undated) DEVICE — KIT SPINAL PATIENT CARE JACK

## (undated) DEVICE — BURR RND FLUT SFT TOUCH 2.0MM

## (undated) DEVICE — SEE MEDLINE ITEM 156905

## (undated) DEVICE — GOWN POLY REINF BRTH SLV XL

## (undated) DEVICE — 3.5 TAP

## (undated) DEVICE — DRESSING MEPILEX BORDER 4 X 4

## (undated) DEVICE — BUR BONE CUT MICRO TPS 3X3.8MM

## (undated) DEVICE — NDL HYPO STD REG BVL 18GX1.5IN

## (undated) DEVICE — DRESSING AQUACEL SACRAL 9 X 9

## (undated) DEVICE — MARKER SKIN STND TIP BLUE BARR

## (undated) DEVICE — ELECTRODE BLD 1 INCH TEFLON

## (undated) DEVICE — NDL HYPO STD REG BVL 22GX1.5IN

## (undated) DEVICE — DRESSING TRANS 4X4 TEGADERM

## (undated) DEVICE — SPONGE LAP 4X18 PREWASHED

## (undated) DEVICE — DRAPE LAP T SHT W/ INSTR PAD

## (undated) DEVICE — SUT 2/0 30IN SILK BLK BRAI

## (undated) DEVICE — ADHESIVE MASTISOL VIAL 48/BX

## (undated) DEVICE — SYS CLSR DERMABOND PRINEO 22CM

## (undated) DEVICE — ELECTRODE REM PLYHSV RETURN 9

## (undated) DEVICE — STRIP MEDI WND CLSR 1X5IN

## (undated) DEVICE — CLIP ON REMOTE CONTROL

## (undated) DEVICE — DRAPE C-ARMOR EQUIPMENT COVER

## (undated) DEVICE — TAPE SILK 3IN

## (undated) DEVICE — SUT ETHIBOND 0 CR/CT-1 8-18

## (undated) DEVICE — DRESSING AQUACEL FOAM 4 X 12

## (undated) DEVICE — APPLICATOR CHLORAPREP ORN 26ML

## (undated) DEVICE — DRAPE STERI-DRAPE 1000 17X11IN

## (undated) DEVICE — CLOSURE SKIN 1X5 STERI-STRIP

## (undated) DEVICE — DRESSING ABSRBNT ISLAND 3.6X8

## (undated) DEVICE — SUT VICRYL PLUS 2-0 CT1 18

## (undated) DEVICE — 4.5 TAP

## (undated) DEVICE — NDL 22GA X1 1/2 REG BEVEL

## (undated) DEVICE — TRAY CATH 1-LYR URIMTR 16FR

## (undated) DEVICE — DRAPE ABDOMINAL TIBURON 14X11

## (undated) DEVICE — BLADE ELECTRO EDGE INSULATED

## (undated) DEVICE — NDL SPINAL 18GX3.5 SPINOCAN

## (undated) DEVICE — TAP SHORT SURGICAL NS 3.5MM

## (undated) DEVICE — TRAY NEURO OMC

## (undated) DEVICE — SYR 30CC LUER LOCK

## (undated) DEVICE — KIT EVACUATOR 3-SPRING 1/8 DRN

## (undated) DEVICE — NDL 18GA X1 1/2 REG BEVEL

## (undated) DEVICE — PINS SKULL ADULT MAYFIELD
Type: IMPLANTABLE DEVICE | Site: CRANIAL | Status: NON-FUNCTIONAL
Removed: 2024-11-20

## (undated) DEVICE — MARKER SKIN RULER STERILE

## (undated) DEVICE — TRAY CATH FOL SIL URIMTR 16FR

## (undated) DEVICE — Device

## (undated) DEVICE — DRAPE TOP 53X102IN

## (undated) DEVICE — PENCIL ROCKER SWITCH 10FT CORD